# Patient Record
Sex: FEMALE | Race: WHITE | NOT HISPANIC OR LATINO | Employment: UNEMPLOYED | URBAN - METROPOLITAN AREA
[De-identification: names, ages, dates, MRNs, and addresses within clinical notes are randomized per-mention and may not be internally consistent; named-entity substitution may affect disease eponyms.]

---

## 2017-09-14 ENCOUNTER — HOSPITAL ENCOUNTER (OUTPATIENT)
Dept: LAB | Facility: MEDICAL CENTER | Age: 57
End: 2017-09-14
Attending: OBSTETRICS & GYNECOLOGY
Payer: COMMERCIAL

## 2017-09-14 LAB
25(OH)D3 SERPL-MCNC: 34 NG/ML (ref 30–100)
ALBUMIN SERPL BCP-MCNC: 3.8 G/DL (ref 3.2–4.9)
ALBUMIN/GLOB SERPL: 1.3 G/DL
ALP SERPL-CCNC: 48 U/L (ref 30–99)
ALT SERPL-CCNC: 16 U/L (ref 2–50)
ANION GAP SERPL CALC-SCNC: 8 MMOL/L (ref 0–11.9)
AST SERPL-CCNC: 19 U/L (ref 12–45)
BASOPHILS # BLD AUTO: 0.3 % (ref 0–1.8)
BASOPHILS # BLD: 0.03 K/UL (ref 0–0.12)
BILIRUB SERPL-MCNC: 0.5 MG/DL (ref 0.1–1.5)
BUN SERPL-MCNC: 11 MG/DL (ref 8–22)
CALCIUM SERPL-MCNC: 8.9 MG/DL (ref 8.5–10.5)
CHLORIDE SERPL-SCNC: 107 MMOL/L (ref 96–112)
CHOLEST SERPL-MCNC: 166 MG/DL (ref 100–199)
CO2 SERPL-SCNC: 25 MMOL/L (ref 20–33)
CREAT SERPL-MCNC: 0.63 MG/DL (ref 0.5–1.4)
CRP SERPL HS-MCNC: 17.7 MG/L (ref 0–7.5)
EOSINOPHIL # BLD AUTO: 0.12 K/UL (ref 0–0.51)
EOSINOPHIL NFR BLD: 1.4 % (ref 0–6.9)
ERYTHROCYTE [DISTWIDTH] IN BLOOD BY AUTOMATED COUNT: 45 FL (ref 35.9–50)
EST. AVERAGE GLUCOSE BLD GHB EST-MCNC: 117 MG/DL
FERRITIN SERPL-MCNC: 54.5 NG/ML (ref 10–291)
GFR SERPL CREATININE-BSD FRML MDRD: >60 ML/MIN/1.73 M 2
GLOBULIN SER CALC-MCNC: 2.9 G/DL (ref 1.9–3.5)
GLUCOSE SERPL-MCNC: 100 MG/DL (ref 65–99)
HBA1C MFR BLD: 5.7 % (ref 0–5.6)
HCT VFR BLD AUTO: 44.2 % (ref 37–47)
HCYS SERPL-SCNC: 10.35 UMOL/L
HDLC SERPL-MCNC: 41 MG/DL
HGB BLD-MCNC: 14.2 G/DL (ref 12–16)
IMM GRANULOCYTES # BLD AUTO: 0.02 K/UL (ref 0–0.11)
IMM GRANULOCYTES NFR BLD AUTO: 0.2 % (ref 0–0.9)
IRON SATN MFR SERPL: 18 % (ref 15–55)
IRON SERPL-MCNC: 62 UG/DL (ref 40–170)
LDLC SERPL CALC-MCNC: 79 MG/DL
LYMPHOCYTES # BLD AUTO: 3.53 K/UL (ref 1–4.8)
LYMPHOCYTES NFR BLD: 41.1 % (ref 22–41)
MCH RBC QN AUTO: 29.6 PG (ref 27–33)
MCHC RBC AUTO-ENTMCNC: 32.1 G/DL (ref 33.6–35)
MCV RBC AUTO: 92.3 FL (ref 81.4–97.8)
MONOCYTES # BLD AUTO: 0.68 K/UL (ref 0–0.85)
MONOCYTES NFR BLD AUTO: 7.9 % (ref 0–13.4)
NEUTROPHILS # BLD AUTO: 4.21 K/UL (ref 2–7.15)
NEUTROPHILS NFR BLD: 49.1 % (ref 44–72)
NRBC # BLD AUTO: 0 K/UL
NRBC BLD AUTO-RTO: 0 /100 WBC
PLATELET # BLD AUTO: 234 K/UL (ref 164–446)
PMV BLD AUTO: 10.7 FL (ref 9–12.9)
POTASSIUM SERPL-SCNC: 4.1 MMOL/L (ref 3.6–5.5)
PROT SERPL-MCNC: 6.7 G/DL (ref 6–8.2)
RBC # BLD AUTO: 4.79 M/UL (ref 4.2–5.4)
SODIUM SERPL-SCNC: 140 MMOL/L (ref 135–145)
T3FREE SERPL-MCNC: 3.3 PG/ML (ref 2.4–4.2)
T4 FREE SERPL-MCNC: 0.57 NG/DL (ref 0.53–1.43)
TIBC SERPL-MCNC: 339 UG/DL (ref 250–450)
TRIGL SERPL-MCNC: 230 MG/DL (ref 0–149)
TSH SERPL DL<=0.005 MIU/L-ACNC: 1.13 UIU/ML (ref 0.3–3.7)
URATE SERPL-MCNC: 5.1 MG/DL (ref 1.9–8.2)
VIT B12 SERPL-MCNC: 1389 PG/ML (ref 211–911)
WBC # BLD AUTO: 8.6 K/UL (ref 4.8–10.8)

## 2017-09-14 PROCEDURE — 80061 LIPID PANEL: CPT

## 2017-09-14 PROCEDURE — 82728 ASSAY OF FERRITIN: CPT

## 2017-09-14 PROCEDURE — 36415 COLL VENOUS BLD VENIPUNCTURE: CPT

## 2017-09-14 PROCEDURE — 86141 C-REACTIVE PROTEIN HS: CPT

## 2017-09-14 PROCEDURE — 86352 CELL FUNCTION ASSAY W/STIM: CPT

## 2017-09-14 PROCEDURE — 82306 VITAMIN D 25 HYDROXY: CPT

## 2017-09-14 PROCEDURE — 85025 COMPLETE CBC W/AUTO DIFF WBC: CPT

## 2017-09-14 PROCEDURE — 83036 HEMOGLOBIN GLYCOSYLATED A1C: CPT

## 2017-09-14 PROCEDURE — 84443 ASSAY THYROID STIM HORMONE: CPT

## 2017-09-14 PROCEDURE — 84439 ASSAY OF FREE THYROXINE: CPT

## 2017-09-14 PROCEDURE — 83090 ASSAY OF HOMOCYSTEINE: CPT

## 2017-09-14 PROCEDURE — 82607 VITAMIN B-12: CPT

## 2017-09-14 PROCEDURE — 80053 COMPREHEN METABOLIC PANEL: CPT

## 2017-09-14 PROCEDURE — 83520 IMMUNOASSAY QUANT NOS NONAB: CPT | Mod: 91

## 2017-09-14 PROCEDURE — 84481 FREE ASSAY (FT-3): CPT

## 2017-09-14 PROCEDURE — 84550 ASSAY OF BLOOD/URIC ACID: CPT

## 2017-09-14 PROCEDURE — 83540 ASSAY OF IRON: CPT

## 2017-09-14 PROCEDURE — 83550 IRON BINDING TEST: CPT

## 2017-09-17 LAB
MISCELLANEOUS LAB RESULT MISCLAB: NORMAL
MISCELLANEOUS LAB RESULT MISCLAB: NORMAL

## 2017-09-18 LAB
INFLIXIMAB AB SERPL-MCNC: NOT DETECTED NG/ML
Lab: NORMAL

## 2017-09-25 LAB
EER INFLIX ACT RLX AB Q0149: NORMAL
INFLIXIMAB ACT RLX AB Q0148: NOT DETECTED

## 2017-10-04 ENCOUNTER — OFFICE VISIT (OUTPATIENT)
Dept: CARDIOLOGY | Facility: MEDICAL CENTER | Age: 57
End: 2017-10-04
Payer: COMMERCIAL

## 2017-10-04 VITALS
BODY MASS INDEX: 36.02 KG/M2 | DIASTOLIC BLOOD PRESSURE: 84 MMHG | SYSTOLIC BLOOD PRESSURE: 132 MMHG | WEIGHT: 211 LBS | HEART RATE: 75 BPM | HEIGHT: 64 IN | OXYGEN SATURATION: 95 %

## 2017-10-04 DIAGNOSIS — R07.89 CHEST PAIN, ATYPICAL: ICD-10-CM

## 2017-10-04 DIAGNOSIS — I10 ESSENTIAL HYPERTENSION: ICD-10-CM

## 2017-10-04 PROCEDURE — 99213 OFFICE O/P EST LOW 20 MIN: CPT | Performed by: INTERNAL MEDICINE

## 2017-10-04 RX ORDER — METOPROLOL SUCCINATE 50 MG/1
50 TABLET, EXTENDED RELEASE ORAL EVERY MORNING
COMMUNITY
Start: 2017-08-06

## 2017-10-05 ASSESSMENT — ENCOUNTER SYMPTOMS
DIZZINESS: 0
LOSS OF CONSCIOUSNESS: 0
PALPITATIONS: 0

## 2017-10-06 NOTE — PROGRESS NOTES
Subjective:   Senia Valle is a 57 y.o. female who presents today for follow-up evaluation of the history of chest pain and hypertension.    Previously followed by my partner Dr. Leandro Sharma and was last seen on 7/20/2016.    Overall the patient has been doing well from a cardiac standpoint.  Blood pressures been stable.  Still occasionally has some chest pain which undoubtedly has been related to her cervical disc disease for which she's had 2 previous surgeries.  No other cardiac symptoms.    Past Medical History:   Diagnosis Date   • Chest pain, atypical 8/26/2013   • SOB (shortness of breath) 8/26/2013   • Fibromyalgia muscle pain 8/26/2013   • Hypertensive cardiovascular disease 12/30/2011   • Back pain    • Bruxism    • Degeneration of cervical intervertebral disc    • Dizziness    • Hypertension    • Hypothyroidism    • Lumbosacral (joint) (ligament) sprain    • Lumbosacral (joint) (ligament) sprain    • Obesity      Past Surgical History:   Procedure Laterality Date   • CHOLECYSTECTOMY     • OTHER SURGICAL PROCEDURE  cervical vertebral fusion   • OTHER SURGICAL PROCEDURE  hip repair   • OTHER SURGICAL PROCEDURE  lamenectomy decompress   • TONSILLECTOMY       History reviewed. No pertinent family history.  History   Smoking Status   • Former Smoker   • Packs/day: 1.00   • Years: 10.00   • Types: Cigarettes   Smokeless Tobacco   • Never Used     Allergies   Allergen Reactions   • Demerol    • Diazepam    • Flexeril [Cyclobenzaprine Hcl]    • Nsaids    • Percocet [Oxycodone-Acetaminophen] Itching     Outpatient Encounter Prescriptions as of 10/4/2017   Medication Sig Dispense Refill   • metoprolol SR (TOPROL XL) 50 MG TABLET SR 24 HR      • progesterone (PROMETRIUM) 200 MG capsule Take 200 mg by mouth every day.     • Cholecalciferol (VITAMIN D) 400 UNIT/ML LIQD Take  by mouth.     • thyroid (ARMOUR THYROID) 60 MG TABS Take 60 mg by mouth every day.     • MELATONIN by Does not apply route. 4 liquid  "droppers @@ [facility]      • ESTROGENS CONJUGATED Apply  to skin as directed.     • metoprolol (LOPRESSOR) 25 MG Tab Take 1 Tab by mouth 2 times a day. 180 Tab 3   • alprazolam (XANAX) 0.5 MG Tab Take 0.5 mg by mouth at bedtime as needed for Sleep.     • hydrocodone-acetaminophen (NORCO) 5-325 MG Tab per tablet Take 1-2 Tabs by mouth every four hours as needed. As needed     • nitroglycerin (NITROSTAT) 0.4 MG SUBL Place 1 Tab under tongue as needed for Chest Pain. 25 Tab 6     No facility-administered encounter medications on file as of 10/4/2017.      Review of Systems   Cardiovascular: Negative for palpitations.   Neurological: Negative for dizziness and loss of consciousness.        Objective:   /84   Pulse 75   Ht 1.626 m (5' 4\")   Wt 95.7 kg (211 lb)   SpO2 95%   BMI 36.22 kg/m²     Physical Exam   Constitutional: She is oriented to person, place, and time. She appears well-nourished. No distress.   HENT:   Head: Normocephalic.   Eyes: EOM are normal. No scleral icterus.   Neck: Carotid bruit is not present.   Normal jugular venous pressure.  Healed neck scar.   Cardiovascular: Normal rate, regular rhythm, S1 normal, S2 normal and normal heart sounds.  Exam reveals no gallop and no friction rub.    No murmur heard.  Pulses:       Carotid pulses are 2+ on the right side, and 2+ on the left side.       Radial pulses are 2+ on the right side, and 2+ on the left side.   Pulmonary/Chest: Effort normal and breath sounds normal. She has no wheezes. She has no rhonchi. She has no rales.   Musculoskeletal: She exhibits no edema.   Neurological: She is alert and oriented to person, place, and time.   Skin: Skin is warm and dry.   Psychiatric: She has a normal mood and affect. Her behavior is normal.       Assessment:     1. Chest pain, atypical     2. Essential hypertension         Medical Decision Making:  Today's Assessment / Status / Plan:     Chest pain. Nonischemic.    Hypertension. Controlled.    Follow-up one " year, sooner if necessary.

## 2017-10-13 ENCOUNTER — HOSPITAL ENCOUNTER (OUTPATIENT)
Dept: LAB | Facility: MEDICAL CENTER | Age: 57
End: 2017-10-13
Attending: OBSTETRICS & GYNECOLOGY
Payer: COMMERCIAL

## 2017-10-13 LAB — CANCER AG125 SERPL-ACNC: 10.3 U/ML (ref 0–35)

## 2017-10-13 PROCEDURE — 86304 IMMUNOASSAY TUMOR CA 125: CPT

## 2017-10-13 PROCEDURE — 36415 COLL VENOUS BLD VENIPUNCTURE: CPT

## 2018-02-07 ENCOUNTER — APPOINTMENT (OUTPATIENT)
Dept: RADIOLOGY | Facility: MEDICAL CENTER | Age: 58
End: 2018-02-07
Attending: OBSTETRICS & GYNECOLOGY
Payer: COMMERCIAL

## 2018-02-07 DIAGNOSIS — M79.672 LEFT FOOT PAIN: ICD-10-CM

## 2018-02-07 DIAGNOSIS — R51.9 NONINTRACTABLE HEADACHE, UNSPECIFIED CHRONICITY PATTERN, UNSPECIFIED HEADACHE TYPE: ICD-10-CM

## 2018-02-07 DIAGNOSIS — M54.2 NECK PAIN: ICD-10-CM

## 2018-02-07 DIAGNOSIS — R20.0: ICD-10-CM

## 2018-02-07 DIAGNOSIS — M79.671 RIGHT FOOT PAIN: ICD-10-CM

## 2018-02-07 PROCEDURE — A9579 GAD-BASE MR CONTRAST NOS,1ML: HCPCS | Performed by: OBSTETRICS & GYNECOLOGY

## 2018-02-07 PROCEDURE — 72158 MRI LUMBAR SPINE W/O & W/DYE: CPT

## 2018-02-07 PROCEDURE — 72156 MRI NECK SPINE W/O & W/DYE: CPT

## 2018-02-07 PROCEDURE — 700117 HCHG RX CONTRAST REV CODE 255: Performed by: OBSTETRICS & GYNECOLOGY

## 2018-02-07 RX ADMIN — GADODIAMIDE 20 ML: 287 INJECTION INTRAVENOUS at 14:40

## 2018-02-26 ENCOUNTER — APPOINTMENT (OUTPATIENT)
Dept: RADIOLOGY | Facility: MEDICAL CENTER | Age: 58
End: 2018-02-26
Attending: NURSE PRACTITIONER
Payer: COMMERCIAL

## 2018-02-26 DIAGNOSIS — M25.552 LEFT HIP PAIN: ICD-10-CM

## 2018-02-26 PROCEDURE — 73721 MRI JNT OF LWR EXTRE W/O DYE: CPT | Mod: LT

## 2018-03-05 ENCOUNTER — HOSPITAL ENCOUNTER (OUTPATIENT)
Dept: RADIOLOGY | Facility: MEDICAL CENTER | Age: 58
End: 2018-03-05
Attending: OBSTETRICS & GYNECOLOGY
Payer: COMMERCIAL

## 2018-03-05 DIAGNOSIS — N85.2 ENLARGED UTERUS: ICD-10-CM

## 2018-03-05 DIAGNOSIS — R19.00 PELVIC MASS IN FEMALE: ICD-10-CM

## 2018-03-05 PROCEDURE — 76830 TRANSVAGINAL US NON-OB: CPT

## 2018-03-13 ENCOUNTER — HOSPITAL ENCOUNTER (OUTPATIENT)
Dept: RADIOLOGY | Facility: MEDICAL CENTER | Age: 58
End: 2018-03-13
Attending: OBSTETRICS & GYNECOLOGY
Payer: COMMERCIAL

## 2018-03-13 DIAGNOSIS — M79.89 SWELLING OF LIMB: ICD-10-CM

## 2018-03-13 DIAGNOSIS — Z12.31 SCREENING MAMMOGRAM, ENCOUNTER FOR: ICD-10-CM

## 2018-03-13 PROCEDURE — 77067 SCR MAMMO BI INCL CAD: CPT

## 2018-03-13 PROCEDURE — 93971 EXTREMITY STUDY: CPT | Mod: LT

## 2018-04-04 ENCOUNTER — HOSPITAL ENCOUNTER (OUTPATIENT)
Dept: LAB | Facility: MEDICAL CENTER | Age: 58
End: 2018-04-04
Attending: OBSTETRICS & GYNECOLOGY
Payer: COMMERCIAL

## 2018-04-04 LAB
25(OH)D3 SERPL-MCNC: 50 NG/ML (ref 30–100)
ALBUMIN SERPL BCP-MCNC: 3.9 G/DL (ref 3.2–4.9)
ALBUMIN/GLOB SERPL: 1.3 G/DL
ALP SERPL-CCNC: 45 U/L (ref 30–99)
ALT SERPL-CCNC: 18 U/L (ref 2–50)
ANION GAP SERPL CALC-SCNC: 11 MMOL/L (ref 0–11.9)
AST SERPL-CCNC: 19 U/L (ref 12–45)
BILIRUB SERPL-MCNC: 0.6 MG/DL (ref 0.1–1.5)
BUN SERPL-MCNC: 12 MG/DL (ref 8–22)
CALCIUM SERPL-MCNC: 9.1 MG/DL (ref 8.5–10.5)
CHLORIDE SERPL-SCNC: 108 MMOL/L (ref 96–112)
CHOLEST SERPL-MCNC: 204 MG/DL (ref 100–199)
CO2 SERPL-SCNC: 25 MMOL/L (ref 20–33)
CREAT SERPL-MCNC: 0.72 MG/DL (ref 0.5–1.4)
CRP SERPL HS-MCNC: 9.5 MG/L (ref 0–7.5)
GLOBULIN SER CALC-MCNC: 2.9 G/DL (ref 1.9–3.5)
GLUCOSE SERPL-MCNC: 105 MG/DL (ref 65–99)
HDLC SERPL-MCNC: 45 MG/DL
LDLC SERPL CALC-MCNC: 123 MG/DL
POTASSIUM SERPL-SCNC: 4.1 MMOL/L (ref 3.6–5.5)
PROT SERPL-MCNC: 6.8 G/DL (ref 6–8.2)
SODIUM SERPL-SCNC: 144 MMOL/L (ref 135–145)
T3FREE SERPL-MCNC: 2.85 PG/ML (ref 2.4–4.2)
T4 FREE SERPL-MCNC: 0.71 NG/DL (ref 0.53–1.43)
TRIGL SERPL-MCNC: 180 MG/DL (ref 0–149)
TSH SERPL DL<=0.005 MIU/L-ACNC: 0.39 UIU/ML (ref 0.38–5.33)

## 2018-04-04 PROCEDURE — 84481 FREE ASSAY (FT-3): CPT

## 2018-04-04 PROCEDURE — 80061 LIPID PANEL: CPT

## 2018-04-04 PROCEDURE — 82306 VITAMIN D 25 HYDROXY: CPT

## 2018-04-04 PROCEDURE — 84439 ASSAY OF FREE THYROXINE: CPT

## 2018-04-04 PROCEDURE — 83036 HEMOGLOBIN GLYCOSYLATED A1C: CPT

## 2018-04-04 PROCEDURE — 86141 C-REACTIVE PROTEIN HS: CPT

## 2018-04-04 PROCEDURE — 36415 COLL VENOUS BLD VENIPUNCTURE: CPT

## 2018-04-04 PROCEDURE — 80053 COMPREHEN METABOLIC PANEL: CPT

## 2018-04-04 PROCEDURE — 84443 ASSAY THYROID STIM HORMONE: CPT

## 2018-04-05 LAB
EST. AVERAGE GLUCOSE BLD GHB EST-MCNC: 117 MG/DL
HBA1C MFR BLD: 5.7 % (ref 0–5.6)

## 2018-04-06 ENCOUNTER — HOSPITAL ENCOUNTER (OUTPATIENT)
Dept: LAB | Facility: MEDICAL CENTER | Age: 58
End: 2018-04-06
Attending: OBSTETRICS & GYNECOLOGY
Payer: COMMERCIAL

## 2018-04-06 LAB
BASOPHILS # BLD AUTO: 0.5 % (ref 0–1.8)
BASOPHILS # BLD: 0.04 K/UL (ref 0–0.12)
EOSINOPHIL # BLD AUTO: 0.12 K/UL (ref 0–0.51)
EOSINOPHIL NFR BLD: 1.4 % (ref 0–6.9)
ERYTHROCYTE [DISTWIDTH] IN BLOOD BY AUTOMATED COUNT: 45.5 FL (ref 35.9–50)
HCT VFR BLD AUTO: 44.3 % (ref 37–47)
HGB BLD-MCNC: 13.9 G/DL (ref 12–16)
IMM GRANULOCYTES # BLD AUTO: 0.01 K/UL (ref 0–0.11)
IMM GRANULOCYTES NFR BLD AUTO: 0.1 % (ref 0–0.9)
LYMPHOCYTES # BLD AUTO: 3.91 K/UL (ref 1–4.8)
LYMPHOCYTES NFR BLD: 47.1 % (ref 22–41)
MCH RBC QN AUTO: 29.5 PG (ref 27–33)
MCHC RBC AUTO-ENTMCNC: 31.4 G/DL (ref 33.6–35)
MCV RBC AUTO: 94.1 FL (ref 81.4–97.8)
MONOCYTES # BLD AUTO: 0.61 K/UL (ref 0–0.85)
MONOCYTES NFR BLD AUTO: 7.3 % (ref 0–13.4)
NEUTROPHILS # BLD AUTO: 3.62 K/UL (ref 2–7.15)
NEUTROPHILS NFR BLD: 43.6 % (ref 44–72)
NRBC # BLD AUTO: 0 K/UL
NRBC BLD-RTO: 0 /100 WBC
PLATELET # BLD AUTO: 259 K/UL (ref 164–446)
PMV BLD AUTO: 10.9 FL (ref 9–12.9)
RBC # BLD AUTO: 4.71 M/UL (ref 4.2–5.4)
WBC # BLD AUTO: 8.3 K/UL (ref 4.8–10.8)

## 2018-04-06 PROCEDURE — 36415 COLL VENOUS BLD VENIPUNCTURE: CPT

## 2018-04-06 PROCEDURE — 85025 COMPLETE CBC W/AUTO DIFF WBC: CPT

## 2018-05-14 DIAGNOSIS — Z01.810 PRE-OPERATIVE CARDIOVASCULAR EXAMINATION: ICD-10-CM

## 2018-05-14 DIAGNOSIS — Z01.812 PRE-OPERATIVE LABORATORY EXAMINATION: ICD-10-CM

## 2018-05-14 LAB
ANION GAP SERPL CALC-SCNC: 10 MMOL/L (ref 0–11.9)
BASOPHILS # BLD AUTO: 0.3 % (ref 0–1.8)
BASOPHILS # BLD: 0.03 K/UL (ref 0–0.12)
BUN SERPL-MCNC: 10 MG/DL (ref 8–22)
CALCIUM SERPL-MCNC: 9.2 MG/DL (ref 8.5–10.5)
CHLORIDE SERPL-SCNC: 104 MMOL/L (ref 96–112)
CO2 SERPL-SCNC: 25 MMOL/L (ref 20–33)
CREAT SERPL-MCNC: 0.6 MG/DL (ref 0.5–1.4)
EKG IMPRESSION: NORMAL
EOSINOPHIL # BLD AUTO: 0.08 K/UL (ref 0–0.51)
EOSINOPHIL NFR BLD: 0.9 % (ref 0–6.9)
ERYTHROCYTE [DISTWIDTH] IN BLOOD BY AUTOMATED COUNT: 45.4 FL (ref 35.9–50)
GLUCOSE SERPL-MCNC: 92 MG/DL (ref 65–99)
HCT VFR BLD AUTO: 41.3 % (ref 37–47)
HGB BLD-MCNC: 13.6 G/DL (ref 12–16)
HIV 1+2 AB+HIV1 P24 AG SERPL QL IA: NON REACTIVE
IMM GRANULOCYTES # BLD AUTO: 0.03 K/UL (ref 0–0.11)
IMM GRANULOCYTES NFR BLD AUTO: 0.3 % (ref 0–0.9)
LYMPHOCYTES # BLD AUTO: 4.26 K/UL (ref 1–4.8)
LYMPHOCYTES NFR BLD: 45.8 % (ref 22–41)
MCH RBC QN AUTO: 30 PG (ref 27–33)
MCHC RBC AUTO-ENTMCNC: 32.9 G/DL (ref 33.6–35)
MCV RBC AUTO: 91.2 FL (ref 81.4–97.8)
MONOCYTES # BLD AUTO: 0.7 K/UL (ref 0–0.85)
MONOCYTES NFR BLD AUTO: 7.5 % (ref 0–13.4)
NEUTROPHILS # BLD AUTO: 4.2 K/UL (ref 2–7.15)
NEUTROPHILS NFR BLD: 45.2 % (ref 44–72)
NRBC # BLD AUTO: 0 K/UL
NRBC BLD-RTO: 0 /100 WBC
PLATELET # BLD AUTO: 280 K/UL (ref 164–446)
PMV BLD AUTO: 10.3 FL (ref 9–12.9)
POTASSIUM SERPL-SCNC: 4.1 MMOL/L (ref 3.6–5.5)
RBC # BLD AUTO: 4.53 M/UL (ref 4.2–5.4)
SCCMEC + MECA PNL NOSE NAA+PROBE: NEGATIVE
SCCMEC + MECA PNL NOSE NAA+PROBE: NEGATIVE
SODIUM SERPL-SCNC: 139 MMOL/L (ref 135–145)
WBC # BLD AUTO: 9.3 K/UL (ref 4.8–10.8)

## 2018-05-14 PROCEDURE — 87389 HIV-1 AG W/HIV-1&-2 AB AG IA: CPT

## 2018-05-14 PROCEDURE — 36415 COLL VENOUS BLD VENIPUNCTURE: CPT

## 2018-05-14 PROCEDURE — 93005 ELECTROCARDIOGRAM TRACING: CPT

## 2018-05-14 PROCEDURE — 80048 BASIC METABOLIC PNL TOTAL CA: CPT

## 2018-05-14 PROCEDURE — 85025 COMPLETE CBC W/AUTO DIFF WBC: CPT

## 2018-05-14 PROCEDURE — 87641 MR-STAPH DNA AMP PROBE: CPT

## 2018-05-14 PROCEDURE — 93010 ELECTROCARDIOGRAM REPORT: CPT | Performed by: INTERNAL MEDICINE

## 2018-05-14 PROCEDURE — 87640 STAPH A DNA AMP PROBE: CPT

## 2018-05-14 NOTE — DISCHARGE PLANNING
DISCHARGE PLANNING NOTE - TOTAL JOINT     Procedure: Procedure(s):  HIP ARTHROPLASTY ANTERIOR TOTAL  Procedure Date: 5/25/2018  Insurance:  Payor: [facility] / Plan: [facility] [employer] PPO   Equipment currently available at home? front-wheel walker and grabber, long handled brush  Steps into the home? 1  Steps within the home? 2 story, with railing  Toilet height? Standard  Type of shower? walk-in shower with bench  Who will be with you during your recovery? son  Is Outpatient Physical Therapy set up after surgery? No   Did you take the Total Joint Class and where? No, plans on attending at the [facility] prior to surgery     Plan: Patient states she has a FWW that she obtained  then 5 years ago but it is in storage. She will try to retrieve the FWW and will let the nurse know if she needs a new one. Reviewed Equipment Resource list and provided a copy to the patient. She does not feel she will need a raised toilet seat. Provided Home Safety Checklist. Patient hopes to go home the day of surgery.

## 2018-05-24 RX ORDER — CEFAZOLIN SODIUM 2 G/100ML
2 INJECTION, SOLUTION INTRAVENOUS ONCE
Status: DISCONTINUED | OUTPATIENT
Start: 2018-05-25 | End: 2018-05-25 | Stop reason: HOSPADM

## 2018-05-25 ENCOUNTER — APPOINTMENT (OUTPATIENT)
Dept: RADIOLOGY | Facility: MEDICAL CENTER | Age: 58
DRG: 470 | End: 2018-05-25
Attending: ORTHOPAEDIC SURGERY
Payer: COMMERCIAL

## 2018-05-25 ENCOUNTER — HOSPITAL ENCOUNTER (INPATIENT)
Facility: MEDICAL CENTER | Age: 58
LOS: 1 days | DRG: 470 | End: 2018-05-25
Attending: ORTHOPAEDIC SURGERY | Admitting: ORTHOPAEDIC SURGERY
Payer: COMMERCIAL

## 2018-05-25 VITALS
HEART RATE: 77 BPM | RESPIRATION RATE: 16 BRPM | WEIGHT: 207 LBS | BODY MASS INDEX: 35.34 KG/M2 | TEMPERATURE: 97.6 F | HEIGHT: 64 IN | SYSTOLIC BLOOD PRESSURE: 110 MMHG | DIASTOLIC BLOOD PRESSURE: 63 MMHG | OXYGEN SATURATION: 93 %

## 2018-05-25 DIAGNOSIS — Z96.642 STATUS POST LEFT HIP REPLACEMENT: ICD-10-CM

## 2018-05-25 PROBLEM — Z96.649 S/P HIP REPLACEMENT: Status: ACTIVE | Noted: 2018-05-25

## 2018-05-25 PROCEDURE — 160031 HCHG SURGERY MINUTES - 1ST 30 MINS LEVEL 5: Performed by: ORTHOPAEDIC SURGERY

## 2018-05-25 PROCEDURE — G8987 SELF CARE CURRENT STATUS: HCPCS | Mod: CI

## 2018-05-25 PROCEDURE — 502000 HCHG MISC OR IMPLANTS RC 0278: Performed by: ORTHOPAEDIC SURGERY

## 2018-05-25 PROCEDURE — 700105 HCHG RX REV CODE 258: Performed by: ORTHOPAEDIC SURGERY

## 2018-05-25 PROCEDURE — 700101 HCHG RX REV CODE 250: Performed by: ORTHOPAEDIC SURGERY

## 2018-05-25 PROCEDURE — 160009 HCHG ANES TIME/MIN: Performed by: ORTHOPAEDIC SURGERY

## 2018-05-25 PROCEDURE — G8979 MOBILITY GOAL STATUS: HCPCS | Mod: CI

## 2018-05-25 PROCEDURE — 0SRB06Z REPLACEMENT OF LEFT HIP JOINT WITH OXIDIZED ZIRCONIUM ON POLYETHYLENE SYNTHETIC SUBSTITUTE, OPEN APPROACH: ICD-10-PCS | Performed by: ORTHOPAEDIC SURGERY

## 2018-05-25 PROCEDURE — G8978 MOBILITY CURRENT STATUS: HCPCS | Mod: CK

## 2018-05-25 PROCEDURE — A9270 NON-COVERED ITEM OR SERVICE: HCPCS | Performed by: PHYSICIAN ASSISTANT

## 2018-05-25 PROCEDURE — 160002 HCHG RECOVERY MINUTES (STAT): Performed by: ORTHOPAEDIC SURGERY

## 2018-05-25 PROCEDURE — 770001 HCHG ROOM/CARE - MED/SURG/GYN PRIV*

## 2018-05-25 PROCEDURE — 700112 HCHG RX REV CODE 229: Performed by: PHYSICIAN ASSISTANT

## 2018-05-25 PROCEDURE — 700111 HCHG RX REV CODE 636 W/ 250 OVERRIDE (IP)

## 2018-05-25 PROCEDURE — 160035 HCHG PACU - 1ST 60 MINS PHASE I: Performed by: ORTHOPAEDIC SURGERY

## 2018-05-25 PROCEDURE — 700111 HCHG RX REV CODE 636 W/ 250 OVERRIDE (IP): Performed by: PHYSICIAN ASSISTANT

## 2018-05-25 PROCEDURE — 97165 OT EVAL LOW COMPLEX 30 MIN: CPT

## 2018-05-25 PROCEDURE — 160042 HCHG SURGERY MINUTES - EA ADDL 1 MIN LEVEL 5: Performed by: ORTHOPAEDIC SURGERY

## 2018-05-25 PROCEDURE — 700101 HCHG RX REV CODE 250

## 2018-05-25 PROCEDURE — G8988 SELF CARE GOAL STATUS: HCPCS | Mod: CI

## 2018-05-25 PROCEDURE — 700102 HCHG RX REV CODE 250 W/ 637 OVERRIDE(OP): Performed by: PHYSICIAN ASSISTANT

## 2018-05-25 PROCEDURE — A6402 STERILE GAUZE <= 16 SQ IN: HCPCS | Performed by: ORTHOPAEDIC SURGERY

## 2018-05-25 PROCEDURE — 160036 HCHG PACU - EA ADDL 30 MINS PHASE I: Performed by: ORTHOPAEDIC SURGERY

## 2018-05-25 PROCEDURE — 160048 HCHG OR STATISTICAL LEVEL 1-5: Performed by: ORTHOPAEDIC SURGERY

## 2018-05-25 PROCEDURE — 501838 HCHG SUTURE GENERAL: Performed by: ORTHOPAEDIC SURGERY

## 2018-05-25 PROCEDURE — 500002 HCHG ADHESIVE, DERMABOND: Performed by: ORTHOPAEDIC SURGERY

## 2018-05-25 PROCEDURE — 502578 HCHG PACK, TOTAL HIP: Performed by: ORTHOPAEDIC SURGERY

## 2018-05-25 PROCEDURE — 97161 PT EVAL LOW COMPLEX 20 MIN: CPT

## 2018-05-25 DEVICE — IMPLANT REF THREADED HOLE COVER (1EA): Type: IMPLANTABLE DEVICE | Site: HIP | Status: FUNCTIONAL

## 2018-05-25 DEVICE — IMPLANT REF SPHER HEAD SCREW 15MM (1EA): Type: IMPLANTABLE DEVICE | Site: HIP | Status: FUNCTIONAL

## 2018-05-25 DEVICE — IMPLANT R3 3 HOLE ACET SHELL 48MM (1EA): Type: IMPLANTABLE DEVICE | Site: HIP | Status: FUNCTIONAL

## 2018-05-25 DEVICE — IMPLANT REF SPHER HEAD SCREW 25MM (1EA): Type: IMPLANTABLE DEVICE | Site: HIP | Status: FUNCTIONAL

## 2018-05-25 DEVICE — IMPLANT OXINIUM FEM HD 12/14 28MM +0 (1EA): Type: IMPLANTABLE DEVICE | Site: HIP | Status: FUNCTIONAL

## 2018-05-25 DEVICE — IMPLANTABLE DEVICE: Type: IMPLANTABLE DEVICE | Site: HIP | Status: FUNCTIONAL

## 2018-05-25 DEVICE — STEM POLAR CEMENTLESS STANDARD TI/HA 1 (1EA): Type: IMPLANTABLE DEVICE | Site: HIP | Status: FUNCTIONAL

## 2018-05-25 RX ORDER — ENEMA 19; 7 G/133ML; G/133ML
1 ENEMA RECTAL
Status: DISCONTINUED | OUTPATIENT
Start: 2018-05-25 | End: 2018-05-25 | Stop reason: HOSPADM

## 2018-05-25 RX ORDER — ACETAMINOPHEN 500 MG
250 TABLET ORAL
COMMUNITY
End: 2018-08-30

## 2018-05-25 RX ORDER — ONDANSETRON 2 MG/ML
4 INJECTION INTRAMUSCULAR; INTRAVENOUS EVERY 4 HOURS PRN
Status: DISCONTINUED | OUTPATIENT
Start: 2018-05-25 | End: 2018-05-25 | Stop reason: HOSPADM

## 2018-05-25 RX ORDER — DIPHENHYDRAMINE HYDROCHLORIDE 50 MG/ML
25 INJECTION INTRAMUSCULAR; INTRAVENOUS EVERY 6 HOURS PRN
Status: DISCONTINUED | OUTPATIENT
Start: 2018-05-25 | End: 2018-05-25 | Stop reason: HOSPADM

## 2018-05-25 RX ORDER — METOPROLOL SUCCINATE 50 MG/1
50 TABLET, EXTENDED RELEASE ORAL EVERY MORNING
Status: DISCONTINUED | OUTPATIENT
Start: 2018-05-26 | End: 2018-05-25 | Stop reason: HOSPADM

## 2018-05-25 RX ORDER — AMOXICILLIN 250 MG
1 CAPSULE ORAL
Status: DISCONTINUED | OUTPATIENT
Start: 2018-05-25 | End: 2018-05-25 | Stop reason: HOSPADM

## 2018-05-25 RX ORDER — MORPHINE SULFATE 4 MG/ML
4 INJECTION, SOLUTION INTRAMUSCULAR; INTRAVENOUS
Status: DISCONTINUED | OUTPATIENT
Start: 2018-05-25 | End: 2018-05-25 | Stop reason: HOSPADM

## 2018-05-25 RX ORDER — BISACODYL 10 MG
10 SUPPOSITORY, RECTAL RECTAL
Status: DISCONTINUED | OUTPATIENT
Start: 2018-05-25 | End: 2018-05-25 | Stop reason: HOSPADM

## 2018-05-25 RX ORDER — KETOROLAC TROMETHAMINE 30 MG/ML
INJECTION, SOLUTION INTRAMUSCULAR; INTRAVENOUS
Status: DISCONTINUED | OUTPATIENT
Start: 2018-05-25 | End: 2018-05-25 | Stop reason: HOSPADM

## 2018-05-25 RX ORDER — DOCUSATE SODIUM 100 MG/1
100 CAPSULE, LIQUID FILLED ORAL 2 TIMES DAILY
Status: DISCONTINUED | OUTPATIENT
Start: 2018-05-25 | End: 2018-05-25 | Stop reason: HOSPADM

## 2018-05-25 RX ORDER — DEXAMETHASONE SODIUM PHOSPHATE 4 MG/ML
4 INJECTION, SOLUTION INTRA-ARTICULAR; INTRALESIONAL; INTRAMUSCULAR; INTRAVENOUS; SOFT TISSUE
Status: DISCONTINUED | OUTPATIENT
Start: 2018-05-25 | End: 2018-05-25 | Stop reason: HOSPADM

## 2018-05-25 RX ORDER — LIDOCAINE HYDROCHLORIDE 10 MG/ML
INJECTION, SOLUTION EPIDURAL; INFILTRATION; INTRACAUDAL; PERINEURAL
Status: COMPLETED
Start: 2018-05-25 | End: 2018-05-25

## 2018-05-25 RX ORDER — MIDAZOLAM HYDROCHLORIDE 1 MG/ML
0.5 INJECTION INTRAMUSCULAR; INTRAVENOUS
Status: DISCONTINUED | OUTPATIENT
Start: 2018-05-25 | End: 2018-05-25 | Stop reason: HOSPADM

## 2018-05-25 RX ORDER — LIDOCAINE HYDROCHLORIDE 10 MG/ML
0.5 INJECTION, SOLUTION INFILTRATION; PERINEURAL
Status: DISCONTINUED | OUTPATIENT
Start: 2018-05-25 | End: 2018-05-25 | Stop reason: HOSPADM

## 2018-05-25 RX ORDER — POLYETHYLENE GLYCOL 3350 17 G/17G
1 POWDER, FOR SOLUTION ORAL 2 TIMES DAILY PRN
Status: DISCONTINUED | OUTPATIENT
Start: 2018-05-25 | End: 2018-05-25 | Stop reason: HOSPADM

## 2018-05-25 RX ORDER — DIPHENHYDRAMINE HYDROCHLORIDE 50 MG/ML
INJECTION INTRAMUSCULAR; INTRAVENOUS
Status: COMPLETED
Start: 2018-05-25 | End: 2018-05-25

## 2018-05-25 RX ORDER — THYROID 30 MG/1
60 TABLET ORAL EVERY MORNING
Status: DISCONTINUED | OUTPATIENT
Start: 2018-05-26 | End: 2018-05-25 | Stop reason: HOSPADM

## 2018-05-25 RX ORDER — MIDAZOLAM HYDROCHLORIDE 1 MG/ML
INJECTION INTRAMUSCULAR; INTRAVENOUS
Status: COMPLETED
Start: 2018-05-25 | End: 2018-05-25

## 2018-05-25 RX ORDER — SODIUM CHLORIDE, SODIUM LACTATE, POTASSIUM CHLORIDE, CALCIUM CHLORIDE 600; 310; 30; 20 MG/100ML; MG/100ML; MG/100ML; MG/100ML
INJECTION, SOLUTION INTRAVENOUS CONTINUOUS
Status: DISCONTINUED | OUTPATIENT
Start: 2018-05-25 | End: 2018-05-25 | Stop reason: HOSPADM

## 2018-05-25 RX ORDER — SCOLOPAMINE TRANSDERMAL SYSTEM 1 MG/1
1 PATCH, EXTENDED RELEASE TRANSDERMAL
Status: DISCONTINUED | OUTPATIENT
Start: 2018-05-25 | End: 2018-05-25 | Stop reason: HOSPADM

## 2018-05-25 RX ORDER — HYDROCODONE BITARTRATE AND ACETAMINOPHEN 5; 325 MG/1; MG/1
1-2 TABLET ORAL EVERY 6 HOURS PRN
Qty: 50 TAB | Refills: 0 | Status: ON HOLD | OUTPATIENT
Start: 2018-05-25 | End: 2018-05-27

## 2018-05-25 RX ORDER — AMOXICILLIN 250 MG
1 CAPSULE ORAL NIGHTLY
Status: DISCONTINUED | OUTPATIENT
Start: 2018-05-25 | End: 2018-05-25 | Stop reason: HOSPADM

## 2018-05-25 RX ORDER — HALOPERIDOL 5 MG/ML
1 INJECTION INTRAMUSCULAR EVERY 6 HOURS PRN
Status: DISCONTINUED | OUTPATIENT
Start: 2018-05-25 | End: 2018-05-25 | Stop reason: HOSPADM

## 2018-05-25 RX ORDER — HYDROCODONE BITARTRATE AND ACETAMINOPHEN 5; 325 MG/1; MG/1
1-2 TABLET ORAL EVERY 6 HOURS PRN
Status: DISCONTINUED | OUTPATIENT
Start: 2018-05-25 | End: 2018-05-25 | Stop reason: HOSPADM

## 2018-05-25 RX ORDER — BUPIVACAINE HYDROCHLORIDE AND EPINEPHRINE 2.5; 5 MG/ML; UG/ML
INJECTION, SOLUTION INFILTRATION; PERINEURAL
Status: DISCONTINUED | OUTPATIENT
Start: 2018-05-25 | End: 2018-05-25 | Stop reason: HOSPADM

## 2018-05-25 RX ORDER — ALPRAZOLAM 0.25 MG/1
.125-.25 TABLET ORAL NIGHTLY PRN
Status: DISCONTINUED | OUTPATIENT
Start: 2018-05-25 | End: 2018-05-25 | Stop reason: HOSPADM

## 2018-05-25 RX ORDER — HYDROMORPHONE HYDROCHLORIDE 2 MG/1
2 TABLET ORAL
Status: DISCONTINUED | OUTPATIENT
Start: 2018-05-25 | End: 2018-05-25 | Stop reason: HOSPADM

## 2018-05-25 RX ORDER — CEFAZOLIN SODIUM 2 G/100ML
2 INJECTION, SOLUTION INTRAVENOUS EVERY 8 HOURS
Status: DISCONTINUED | OUTPATIENT
Start: 2018-05-25 | End: 2018-05-25 | Stop reason: HOSPADM

## 2018-05-25 RX ADMIN — HYDROMORPHONE HYDROCHLORIDE 0.5 MG: 10 INJECTION, SOLUTION INTRAMUSCULAR; INTRAVENOUS; SUBCUTANEOUS at 11:55

## 2018-05-25 RX ADMIN — HYDROMORPHONE HYDROCHLORIDE 0.2 MG: 10 INJECTION, SOLUTION INTRAMUSCULAR; INTRAVENOUS; SUBCUTANEOUS at 13:30

## 2018-05-25 RX ADMIN — DIPHENHYDRAMINE HYDROCHLORIDE 12.5 MG: 50 INJECTION INTRAMUSCULAR; INTRAVENOUS at 12:00

## 2018-05-25 RX ADMIN — DOCUSATE SODIUM 100 MG: 100 CAPSULE ORAL at 15:00

## 2018-05-25 RX ADMIN — LIDOCAINE HYDROCHLORIDE 0.5 ML: 10 INJECTION, SOLUTION EPIDURAL; INFILTRATION; INTRACAUDAL; PERINEURAL at 09:00

## 2018-05-25 RX ADMIN — HYDROMORPHONE HYDROCHLORIDE 0.2 MG: 10 INJECTION, SOLUTION INTRAMUSCULAR; INTRAVENOUS; SUBCUTANEOUS at 13:10

## 2018-05-25 RX ADMIN — HYDROMORPHONE HYDROCHLORIDE 0.2 MG: 10 INJECTION, SOLUTION INTRAMUSCULAR; INTRAVENOUS; SUBCUTANEOUS at 12:50

## 2018-05-25 RX ADMIN — SODIUM CHLORIDE, SODIUM LACTATE, POTASSIUM CHLORIDE, CALCIUM CHLORIDE: 600; 310; 30; 20 INJECTION, SOLUTION INTRAVENOUS at 09:00

## 2018-05-25 RX ADMIN — HYDROMORPHONE HYDROCHLORIDE 2 MG: 2 TABLET ORAL at 18:00

## 2018-05-25 RX ADMIN — CEFAZOLIN SODIUM 2 G: 2 INJECTION, SOLUTION INTRAVENOUS at 16:50

## 2018-05-25 RX ADMIN — HYDROMORPHONE HYDROCHLORIDE 2 MG: 2 TABLET ORAL at 14:44

## 2018-05-25 RX ADMIN — HYDROMORPHONE HYDROCHLORIDE 0.25 MG: 10 INJECTION, SOLUTION INTRAMUSCULAR; INTRAVENOUS; SUBCUTANEOUS at 12:20

## 2018-05-25 RX ADMIN — HYDROMORPHONE HYDROCHLORIDE 0.25 MG: 10 INJECTION, SOLUTION INTRAMUSCULAR; INTRAVENOUS; SUBCUTANEOUS at 12:10

## 2018-05-25 RX ADMIN — LIDOCAINE HYDROCHLORIDE 0.5 ML: 10 INJECTION, SOLUTION INFILTRATION; PERINEURAL at 09:00

## 2018-05-25 RX ADMIN — HYDROMORPHONE HYDROCHLORIDE 0.2 MG: 10 INJECTION, SOLUTION INTRAMUSCULAR; INTRAVENOUS; SUBCUTANEOUS at 12:30

## 2018-05-25 RX ADMIN — MIDAZOLAM 0.5 MG: 1 INJECTION INTRAMUSCULAR; INTRAVENOUS at 12:03

## 2018-05-25 ASSESSMENT — ACTIVITIES OF DAILY LIVING (ADL): TOILETING: INDEPENDENT

## 2018-05-25 ASSESSMENT — PAIN SCALES - GENERAL
PAINLEVEL_OUTOF10: 10
PAINLEVEL_OUTOF10: 5
PAINLEVEL_OUTOF10: 6
PAINLEVEL_OUTOF10: 0
PAINLEVEL_OUTOF10: 7
PAINLEVEL_OUTOF10: 7
PAINLEVEL_OUTOF10: 9
PAINLEVEL_OUTOF10: 5
PAINLEVEL_OUTOF10: 7
PAINLEVEL_OUTOF10: 10
PAINLEVEL_OUTOF10: 5
PAINLEVEL_OUTOF10: 0
PAINLEVEL_OUTOF10: 4
PAINLEVEL_OUTOF10: 0
PAINLEVEL_OUTOF10: 7
PAINLEVEL_OUTOF10: 3
PAINLEVEL_OUTOF10: 6
PAINLEVEL_OUTOF10: 5
PAINLEVEL_OUTOF10: 5

## 2018-05-25 ASSESSMENT — COGNITIVE AND FUNCTIONAL STATUS - GENERAL
MOVING FROM LYING ON BACK TO SITTING ON SIDE OF FLAT BED: A LITTLE
HELP NEEDED FOR BATHING: A LITTLE
MOBILITY SCORE: 16
SUGGESTED CMS G CODE MODIFIER MOBILITY: CK
WALKING IN HOSPITAL ROOM: A LITTLE
DAILY ACTIVITIY SCORE: 22
DRESSING REGULAR LOWER BODY CLOTHING: A LITTLE
TURNING FROM BACK TO SIDE WHILE IN FLAT BAD: A LOT
CLIMB 3 TO 5 STEPS WITH RAILING: A LITTLE
SUGGESTED CMS G CODE MODIFIER DAILY ACTIVITY: CJ
MOVING TO AND FROM BED TO CHAIR: A LOT
STANDING UP FROM CHAIR USING ARMS: A LITTLE

## 2018-05-25 ASSESSMENT — PATIENT HEALTH QUESTIONNAIRE - PHQ9
1. LITTLE INTEREST OR PLEASURE IN DOING THINGS: NOT AT ALL
2. FEELING DOWN, DEPRESSED, IRRITABLE, OR HOPELESS: NOT AT ALL
SUM OF ALL RESPONSES TO PHQ9 QUESTIONS 1 AND 2: 0

## 2018-05-25 ASSESSMENT — COPD QUESTIONNAIRES
HAVE YOU SMOKED AT LEAST 100 CIGARETTES IN YOUR ENTIRE LIFE: NO/DON'T KNOW
DURING THE PAST 4 WEEKS HOW MUCH DID YOU FEEL SHORT OF BREATH: NONE/LITTLE OF THE TIME
DO YOU EVER COUGH UP ANY MUCUS OR PHLEGM?: NO/ONLY WITH OCCASIONAL COLDS OR INFECTIONS
COPD SCREENING SCORE: 1
IN THE PAST 12 MONTHS DO YOU DO LESS THAN YOU USED TO BECAUSE OF YOUR BREATHING PROBLEMS: DISAGREE/UNSURE

## 2018-05-25 ASSESSMENT — GAIT ASSESSMENTS
DEVIATION: BRADYKINETIC
GAIT LEVEL OF ASSIST: CONTACT GUARD ASSIST
ASSISTIVE DEVICE: FRONT WHEEL WALKER
DISTANCE (FEET): 100

## 2018-05-25 ASSESSMENT — LIFESTYLE VARIABLES: ALCOHOL_USE: NO

## 2018-05-25 NOTE — OR NURSING
Patient  at bedside for comfort. Patient still c/o 6/10.  B/p trending down so meds held until more wakeful.  Cont to monitor closely.  Update MDs as needed when finished with  next case

## 2018-05-25 NOTE — OR NURSING
Pt states she as an intolerance to tylenol, celebrex, and gabapentin.  Pre op multi modal pain rx held.  Notified Dr. Ndiaye and Dr. Vásquez.  No new orders received at this time.

## 2018-05-25 NOTE — OP REPORT
DIAGNOSIS: Osteoarthritis, left hip.    PROCEDURE: left Total hip arthroplasty.    ANESTHESIA: General.    COMPLICATIONS: None.    SURGEON: Valdez Ndiaye MD.    ASSISTANT: Janeth Coreas    INDICATIONS: This is a patient with severe osteoarthritis causing pain,   having failed conservative treatments.    DESCRIPTION OF PROCEDURE: Patient was identified in the preop area, site was   marked, taken back to the operating room and underwent general anesthesia.   left lower extremity was prepped and draped in sterile manner. Preoperative   timeout was held and antibiotics were given. Incision was made coming off the  ASIS. Soft tissue dissected down to fascia. Fascia was split in line with   the tensor. Tensor was retracted laterally. Deep fascia was incised and   vessels were ligated. A capsulotomy was performed and then an osteotomy of   the femoral neck. The acetabulum was then reamed up to a 48 and a 48 R3 cluster   hole cup by [employer] was placed. A liner was placed for a 28 head.   Osteophytes were debrided and then the femur was externally rotated and   extended. This was then broached up to a size 1, and a 1 standard offset polar stem  by Smith and Nephew was placed. Final trialing showed equal leg lengths with  a 0 head, the 0 28 Oxinium head by Smith and Nephew was placed. Wound was   soaked with dilute Betadine solution and was injected with Marcaine. Vicryl   was used for the fascia, Monocryl soft tissue skin and Dermabond for the final  skin layer. Patient was woken up, taken back to PACU, will be weightbear as   tolerated.

## 2018-05-25 NOTE — OR NURSING
Patient very painful states pain level 10/10.  Given dilaudid iv as ordered but patient requesting dilaudid po.  Dr Vásquez called to updated on patient severe pain and anxiety,  Order for midazolam iv and to cont iv dilaudid iv as ordered.  Patient able to lama well.  Denies numbness or tingling in feet bilat.  Strong DP pulses bilat.  Dressing to left ant hip intact, clean and dry.  Ice to hip..  Patient repositioned for comfort

## 2018-05-25 NOTE — CARE PLAN
Problem: Venous Thromboembolism (VTW)/Deep Vein Thrombosis (DVT) Prevention:  Goal: Patient will participate in Venous Thrombosis (VTE)/Deep Vein Thrombosis (DVT)Prevention Measures  Outcome: PROGRESSING AS EXPECTED   05/25/18 1340   Mechanical/VTE Prophylaxis   Mechanical Prophylaxis  SCDs, Sequential Compression Device   SCDs, Sequential Compression Device On       Problem: Pain Management  Goal: Pain level will decrease to patient's comfort goal  Outcome: PROGRESSING AS EXPECTED   05/25/18 1450   OTHER   Nurse Pain Scale 0 - 10  7   Non Verbal Scale  Calm;Unlabored Breathing   Dilaudid 2 mg given for 7/10, tolerated well.     Problem: Mobility  Goal: Risk for activity intolerance will decrease  Outcome: PROGRESSING AS EXPECTED  Pt ambulated with one assist with walker to bathroom and back to bed.  PT working with pt presently. Tolerating well.

## 2018-05-25 NOTE — THERAPY
"Occupational Therapy Evaluation completed.   Functional Status:  SBA supine to sit.  Pt donned underwear seated EOB with min A.  Pt walked hallway using FWW.  Pt reports no difficulty with toilet transfer.  Pt underwent L MIRA and would like to DC home same-day. Pt educated on ADL's/adaptive strategies, AE, and ADL transfers. Pt with high pain and currently having difficulty keeping O2 sats above 90% on RA. Pt encouraged to continue to use IS. Pt will benefit from OT services 1x more as needed if concerns arise and pt remains in house overnight.  Plan of Care: Will benefit from Occupational Therapy 1x more if needed  Discharge Recommendations:  Equipment: No Equipment Needed.     See \"Rehab Therapy-Acute\" Patient Summary Report for complete documentation.    "

## 2018-05-25 NOTE — THERAPY
"Pt is a 58 y/o female presenting to physical therapy [facility]. Pt tolerated ambulation x100 ft with FWW, able to negotiate 2 steps with R railing. Educated on how to negotiate FWW up/down steps to enter home and inside home. Pt provided with MIRA exercise handout and ice for L hip, and incentive spirometry- pt demo'ed appropriate use. Pt will benefit acute PT interventions to optimize post surgical outcomes.  This PT would recommend staying overnight to ensure adequate pain management, and improve bed mobility, ambulation and stair negotaition prior to D/C home.     Physical Therapy Evaluation completed.   Bed Mobility:  Supine to Sit:  (up to EOB upon PT arrival)  Transfers: Sit to Stand: Contact Guard Assist  Gait: Level Of Assist: Contact Guard Assist with Front-Wheel Walker   Plan of Care: Will benefit from Physical Therapy 5 times per week  Discharge Recommendations: Equipment: Front-Wheel Walker. Post-acute therapy: Outpatient PT follow up     See \"Rehab Therapy-Acute\" Patient Summary Report for complete documentation.     "

## 2018-05-26 ENCOUNTER — APPOINTMENT (OUTPATIENT)
Dept: RADIOLOGY | Facility: MEDICAL CENTER | Age: 58
End: 2018-05-26
Attending: EMERGENCY MEDICINE
Payer: COMMERCIAL

## 2018-05-26 ENCOUNTER — HOSPITAL ENCOUNTER (OUTPATIENT)
Facility: MEDICAL CENTER | Age: 58
End: 2018-05-27
Attending: EMERGENCY MEDICINE | Admitting: STUDENT IN AN ORGANIZED HEALTH CARE EDUCATION/TRAINING PROGRAM
Payer: COMMERCIAL

## 2018-05-26 DIAGNOSIS — Z96.642 STATUS POST LEFT HIP REPLACEMENT: ICD-10-CM

## 2018-05-26 LAB
ANION GAP SERPL CALC-SCNC: 5 MMOL/L (ref 0–11.9)
BASOPHILS # BLD AUTO: 0.2 % (ref 0–1.8)
BASOPHILS # BLD: 0.03 K/UL (ref 0–0.12)
BUN SERPL-MCNC: 11 MG/DL (ref 8–22)
CALCIUM SERPL-MCNC: 8.5 MG/DL (ref 8.4–10.2)
CHLORIDE SERPL-SCNC: 102 MMOL/L (ref 96–112)
CO2 SERPL-SCNC: 27 MMOL/L (ref 20–33)
CREAT SERPL-MCNC: 0.72 MG/DL (ref 0.5–1.4)
EOSINOPHIL # BLD AUTO: 0.03 K/UL (ref 0–0.51)
EOSINOPHIL NFR BLD: 0.2 % (ref 0–6.9)
ERYTHROCYTE [DISTWIDTH] IN BLOOD BY AUTOMATED COUNT: 47.2 FL (ref 35.9–50)
GLUCOSE SERPL-MCNC: 132 MG/DL (ref 65–99)
HCT VFR BLD AUTO: 33.3 % (ref 37–47)
HGB BLD-MCNC: 10.7 G/DL (ref 12–16)
IMM GRANULOCYTES # BLD AUTO: 0.04 K/UL (ref 0–0.11)
IMM GRANULOCYTES NFR BLD AUTO: 0.3 % (ref 0–0.9)
LYMPHOCYTES # BLD AUTO: 3.59 K/UL (ref 1–4.8)
LYMPHOCYTES NFR BLD: 26.1 % (ref 22–41)
MCH RBC QN AUTO: 30.1 PG (ref 27–33)
MCHC RBC AUTO-ENTMCNC: 32.1 G/DL (ref 33.6–35)
MCV RBC AUTO: 93.5 FL (ref 81.4–97.8)
MONOCYTES # BLD AUTO: 1.58 K/UL (ref 0–0.85)
MONOCYTES NFR BLD AUTO: 11.5 % (ref 0–13.4)
NEUTROPHILS # BLD AUTO: 8.5 K/UL (ref 2–7.15)
NEUTROPHILS NFR BLD: 61.7 % (ref 44–72)
NRBC # BLD AUTO: 0 K/UL
NRBC BLD-RTO: 0 /100 WBC
PLATELET # BLD AUTO: 207 K/UL (ref 164–446)
PMV BLD AUTO: 9.9 FL (ref 9–12.9)
POTASSIUM SERPL-SCNC: 4.1 MMOL/L (ref 3.6–5.5)
RBC # BLD AUTO: 3.56 M/UL (ref 4.2–5.4)
SODIUM SERPL-SCNC: 134 MMOL/L (ref 135–145)
WBC # BLD AUTO: 13.8 K/UL (ref 4.8–10.8)

## 2018-05-26 PROCEDURE — 73562 X-RAY EXAM OF KNEE 3: CPT | Mod: LT

## 2018-05-26 PROCEDURE — 96375 TX/PRO/DX INJ NEW DRUG ADDON: CPT

## 2018-05-26 PROCEDURE — 80048 BASIC METABOLIC PNL TOTAL CA: CPT

## 2018-05-26 PROCEDURE — 96374 THER/PROPH/DIAG INJ IV PUSH: CPT

## 2018-05-26 PROCEDURE — G0378 HOSPITAL OBSERVATION PER HR: HCPCS

## 2018-05-26 PROCEDURE — 85025 COMPLETE CBC W/AUTO DIFF WBC: CPT

## 2018-05-26 PROCEDURE — 700111 HCHG RX REV CODE 636 W/ 250 OVERRIDE (IP): Performed by: EMERGENCY MEDICINE

## 2018-05-26 PROCEDURE — 73502 X-RAY EXAM HIP UNI 2-3 VIEWS: CPT | Mod: LT

## 2018-05-26 PROCEDURE — 99285 EMERGENCY DEPT VISIT HI MDM: CPT

## 2018-05-26 PROCEDURE — 36415 COLL VENOUS BLD VENIPUNCTURE: CPT

## 2018-05-26 RX ORDER — HYDROMORPHONE HYDROCHLORIDE 2 MG/1
2 TABLET ORAL
Status: DISCONTINUED | OUTPATIENT
Start: 2018-05-26 | End: 2018-05-27 | Stop reason: HOSPADM

## 2018-05-26 RX ORDER — HYDROCODONE BITARTRATE AND ACETAMINOPHEN 5; 325 MG/1; MG/1
1-2 TABLET ORAL EVERY 6 HOURS PRN
Status: DISCONTINUED | OUTPATIENT
Start: 2018-05-26 | End: 2018-05-27 | Stop reason: HOSPADM

## 2018-05-26 RX ORDER — ONDANSETRON 2 MG/ML
4 INJECTION INTRAMUSCULAR; INTRAVENOUS ONCE
Status: COMPLETED | OUTPATIENT
Start: 2018-05-26 | End: 2018-05-26

## 2018-05-26 RX ORDER — ONDANSETRON 2 MG/ML
4 INJECTION INTRAMUSCULAR; INTRAVENOUS EVERY 4 HOURS PRN
Status: DISCONTINUED | OUTPATIENT
Start: 2018-05-26 | End: 2018-05-27 | Stop reason: HOSPADM

## 2018-05-26 RX ORDER — METOPROLOL SUCCINATE 25 MG/1
50 TABLET, EXTENDED RELEASE ORAL EVERY MORNING
Status: DISCONTINUED | OUTPATIENT
Start: 2018-05-27 | End: 2018-05-27 | Stop reason: HOSPADM

## 2018-05-26 RX ORDER — THYROID 60 MG/1
60 TABLET ORAL EVERY MORNING
Status: DISCONTINUED | OUTPATIENT
Start: 2018-05-27 | End: 2018-05-27 | Stop reason: HOSPADM

## 2018-05-26 RX ORDER — ALPRAZOLAM 0.25 MG/1
.125-.25 TABLET ORAL NIGHTLY PRN
Status: DISCONTINUED | OUTPATIENT
Start: 2018-05-26 | End: 2018-05-27 | Stop reason: HOSPADM

## 2018-05-26 RX ORDER — ASCORBIC ACID 500 MG
500 TABLET ORAL DAILY
Status: DISCONTINUED | OUTPATIENT
Start: 2018-05-27 | End: 2018-05-27 | Stop reason: HOSPADM

## 2018-05-26 RX ADMIN — HYDROMORPHONE HYDROCHLORIDE 1 MG: 1 INJECTION, SOLUTION INTRAMUSCULAR; INTRAVENOUS; SUBCUTANEOUS at 21:34

## 2018-05-26 RX ADMIN — ONDANSETRON 4 MG: 2 INJECTION INTRAMUSCULAR; INTRAVENOUS at 21:34

## 2018-05-26 ASSESSMENT — PAIN SCALES - GENERAL
PAINLEVEL_OUTOF10: 9
PAINLEVEL_OUTOF10: 4

## 2018-05-26 NOTE — CARE PLAN
Problem: Discharge Barriers/Planning  Goal: Patient's continuum of care needs will be met  Outcome: MET Date Met: 05/25/18  Discharge orders acknowledged, Pt aware and compliant with new orders, D/C teaching completed, Pt able to verbalize needs and complaint with discharge orders. Medication script given to pt with explanation. IV removed.

## 2018-05-26 NOTE — DISCHARGE INSTRUCTIONS
Total Hip Replacement  Total hip replacement is a surgical procedure to remove damaged bone in your hip joint and replace it with an artificial hip joint (prosthetic hip joint). The purpose of this surgery is to reduce pain and to improve your hip function.  During a total hip replacement, one or both parts of the hip joint are replaced, depending on the type of joint damage you have. The hip is a ball-and-socket type of joint, and it has two main parts. The ball part of the joint (femoral head) is the top of the thigh bone (femur). The socket part of the joint is a large indent in the side of your pelvis (acetabulum) where the femur and pelvis meet.  Tell a health care provider about:  · Any allergies you have.  · All medicines you are taking, including vitamins, herbs, eye drops, creams, and over-the-counter medicines.  · Any problems you or family members have had with anesthetic medicines.  · Any blood disorders you have.  · Any surgeries you have had.  · Any medical conditions you have.  What are the risks?  Generally, total hip replacement is a safe procedure. However, problems can occur, including:  · Infection.  · Dislocation (the ball of the hip-joint prosthesis comes out of contact with the socket).  · Loosening of the piece (stem) that connects the prosthetic femoral head to the femur.  · Fracture of the bone while inserting the prosthesis.  · Formation of blood clots, which can break loose and travel to and injure your lungs (pulmonary embolus).  What happens before the procedure?  · Plan to have someone take you home after the procedure.  · Do not eat or drink anything after midnight on the night before the procedure or as directed by your health care provider.  · Ask your health care provider about:  ¨ Changing or stopping your regular medicines. This is especially important if you are taking diabetes medicines or blood thinners.  ¨ Taking medicines such as aspirin and ibuprofen. These medicines can  thin your blood. Do not take these medicines before your procedure if your health care provider asks you not to.  · Ask your health care provider about how your surgical site will be marked or identified.  · You may be given antibiotic medicines to help prevent infection.  What happens during the procedure?  · To reduce your risk of infection:  ¨ Your health care team will wash or sanitize their hands.  ¨ Your skin will be washed with soap.  · An IV tube will be inserted into one of your veins. You will be given one or more of the following:  ¨ A medicine that makes you drowsy (sedative).  ¨ A medicine that makes you fall asleep (general anesthetic).  ¨ A medicine injected into your spine that numbs your body below the waist (spinal anesthetic).  · An incision will be made in your hip. Your surgeon will take out any damaged cartilage and bone.  · Your surgeon will then:  ¨ Insert a prosthetic socket into the acetabulum of your pelvis. This is usually secured with screws.  ¨ Remove the femoral head and replace it with a prosthetic ball and stem secured into the top of your femur.  ¨ Place the ball into the socket and check the range of motion and stability of your new hip.  ¨ Close the incision and apply a bandage over the surgical site.  What happens after the procedure?  · You will stay in a recovery area until the medicines have worn off.  · Your vital signs, such as your pulse and blood pressure, will be monitored.  · Once you are awake and stable, you will be taken to a hospital room.  · You may be directed to take actions to help prevent blood clots. These may include:  ¨ Walking soon after surgery, with someone assisting you. Moving around after surgery helps to improve blood flow.  ¨ Taking medicines to thin your blood (anticoagulants).  ¨ Wearing compression stockings or using different types of devices.  · You will receive physical therapy until you are doing well and your health care provider feels it is  safe for you to go home.  This information is not intended to replace advice given to you by your health care provider. Make sure you discuss any questions you have with your health care provider.  Document Released: 03/26/2002 Document Revised: 08/21/2017 Document Reviewed: 02/18/2015  [employer] Interactive Patient Education © 2017 [employer]    Discharge Instructions    Discharged to home by car with relative. Discharged via wheelchair, hospital escort: Yes.  Special equipment needed: Walker    Be sure to schedule a follow-up appointment with your primary care doctor or any specialists as instructed.     Discharge Plan:   Diet Plan: Discussed  Activity Level: Discussed  Confirmed Follow up Appointment: Patient to Call and Schedule Appointment  Medication Reconciliation Updated: Yes  Influenza Vaccine Indication: Patient Refuses    I understand that a diet low in cholesterol, fat, and sodium is recommended for good health. Unless I have been given specific instructions below for another diet, I accept this instruction as my diet prescription.   Other diet: Regular Diet as tolerated    Special Instructions: Discharge instructions for the Orthopedic Patient    Follow up with Primary Care Physician within 2 weeks of discharge to home, regarding:  Review of medications and diagnostic testing.  Surveillance for medical complications.  Workup and treatment of osteoporosis, if appropriate.     -Is this a Joint Replacement patient? Yes   Total Joint Hip Replacement Discharge Instructions    Pain  - The goal is to slowly wean off the prescription pain medicine.  - Ice can be used for pain control.  20 minutes at a time is recommended, and never directly against your skin or incision.  - Most patients are off the pain pills by 3 weeks; others may require a low level of pain medications for many months. If your pain continues to be severe, follow up with your physician.  Infection  Deep hip joint infections that require  removal of the prostheses occur in less than 0.1% of patients. Lesser infections in the skin (cellulites) are more common and much more easily treated.  - Keep the incision as clean and dry as possible.  - Always wash your hands before touching your incision.  - Skin infections tend to develop around 7-10 days after surgery, most can be treated with oral antibiotics.  - Dental Care should be delayed for 3 months after surgery, your surgeon recommends taking a dose of antibiotics 1 hour prior to any dental procedure.  After 2 years, most surgeons recommend antibiotics only before an extensive procedure.  Ask your surgeon what he recommends.  - Signs and symptoms of infection can include:  low grade fever, redness, pain, swelling and drainage from your incision.  Notify your surgeon immediately if you develop any of these symptoms.  Post op Disturbances  - Bowel habits - constipation is extremely common and is caused by a combination of anesthesia, lack of mobility and pain medicine.  Use stool softeners or laxatives if necessary. It is important not to ignore this problem, as bowel obstructions can be a serious complication after joint replacement surgery.  - Mood/Energy Level - Many patients experience a lack of energy and endurance for up to 2-3 months after surgery.  Some may also feel down and can even become depressed.  This is likely due to the postoperative anemia, change in activity level, lack of sleep, pain medicine and just the emotional reaction to the surgery itself that is a big disruption in a person’s life.  This usually passes.  If symptoms persist, follow up with your primary physician.  - Returning to work - Your surgeon will give you more specific instructions.  Generally, if you work a sedentary job requiring little standing or walking, most patients may return within 2-6 weeks.  Manual labor jobs involving walking, lifting and standing may take 3-4 months.  Your surgeon’s office can provide a  release to part-time or light duty work early on in your recovery and progress you to full duty as able.  - Driving - You can begin driving an automatic shift car in 4 to 8 weeks, provided you are no longer taking narcotic pain medication. If you have a stick-shift car and your right hip was replaced, do not begin driving until your doctor says you can.   - Avoiding falls -  throw rugs and tack down loose carpeting.  Be aware of floor hazards such as pets, small objects or uneven surfaces.   -  Airport Metal Detectors - The sensitivity of metal detectors varies and it is likely that your prosthesis will cause an alarm. Inform the  that you have an artificial joint.  Diet  - Resume your normal diet as tolerated.  - It is important to achieve a healthy nutritional status by eating a well balanced diet on a regular basis.  - Your physician may recommend that you take iron and vitamin supplements.   - Continue to drink plenty of fluids.  Shower/Bathing  - You may shower as soon as you get home from the hospital unless otherwise instructed.  - Keep your incision out of water.  To keep the incision dry when showering, cover it with a plastic bag or plastic wrap.  - Pat incision dry if it gets wet.  Don’t rub.  - Do not submerge in a bath until staples are out and the incision is completely healed. (Approximately 6-8 weeks after surgery).  Dressing Change:  Procedure (if recommended by your physician)  - Wash hands.  - Open all dressing change materials.  - Remove old dressing and discard.  - Inspect incision for redness, increase in clear drainage, yellow/green drainage, odor and surrounding skin hot to touch.  -  ABD (large gauze) pad by one corner and lay over the incision.  Be careful not to touch the inside of the dressing that will lay over the incision.  - Secure in place as instructed (Ace wrap or tape).    Swelling/Bruising  - Swelling is normal after hip replacement and can involve  the thigh, knee, calf and foot.  - Swelling can last from 3-6 months.  - Elevate your leg higher than your heart while reclining.  The first week you are home you should elevate your leg an equal amount of time, as you are active.    - Anti-inflammatory pills can be taken once you have stopped the blood thinners.  - The swelling is usually worse after you go home since you are upright for longer periods of time.  - Bruising is common and can involve the entire leg including the thigh, calf and even foot.  Bruising often does not appear until after you arrive home and it can be quite dramatic- purple, black, green.  The bruising you can see is not usually concerning and will subside without any treatment.      Blood Clot Prevention  Blood clots in the legs and the less common, but frightening, clots that travel to the lungs are a real focus of our preventative. Most patients are at standard risk for them, but those patients who are at higher risk include people who have had previous clots, a family history of clotting, smoking, diabetes, obesity, advanced age, use of estrogen and a sedentary lifestyle.    - Signs of blood clots in legs - Swelling in thigh, calf or ankle that does not go down with elevation.  Pain, heat and tenderness in calf, back of calf or groin area.  NOTE: blood clots can occur in either leg.  - You have been receiving anticoagulant therapy (blood thinners) in the hospital and you may be instructed to continue at home depending on your risk factors.  - Your risk for developing a clot continues for up to 2-3 months after surgery.  You should avoid prolonged sitting and dehydration during that time (long air trips and car trips).  If you do take a trip during this time, please get up and move around every 1- 1.5 hours.  - If you are prescribed blood thinning medication for home, follow instructions as directed. (Handouts provided if applicable).      Activity    Once you get home, you should stay  active. The key is not to overdo it! While you can expect some good days and some bad days, you should notice a gradual improvement over time you should notice a gradual improvement and a gradual increase in your endurance over the next 6 to 12 months.    - Weight Bearing - If you have undergone cemented or hybrid hip replacement, you can put some weight on the leg immediately using a cane or walker, and you should continue to use some support for 4 to 6 weeks to help the muscles recover.   - Sleeping Positions - Sleep on your back with your legs slightly apart or on your side with a regular pillow between your knees. Be sure to use the pillow for at least 6 weeks, or until your doctor says you can do without it. Sleeping on your stomach should be all right  - Sitting - For at least the first 3 months, sit only in chairs that have arms. Do not sit on low chairs, low stools, or reclining chairs. Do not cross your legs at the knees. The physical therapist will show you how to sit and stand from a chair, keeping your affected leg out in front of you. Get up and move around on a regular basis--at least once every hour.  - Walking - Walk as much as you like once your doctor gives you the go-ahead, but remember that walking is no substitute for your prescribed exercises. Walking with a pair of trekking poles is helpful and adds as much as 40% to the exercise you get when you walk  - Therapy may be needed in some cases, to strengthen your muscles and improve your gait (walking pattern).  This decision will be made at your post-operative appointment.  Follow your therapist recommended post-operative exercises (handout provided by Therapist).  - Swimming is also recommended; you can begin as soon as the sutures have been removed and the wound is healed, approximately 6 to 8 weeks after surgery. Using a pair of training fins may make swimming a more enjoyable and effective exercise.  - Other activities - Lower impact  activities are preferred.  If you have specific questions, consult your Surgeon.    - Sexual activity - Your surgeon can tell you when it should be safe to resume sexual activity.      When to Call the Doctor   Call the physician if:   - Fever over 100.5? F  - Increased pain, drainage, redness, odor or heat around the incision area  - Shaking chills  - Increased knee pain with activity and rest  - Increased pain in calf, tenderness or redness above or below the knee  - Increased swelling of calf, ankle, foot  - Sudden increased shortness of breath, sudden onset of chest pain, localized chest pain with coughing  - Incision opening  Or, if there are any questions or concerns about medications or care.       -Is this patient being discharged with medication to prevent blood clots?  Yes, Xarelto Rivaroxaban oral tablets  What is this medicine?  RIVAROXABAN (ri va ASHISH a ban) is an anticoagulant (blood thinner). It is used to treat blood clots in the lungs or in the veins. It is also used after knee or hip surgeries to prevent blood clots. It is also used to lower the chance of stroke in people with a medical condition called atrial fibrillation.  This medicine may be used for other purposes; ask your health care provider or pharmacist if you have questions.  COMMON BRAND NAME(S): Xarelto, Xarelto Starter Pack  What should I tell my health care provider before I take this medicine?  They need to know if you have any of these conditions:  -bleeding disorders  -bleeding in the brain  -blood in your stools (black or tarry stools) or if you have blood in your vomit  -history of stomach bleeding  -kidney disease  -liver disease  -low blood counts, like low white cell, platelet, or red cell counts  -recent or planned spinal or epidural procedure  -take medicines that treat or prevent blood clots  -an unusual or allergic reaction to rivaroxaban, other medicines, foods, dyes, or preservatives  -pregnant or trying to get  pregnant  -breast-feeding  How should I use this medicine?  Take this medicine by mouth with a glass of water. Follow the directions on the prescription label. Take your medicine at regular intervals. Do not take it more often than directed. Do not stop taking except on your doctor's advice. Stopping this medicine may increase your risk of a blood clot. Be sure to refill your prescription before you run out of medicine.  If you are taking this medicine after hip or knee replacement surgery, take it with or without food. If you are taking this medicine for atrial fibrillation, take it with your evening meal. If you are taking this medicine to treat blood clots, take it with food at the same time each day. If you are unable to swallow your tablet, you may crush the tablet and mix it in applesauce. Then, immediately eat the applesauce. You should eat more food right after you eat the applesauce containing the crushed tablet.  Talk to your pediatrician regarding the use of this medicine in children. Special care may be needed.  Overdosage: If you think you have taken too much of this medicine contact a poison control center or emergency room at once.  NOTE: This medicine is only for you. Do not share this medicine with others.  What if I miss a dose?  If you take your medicine once a day and miss a dose, take the missed dose as soon as you remember. If you take your medicine twice a day and miss a dose, take the missed dose immediately. In this instance, 2 tablets may be taken at the same time. The next day you should take 1 tablet twice a day as directed.  What may interact with this medicine?  Do not take this medicine with any of the following medications:  -defibrotide  This medicine may also interact with the following medications:  -aspirin and aspirin-like medicines  -certain antibiotics like erythromycin, azithromycin, and clarithromycin  -certain medicines for fungal infections like ketoconazole and  itraconazole  -certain medicines for irregular heart beat like amiodarone, quinidine, dronedarone  -certain medicines for seizures like carbamazepine, phenytoin  -certain medicines that treat or prevent blood clots like warfarin, enoxaparin, and dalteparin  -conivaptan  -diltiazem  -felodipine  -indinavir  -lopinavir; ritonavir  -NSAIDS, medicines for pain and inflammation, like ibuprofen or naproxen  -ranolazine  -rifampin  -ritonavir  -SNRIs, medicines for depression, like desvenlafaxine, duloxetine, levomilnacipran, venlafaxine  -SSRIs, medicines for depression, like citalopram, escitalopram, fluoxetine, fluvoxamine, paroxetine, sertraline  -Mentone's wort  -verapamil  This list may not describe all possible interactions. Give your health care provider a list of all the medicines, herbs, non-prescription drugs, or dietary supplements you use. Also tell them if you smoke, drink alcohol, or use illegal drugs. Some items may interact with your medicine.  What should I watch for while using this medicine?  Visit your doctor or health care professional for regular checks on your progress.  Notify your doctor or health care professional and seek emergency treatment if you develop breathing problems; changes in vision; chest pain; severe, sudden headache; pain, swelling, warmth in the leg; trouble speaking; sudden numbness or weakness of the face, arm or leg. These can be signs that your condition has gotten worse.  If you are going to have surgery or other procedure, tell your doctor that you are taking this medicine.  What side effects may I notice from receiving this medicine?  Side effects that you should report to your doctor or health care professional as soon as possible:  -allergic reactions like skin rash, itching or hives, swelling of the face, lips, or tongue  -back pain  -redness, blistering, peeling or loosening of the skin, including inside the mouth  -signs and symptoms of bleeding such as bloody or  black, tarry stools; red or dark-brown urine; spitting up blood or brown material that looks like coffee grounds; red spots on the skin; unusual bruising or bleeding from the eye, gums, or nose  Side effects that usually do not require medical attention (report to your doctor or health care professional if they continue or are bothersome):  -dizziness  -muscle pain  This list may not describe all possible side effects. Call your doctor for medical advice about side effects. You may report side effects to FDA at 1-935-FDA-1411.  Where should I keep my medicine?  Keep out of the reach of children.  Store at room temperature between 15 and 30 degrees C (59 and 86 degrees F). Throw away any unused medicine after the expiration date.  NOTE: This sheet is a summary. It may not cover all possible information. If you have questions about this medicine, talk to your doctor, pharmacist, or health care provider.  © 2018 [employer] (2017-09-06 16:29:33)      · Is patient discharged on Warfarin / Coumadin?   No     Depression / Suicide Risk    As you are discharged from this [facility] facility, it is important to learn how to keep safe from harming yourself.    Recognize the warning signs:  · Abrupt changes in personality, positive or negative- including increase in energy   · Giving away possessions  · Change in eating patterns- significant weight changes-  positive or negative  · Change in sleeping patterns- unable to sleep or sleeping all the time   · Unwillingness or inability to communicate  · Depression  · Unusual sadness, discouragement and loneliness  · Talk of wanting to die  · Neglect of personal appearance   · Rebelliousness- reckless behavior  · Withdrawal from people/activities they love  · Confusion- inability to concentrate     If you or a loved one observes any of these behaviors or has concerns about self-harm, here's what you can do:  · Talk about it- your feelings and reasons for harming  yourself  · Remove any means that you might use to hurt yourself (examples: pills, rope, extension cords, firearm)  · Get professional help from the community (Mental Health, Substance Abuse, psychological counseling)  · Do not be alone:Call your Safe Contact- someone whom you trust who will be there for you.  · Call your local CRISIS HOTLINE 632-6856 or 399-865-6355  · Call your local Children's Mobile Crisis Response Team [place] (182) 189-5861 or www.[employer]  · Call the toll free National Suicide Prevention Hotlines   · National Suicide Prevention Lifeline 824-539-WDNN (5852)  · National Hope Line Network 800-SUICIDE (191-7107)

## 2018-05-27 ENCOUNTER — APPOINTMENT (OUTPATIENT)
Dept: RADIOLOGY | Facility: MEDICAL CENTER | Age: 58
End: 2018-05-27
Attending: HOSPITALIST
Payer: COMMERCIAL

## 2018-05-27 ENCOUNTER — HOME HEALTH ADMISSION (OUTPATIENT)
Dept: HOME HEALTH SERVICES | Facility: HOME HEALTHCARE | Age: 58
End: 2018-05-27
Payer: COMMERCIAL

## 2018-05-27 VITALS
HEART RATE: 82 BPM | SYSTOLIC BLOOD PRESSURE: 109 MMHG | HEIGHT: 64 IN | TEMPERATURE: 98.8 F | DIASTOLIC BLOOD PRESSURE: 75 MMHG | BODY MASS INDEX: 34.15 KG/M2 | WEIGHT: 200 LBS | RESPIRATION RATE: 20 BRPM | OXYGEN SATURATION: 93 %

## 2018-05-27 PROBLEM — D72.829 LEUKOCYTOSIS: Status: ACTIVE | Noted: 2018-05-27

## 2018-05-27 PROBLEM — M25.562 LEFT KNEE PAIN: Status: ACTIVE | Noted: 2018-05-27

## 2018-05-27 LAB
ANION GAP SERPL CALC-SCNC: 3 MMOL/L (ref 0–11.9)
BASOPHILS # BLD AUTO: 0.3 % (ref 0–1.8)
BASOPHILS # BLD: 0.03 K/UL (ref 0–0.12)
BUN SERPL-MCNC: 8 MG/DL (ref 8–22)
CALCIUM SERPL-MCNC: 8.3 MG/DL (ref 8.4–10.2)
CHLORIDE SERPL-SCNC: 104 MMOL/L (ref 96–112)
CO2 SERPL-SCNC: 29 MMOL/L (ref 20–33)
CREAT SERPL-MCNC: 0.63 MG/DL (ref 0.5–1.4)
EOSINOPHIL # BLD AUTO: 0.03 K/UL (ref 0–0.51)
EOSINOPHIL NFR BLD: 0.3 % (ref 0–6.9)
ERYTHROCYTE [DISTWIDTH] IN BLOOD BY AUTOMATED COUNT: 48.3 FL (ref 35.9–50)
GLUCOSE SERPL-MCNC: 116 MG/DL (ref 65–99)
HCT VFR BLD AUTO: 31.1 % (ref 37–47)
HGB BLD-MCNC: 9.9 G/DL (ref 12–16)
IMM GRANULOCYTES # BLD AUTO: 0.04 K/UL (ref 0–0.11)
IMM GRANULOCYTES NFR BLD AUTO: 0.3 % (ref 0–0.9)
LYMPHOCYTES # BLD AUTO: 4.15 K/UL (ref 1–4.8)
LYMPHOCYTES NFR BLD: 34.7 % (ref 22–41)
MCH RBC QN AUTO: 30 PG (ref 27–33)
MCHC RBC AUTO-ENTMCNC: 31.8 G/DL (ref 33.6–35)
MCV RBC AUTO: 94.2 FL (ref 81.4–97.8)
MONOCYTES # BLD AUTO: 1.49 K/UL (ref 0–0.85)
MONOCYTES NFR BLD AUTO: 12.5 % (ref 0–13.4)
NEUTROPHILS # BLD AUTO: 6.22 K/UL (ref 2–7.15)
NEUTROPHILS NFR BLD: 51.9 % (ref 44–72)
NRBC # BLD AUTO: 0 K/UL
NRBC BLD-RTO: 0 /100 WBC
PLATELET # BLD AUTO: 186 K/UL (ref 164–446)
PMV BLD AUTO: 9.8 FL (ref 9–12.9)
POTASSIUM SERPL-SCNC: 4.1 MMOL/L (ref 3.6–5.5)
RBC # BLD AUTO: 3.3 M/UL (ref 4.2–5.4)
SODIUM SERPL-SCNC: 136 MMOL/L (ref 135–145)
WBC # BLD AUTO: 12 K/UL (ref 4.8–10.8)

## 2018-05-27 PROCEDURE — G8978 MOBILITY CURRENT STATUS: HCPCS | Mod: CK

## 2018-05-27 PROCEDURE — 93971 EXTREMITY STUDY: CPT | Mod: LT

## 2018-05-27 PROCEDURE — 80048 BASIC METABOLIC PNL TOTAL CA: CPT

## 2018-05-27 PROCEDURE — 700102 HCHG RX REV CODE 250 W/ 637 OVERRIDE(OP): Performed by: STUDENT IN AN ORGANIZED HEALTH CARE EDUCATION/TRAINING PROGRAM

## 2018-05-27 PROCEDURE — 97162 PT EVAL MOD COMPLEX 30 MIN: CPT

## 2018-05-27 PROCEDURE — 36415 COLL VENOUS BLD VENIPUNCTURE: CPT

## 2018-05-27 PROCEDURE — A9270 NON-COVERED ITEM OR SERVICE: HCPCS | Performed by: STUDENT IN AN ORGANIZED HEALTH CARE EDUCATION/TRAINING PROGRAM

## 2018-05-27 PROCEDURE — 99217 PR OBSERVATION CARE DISCHARGE: CPT | Performed by: HOSPITALIST

## 2018-05-27 PROCEDURE — G8979 MOBILITY GOAL STATUS: HCPCS | Mod: CJ

## 2018-05-27 PROCEDURE — G0378 HOSPITAL OBSERVATION PER HR: HCPCS

## 2018-05-27 PROCEDURE — 85025 COMPLETE CBC W/AUTO DIFF WBC: CPT

## 2018-05-27 RX ORDER — UREA 10 %
1 LOTION (ML) TOPICAL
COMMUNITY

## 2018-05-27 RX ORDER — HYDROCODONE BITARTRATE AND ACETAMINOPHEN 5; 325 MG/1; MG/1
1-2 TABLET ORAL EVERY 6 HOURS PRN
COMMUNITY
End: 2018-08-30

## 2018-05-27 RX ADMIN — LEVOTHYROXINE, LIOTHYRONINE 60 MG: 38; 9 TABLET ORAL at 08:11

## 2018-05-27 RX ADMIN — HYDROMORPHONE HYDROCHLORIDE 2 MG: 2 TABLET ORAL at 06:47

## 2018-05-27 RX ADMIN — HYDROMORPHONE HYDROCHLORIDE 2 MG: 2 TABLET ORAL at 00:36

## 2018-05-27 RX ADMIN — PROGESTERONE 200 MG: 100 CAPSULE ORAL at 01:03

## 2018-05-27 RX ADMIN — HYDROMORPHONE HYDROCHLORIDE 2 MG: 2 TABLET ORAL at 10:09

## 2018-05-27 RX ADMIN — METOPROLOL SUCCINATE 50 MG: 25 TABLET, EXTENDED RELEASE ORAL at 08:11

## 2018-05-27 RX ADMIN — HYDROMORPHONE HYDROCHLORIDE 2 MG: 2 TABLET ORAL at 03:47

## 2018-05-27 ASSESSMENT — LIFESTYLE VARIABLES
TOTAL SCORE: 0
ON A TYPICAL DAY WHEN YOU DRINK ALCOHOL HOW MANY DRINKS DO YOU HAVE: 1
HAVE PEOPLE ANNOYED YOU BY CRITICIZING YOUR DRINKING: NO
TOTAL SCORE: 0
CONSUMPTION TOTAL: NEGATIVE
HAVE YOU EVER FELT YOU SHOULD CUT DOWN ON YOUR DRINKING: NO
ALCOHOL_USE: YES
TOTAL SCORE: 0
EVER HAD A DRINK FIRST THING IN THE MORNING TO STEADY YOUR NERVES TO GET RID OF A HANGOVER: NO
EVER FELT BAD OR GUILTY ABOUT YOUR DRINKING: NO
EVER_SMOKED: YES
AVERAGE NUMBER OF DAYS PER WEEK YOU HAVE A DRINK CONTAINING ALCOHOL: 1
HOW MANY TIMES IN THE PAST YEAR HAVE YOU HAD 5 OR MORE DRINKS IN A DAY: 0

## 2018-05-27 ASSESSMENT — ENCOUNTER SYMPTOMS
FEVER: 0
DEPRESSION: 0
HEARTBURN: 0
NECK PAIN: 0
BACK PAIN: 0
COUGH: 0
WEIGHT LOSS: 0
SPUTUM PRODUCTION: 0
BLURRED VISION: 0
DOUBLE VISION: 0
FALLS: 0
ABDOMINAL PAIN: 0
STRIDOR: 0
SORE THROAT: 0
MYALGIAS: 0
ORTHOPNEA: 0
TINGLING: 0
DIZZINESS: 0
HEMOPTYSIS: 0
HEADACHES: 0
WHEEZING: 0
SHORTNESS OF BREATH: 0
VOMITING: 0
CHILLS: 0
PALPITATIONS: 0
NAUSEA: 0

## 2018-05-27 ASSESSMENT — PATIENT HEALTH QUESTIONNAIRE - PHQ9
1. LITTLE INTEREST OR PLEASURE IN DOING THINGS: NOT AT ALL
SUM OF ALL RESPONSES TO PHQ9 QUESTIONS 1 AND 2: 0
1. LITTLE INTEREST OR PLEASURE IN DOING THINGS: NOT AT ALL
2. FEELING DOWN, DEPRESSED, IRRITABLE, OR HOPELESS: NOT AT ALL
SUM OF ALL RESPONSES TO PHQ9 QUESTIONS 1 AND 2: 0
2. FEELING DOWN, DEPRESSED, IRRITABLE, OR HOPELESS: NOT AT ALL

## 2018-05-27 ASSESSMENT — GAIT ASSESSMENTS
DEVIATION: ANTALGIC
ASSISTIVE DEVICE: FRONT WHEEL WALKER
DISTANCE (FEET): 10
GAIT LEVEL OF ASSIST: STAND BY ASSIST

## 2018-05-27 ASSESSMENT — PAIN SCALES - GENERAL
PAINLEVEL_OUTOF10: 6
PAINLEVEL_OUTOF10: 6
PAINLEVEL_OUTOF10: 8
PAINLEVEL_OUTOF10: 5
PAINLEVEL_OUTOF10: 5

## 2018-05-27 NOTE — DISCHARGE PLANNING
Good afternoon,  This referral is being placed on hold until we can receive confirmation that the patient is okay being seen Wednesday or Thursday at the earliest.  Please contact us directly at [phone] or place a note in the patient's chart.  Thank you!

## 2018-05-27 NOTE — PROGRESS NOTES
Paged Dr. Doherty regarding pt request for wheel chair and d/c orders.   Spoke with Dr. Bessy MD would like pt to stay another night to re-evaulate npain in the morning.

## 2018-05-27 NOTE — PROGRESS NOTES
Paged Dr. Doherty regarding pts request to go home. Pt states she does not want to be here and she is no longer taking pain medicaiton.

## 2018-05-27 NOTE — CARE PLAN
Problem: Pain Management  Goal: Pain level will decrease to patient's comfort goal    Intervention: Follow pain managment plan developed in collaboration with patient and Interdisciplinary Team  Patient progressing as expected and is compliant with current prn pain management regimen.      Problem: Mobility  Goal: Risk for activity intolerance will decrease  Outcome: PROGRESSING AS EXPECTED  Patient chose bedside commode over bedpan and was able to stand, transfer and pivot from bed to commode.

## 2018-05-27 NOTE — FACE TO FACE
Face to Face Note  -  Durable Medical Equipment    Nancy Doherty M.D. - NPI: [id number]  I certify that this patient is under my care and that they had a durable medical equipment(DME)face to face encounter by myself that meets the physician DME face-to-face encounter requirements with this patient on:    Date of encounter:   Patient:                    MRN:                       YOB: 2017  Senia Valle  [id number]  1960     The encounter with the patient was in whole, or in part, for the following medical condition, which is the primary reason for durable medical equipment:  Other - s/p hip replacement    I certify that, based on my findings, the following durable medical equipment is medically necessary:  Wheel Chair.    HOME O2 Saturation Measurements:(Values must be present for Home Oxygen orders)         ,     ,         My Clinical findings support the need for the above equipment due to:  Other - recent surgery    Supporting Symptoms: recent surgery  Weakness

## 2018-05-27 NOTE — H&P
Hospital Medicine History and Physical    Date of Service  5/27/2018    Chief Complaint  Chief Complaint   Patient presents with   • Knee Pain       History of Presenting Illness  57 y.o. female who presented 5/26/2018 with left knee pain that started yesterday. Patient is status post left hip arthroplasty on May 25, 2018. Patient was discharged home with oral pain management. Patient reports that she has had left knee pain that has been intractable since surgery. She reports that she has been unable to walk with her walker because of that. She denies any numbness, tingling, weakness. No fall. She denies any issues with her knees prior to that.   Primary Care Physician  Stephon Rodríguez M.D.    Consultants  Orthopedic surgery    Code Status  Full    Review of Systems  Review of Systems   Constitutional: Negative for chills, fever and weight loss.   HENT: Negative for hearing loss, sore throat and tinnitus.    Eyes: Negative for blurred vision and double vision.   Respiratory: Negative for cough, hemoptysis, sputum production, shortness of breath, wheezing and stridor.    Cardiovascular: Negative for chest pain, palpitations and orthopnea.   Gastrointestinal: Negative for abdominal pain, heartburn, nausea and vomiting.   Genitourinary: Negative for dysuria and urgency.   Musculoskeletal: Positive for joint pain. Negative for back pain, falls, myalgias and neck pain.   Skin: Negative for rash.   Neurological: Negative for dizziness, tingling and headaches.   Psychiatric/Behavioral: Negative for depression.        Past Medical History  Past Medical History:   Diagnosis Date   • Pain 05/2018    all over   • Anxiety disorder 05/2018    not an issue at this time   • SOB (shortness of breath) 8/26/2013    panic attacks   • Chest pain, atypical 8/26/2013   • Fibromyalgia muscle pain 8/26/2013   • Hypertensive cardiovascular disease 12/30/2011   • Back pain    • Bowel habit changes     constipation/diarrhea   • Bruxism    •  Degeneration of cervical intervertebral disc    • Dizziness    • Fibromyalgia    • Hypertension    • Hypothyroidism    • Lumbosacral (joint) (ligament) sprain    • Lumbosacral (joint) (ligament) sprain    • Obesity    • Urinary incontinence     minor       Surgical History  Past Surgical History:   Procedure Laterality Date   • HIP ARTH ANTERIOR TOTAL Left 5/25/2018    Procedure: HIP ARTHROPLASTY ANTERIOR TOTAL;  Surgeon: Vladez Ndiaye M.D.;  Location: SURGERY [facility];  Service: Orthopedics   • CHOLECYSTECTOMY     • GYN SURGERY      ablation    • OTHER NEUROLOGICAL SURG      C spine surgeries X2   • OTHER NEUROLOGICAL SURG      lower back X4   • OTHER ORTHOPEDIC SURGERY      bilateral hip arthroscopy   • OTHER SURGICAL PROCEDURE  cervical vertebral fusion   • OTHER SURGICAL PROCEDURE  hip repair   • OTHER SURGICAL PROCEDURE  lamenectomy decompress   • TONSILLECTOMY         Medications  No current facility-administered medications on file prior to encounter.      Current Outpatient Prescriptions on File Prior to Encounter   Medication Sig Dispense Refill   • acetaminophen (TYLENOL) 500 MG Tab Take 250 mg by mouth 1 time daily as needed.     • HYDROcodone-acetaminophen (NORCO) 5-325 MG Tab per tablet Take 1-2 Tabs by mouth every 6 hours as needed for up to 7 days. 50 Tab 0   • rivaroxaban (XARELTO) 10 MG Tab tablet Take 1 Tab by mouth with dinner. 30 Tab 0   • MAGNESIUM PO Take 2 Tabs by mouth 2 Times a Day.     • B Complex Vitamins (VITAMIN-B COMPLEX PO) Take 1 Tab by mouth every day.     • Ascorbic Acid (VITAMIN C PO) Take 1 Tab by mouth every day.     • metoprolol SR (TOPROL XL) 50 MG TABLET SR 24 HR Take 50 mg by mouth every morning.     • alprazolam (XANAX) 0.5 MG Tab Take 0.125-0.25 mg by mouth at bedtime as needed for Sleep.     • progesterone (PROMETRIUM) 200 MG capsule Take 200 mg by mouth every evening.     • Cholecalciferol (VITAMIN D) 400 UNIT/ML LIQD Take 1 Tab by mouth every day.     • thyroid  "(ARMOUR THYROID) 60 MG TABS Take 60 mg by mouth every morning.     • MELATONIN Take 4 Drops by mouth at bedtime as needed. 4 liquid droppers @ [facility]         Family History  No family history on file.    Social History  Social History   Substance Use Topics   • Smoking status: Former Smoker     Packs/day: 1.00     Years: 10.00     Types: Cigarettes     Quit date: 1988   • Smokeless tobacco: Never Used   • Alcohol use 1.2 oz/week     2 Glasses of wine per week      Comment: 5 per week        Allergies  Allergies   Allergen Reactions   • Banana Anaphylaxis   • Demerol Itching     itching   • Diazepam Anxiety   • Flexeril [Cyclobenzaprine Hcl] Unspecified     \"Makes me crazy\"   • Nsaids Unspecified     GI pain   • Percocet [Oxycodone-Acetaminophen] Itching     Teeth grinding   • Vicodin [Hydrocodone-Acetaminophen] Itching     Low dose OK (if takes more than 1/2 a pill)   • Gabapentin      \"makes me exhausted\"   • Tylenol Itching   • Ultram [Tramadol]      \"stopped it many years ago for a reason but cannot remember why\"        Physical Exam  Laboratory   Hemodynamics  Temp (24hrs), Av.4 °C (99.3 °F), Min:37.2 °C (98.9 °F), Max:37.6 °C (99.6 °F)   Temperature: 37.6 °C (99.6 °F)  Pulse  Av.8  Min: 85  Max: 98    Blood Pressure: 144/58, NIBP: 113/53      Respiratory      Respiration: 18, Pulse Oximetry: 92 %             Physical Exam   Constitutional: She is oriented to person, place, and time. She appears well-developed and well-nourished. No distress.   HENT:   Head: Normocephalic and atraumatic.   Eyes: Pupils are equal, round, and reactive to light. Left eye exhibits no discharge.   Neck: Normal range of motion. No JVD present.   Cardiovascular: Normal rate, regular rhythm and normal heart sounds.    No murmur heard.  Pulmonary/Chest: Effort normal and breath sounds normal. No respiratory distress. She has no wheezes. She has no rales.   Abdominal: Soft. She exhibits no distension. There is no tenderness. " There is no rebound.   Musculoskeletal: She exhibits no edema.   Reduced range of motion of the knee, no swelling, erythema, fluctuance   Neurological: She is alert and oriented to person, place, and time.   Skin: Skin is warm.       Recent Labs      05/26/18 2110   WBC  13.8*   RBC  3.56*   HEMOGLOBIN  10.7*   HEMATOCRIT  33.3*   MCV  93.5   MCH  30.1   MCHC  32.1*   RDW  47.2   PLATELETCT  207   MPV  9.9     Recent Labs      05/26/18 2110   SODIUM  134*   POTASSIUM  4.1   CHLORIDE  102   CO2  27   GLUCOSE  132*   BUN  11   CREATININE  0.72   CALCIUM  8.5     Recent Labs      05/26/18 2110   GLUCOSE  132*                 No results found for: TROPONINI  Urinalysis:  No results found for: SPECGRAVITY, GLUCOSEUR, KETONES, NITRITE, WBCURINE, RBCURINE, BACTERIA, EPITHELCELL     Imaging    3 view left knee x-ray      Assessment/Plan  Narrative       5/26/2018 9:20 PM    HISTORY/REASON FOR EXAM:  Atraumatic Pain/Swelling/Deformity  Unable to ambulate/general knee pain    TECHNIQUE/EXAM DESCRIPTION AND NUMBER OF VIEWS:  3 views of the LEFT knee.    COMPARISON: None    FINDINGS:  No acute fracture or dislocation.    No significant joint osteoarthritis    No knee joint effusion.   Impression         1. No acute osseous abnormality.   Reading Provider Reading Date   Mauricio Sanders M.D. May 26, 2018        I anticipate this patient is appropriate for observation status at this time.    S/P hip replacement- (present on admission)   Assessment & Plan    By Dr. Ndiaye May 25, 2018. Patient is on Zarontin for DVT prophylaxis, we'll continue that. Continue pain control. Continue PT OT.        Leukocytosis- (present on admission)   Assessment & Plan    Likely reactive secondary to pain and or surgery. We'll monitor for now. Patient is otherwise afebrile.        Left knee pain- (present on admission)   Assessment & Plan    Patient presents with left knee pain. She reports no prior history of knee issues. She is status post  left hip arthroplasty yesterday by Dr. Ndiaye. Orthopedic surgery has been consulted and will evaluate the patient. In the meantime will place the patient on oral Dilaudid, his current pain management the patient and she is agreeable at this point. We'll also continue her oral Narco when necessary. PT/OT.        Hypertension- (present on admission)   Assessment & Plan    Continue patient's blood pressure medications.            VTE prophylaxis: Xarelto  Unilateral two-view left hip x-ray   Narrative       5/26/2018 9:20 PM    HISTORY/REASON FOR EXAM:  Atraumatic Pelvic/Hip Pain  hip surgery yesterday/pain/unable to ambulate    TECHNIQUE/EXAM DESCRIPTION AND NUMBER OF VIEWS:  3 views of the LEFT hip.    COMPARISON: None    FINDINGS:  Postsurgical changes from left total hip arthoplasty. No periprosthetic fracture. No lucency.    Small osseous fragment adjacent to the greater trochanter could be heterotopic ossification rather than an avulsion fragment.    Moderate osteoarthritis of the right hip joint.    Evaluation of the sacrum is limited due to overlying bowel content.    Postsurgical change in the lower lumbar spine.     Impression         1. No definite acute osseous abnormality.    2. Small osseous fragment adjacent to the greater trochanter could be heterotopic ossification rather than an avulsion fragment.   Reading Provider Reading Date   Mauricio Sanders M.D. May 26, 2018

## 2018-05-27 NOTE — DISCHARGE INSTRUCTIONS
Discharge Instructions    Discharged to home by car with relative. Discharged via wheelchair, hospital escort: Yes.  Special equipment needed: Wheelchair, Hospital Bed, Commode    Be sure to schedule a follow-up appointment with your primary care doctor or any specialists as instructed.     Discharge Plan:   Influenza Vaccine Indication: Patient Refuses (Cannot eat eggs)    I understand that a diet low in cholesterol, fat, and sodium is recommended for good health. Unless I have been given specific instructions below for another diet, I accept this instruction as my diet prescription.   Other diet: Regular    Special Instructions: Discharge instructions for the Orthopedic Patient    Follow up with Primary Care Physician within 2 weeks of discharge to home, regarding:  Review of medications and diagnostic testing.  Surveillance for medical complications.  Workup and treatment of osteoporosis, if appropriate.     -Is this a Joint Replacement patient? Yes   Total Joint Hip Replacement Discharge Instructions    Pain  - The goal is to slowly wean off the prescription pain medicine.  - Ice can be used for pain control.  20 minutes at a time is recommended, and never directly against your skin or incision.  - Most patients are off the pain pills by 3 weeks; others may require a low level of pain medications for many months. If your pain continues to be severe, follow up with your physician.  Infection  Deep hip joint infections that require removal of the prostheses occur in less than 0.1% of patients. Lesser infections in the skin (cellulites) are more common and much more easily treated.  - Keep the incision as clean and dry as possible.  - Always wash your hands before touching your incision.  - Skin infections tend to develop around 7-10 days after surgery, most can be treated with oral antibiotics.  - Dental Care should be delayed for 3 months after surgery, your surgeon recommends taking a dose of antibiotics 1 hour  prior to any dental procedure.  After 2 years, most surgeons recommend antibiotics only before an extensive procedure.  Ask your surgeon what he recommends.  - Signs and symptoms of infection can include:  low grade fever, redness, pain, swelling and drainage from your incision.  Notify your surgeon immediately if you develop any of these symptoms.  Post op Disturbances  - Bowel habits - constipation is extremely common and is caused by a combination of anesthesia, lack of mobility and pain medicine.  Use stool softeners or laxatives if necessary. It is important not to ignore this problem, as bowel obstructions can be a serious complication after joint replacement surgery.  - Mood/Energy Level - Many patients experience a lack of energy and endurance for up to 2-3 months after surgery.  Some may also feel down and can even become depressed.  This is likely due to the postoperative anemia, change in activity level, lack of sleep, pain medicine and just the emotional reaction to the surgery itself that is a big disruption in a person’s life.  This usually passes.  If symptoms persist, follow up with your primary physician.  - Returning to work - Your surgeon will give you more specific instructions.  Generally, if you work a sedentary job requiring little standing or walking, most patients may return within 2-6 weeks.  Manual labor jobs involving walking, lifting and standing may take 3-4 months.  Your surgeon’s office can provide a release to part-time or light duty work early on in your recovery and progress you to full duty as able.  - Driving - You can begin driving an automatic shift car in 4 to 8 weeks, provided you are no longer taking narcotic pain medication. If you have a stick-shift car and your right hip was replaced, do not begin driving until your doctor says you can.   - Avoiding falls -  throw rugs and tack down loose carpeting.  Be aware of floor hazards such as pets, small objects or uneven  surfaces.   -  Airport Metal Detectors - The sensitivity of metal detectors varies and it is likely that your prosthesis will cause an alarm. Inform the  that you have an artificial joint.  Diet  - Resume your normal diet as tolerated.  - It is important to achieve a healthy nutritional status by eating a well balanced diet on a regular basis.  - Your physician may recommend that you take iron and vitamin supplements.   - Continue to drink plenty of fluids.  Shower/Bathing  - You may shower as soon as you get home from the hospital unless otherwise instructed.  - Keep your incision out of water.  To keep the incision dry when showering, cover it with a plastic bag or plastic wrap.  - Pat incision dry if it gets wet.  Don’t rub.  - Do not submerge in a bath until staples are out and the incision is completely healed. (Approximately 6-8 weeks after surgery).  Dressing Change:  Procedure (if recommended by your physician)  - Wash hands.  - Open all dressing change materials.  - Remove old dressing and discard.  - Inspect incision for redness, increase in clear drainage, yellow/green drainage, odor and surrounding skin hot to touch.  -  ABD (large gauze) pad by one corner and lay over the incision.  Be careful not to touch the inside of the dressing that will lay over the incision.  - Secure in place as instructed (Ace wrap or tape).    Swelling/Bruising  - Swelling is normal after hip replacement and can involve the thigh, knee, calf and foot.  - Swelling can last from 3-6 months.  - Elevate your leg higher than your heart while reclining.  The first week you are home you should elevate your leg an equal amount of time, as you are active.    - Anti-inflammatory pills can be taken once you have stopped the blood thinners.  - The swelling is usually worse after you go home since you are upright for longer periods of time.  - Bruising is common and can involve the entire leg including the thigh,  calf and even foot.  Bruising often does not appear until after you arrive home and it can be quite dramatic- purple, black, green.  The bruising you can see is not usually concerning and will subside without any treatment.      Blood Clot Prevention  Blood clots in the legs and the less common, but frightening, clots that travel to the lungs are a real focus of our preventative. Most patients are at standard risk for them, but those patients who are at higher risk include people who have had previous clots, a family history of clotting, smoking, diabetes, obesity, advanced age, use of estrogen and a sedentary lifestyle.    - Signs of blood clots in legs - Swelling in thigh, calf or ankle that does not go down with elevation.  Pain, heat and tenderness in calf, back of calf or groin area.  NOTE: blood clots can occur in either leg.  - You have been receiving anticoagulant therapy (blood thinners) in the hospital and you may be instructed to continue at home depending on your risk factors.  - Your risk for developing a clot continues for up to 2-3 months after surgery.  You should avoid prolonged sitting and dehydration during that time (long air trips and car trips).  If you do take a trip during this time, please get up and move around every 1- 1.5 hours.  - If you are prescribed blood thinning medication for home, follow instructions as directed. (Handouts provided if applicable).      Activity    Once you get home, you should stay active. The key is not to overdo it! While you can expect some good days and some bad days, you should notice a gradual improvement over time you should notice a gradual improvement and a gradual increase in your endurance over the next 6 to 12 months.    - Weight Bearing - If you have undergone cemented or hybrid hip replacement, you can put some weight on the leg immediately using a cane or walker, and you should continue to use some support for 4 to 6 weeks to help the muscles  recover.   - Sleeping Positions - Sleep on your back with your legs slightly apart or on your side with a regular pillow between your knees. Be sure to use the pillow for at least 6 weeks, or until your doctor says you can do without it. Sleeping on your stomach should be all right  - Sitting - For at least the first 3 months, sit only in chairs that have arms. Do not sit on low chairs, low stools, or reclining chairs. Do not cross your legs at the knees. The physical therapist will show you how to sit and stand from a chair, keeping your affected leg out in front of you. Get up and move around on a regular basis--at least once every hour.  - Walking - Walk as much as you like once your doctor gives you the go-ahead, but remember that walking is no substitute for your prescribed exercises. Walking with a pair of trekking poles is helpful and adds as much as 40% to the exercise you get when you walk  - Therapy may be needed in some cases, to strengthen your muscles and improve your gait (walking pattern).  This decision will be made at your post-operative appointment.  Follow your therapist recommended post-operative exercises (handout provided by Therapist).  - Swimming is also recommended; you can begin as soon as the sutures have been removed and the wound is healed, approximately 6 to 8 weeks after surgery. Using a pair of training fins may make swimming a more enjoyable and effective exercise.  - Other activities - Lower impact activities are preferred.  If you have specific questions, consult your Surgeon.    - Sexual activity - Your surgeon can tell you when it should be safe to resume sexual activity.      When to Call the Doctor   Call the physician if:   - Fever over 100.5? F  - Increased pain, drainage, redness, odor or heat around the incision area  - Shaking chills  - Increased knee pain with activity and rest  - Increased pain in calf, tenderness or redness above or below the knee  - Increased swelling  of calf, ankle, foot  - Sudden increased shortness of breath, sudden onset of chest pain, localized chest pain with coughing  - Incision opening  Or, if there are any questions or concerns about medications or care.       -Is this patient being discharged with medication to prevent blood clots?  Yes, Xarelto Rivaroxaban oral tablets  What is this medicine?  RIVAROXABAN (ri va ASHISH a ban) is an anticoagulant (blood thinner). It is used to treat blood clots in the lungs or in the veins. It is also used after knee or hip surgeries to prevent blood clots. It is also used to lower the chance of stroke in people with a medical condition called atrial fibrillation.  This medicine may be used for other purposes; ask your health care provider or pharmacist if you have questions.  COMMON BRAND NAME(S): Xarelto, Xarelto Starter Pack  What should I tell my health care provider before I take this medicine?  They need to know if you have any of these conditions:  -bleeding disorders  -bleeding in the brain  -blood in your stools (black or tarry stools) or if you have blood in your vomit  -history of stomach bleeding  -kidney disease  -liver disease  -low blood counts, like low white cell, platelet, or red cell counts  -recent or planned spinal or epidural procedure  -take medicines that treat or prevent blood clots  -an unusual or allergic reaction to rivaroxaban, other medicines, foods, dyes, or preservatives  -pregnant or trying to get pregnant  -breast-feeding  How should I use this medicine?  Take this medicine by mouth with a glass of water. Follow the directions on the prescription label. Take your medicine at regular intervals. Do not take it more often than directed. Do not stop taking except on your doctor's advice. Stopping this medicine may increase your risk of a blood clot. Be sure to refill your prescription before you run out of medicine.  If you are taking this medicine after hip or knee replacement surgery, take  it with or without food. If you are taking this medicine for atrial fibrillation, take it with your evening meal. If you are taking this medicine to treat blood clots, take it with food at the same time each day. If you are unable to swallow your tablet, you may crush the tablet and mix it in applesauce. Then, immediately eat the applesauce. You should eat more food right after you eat the applesauce containing the crushed tablet.  Talk to your pediatrician regarding the use of this medicine in children. Special care may be needed.  Overdosage: If you think you have taken too much of this medicine contact a poison control center or emergency room at once.  NOTE: This medicine is only for you. Do not share this medicine with others.  What if I miss a dose?  If you take your medicine once a day and miss a dose, take the missed dose as soon as you remember. If you take your medicine twice a day and miss a dose, take the missed dose immediately. In this instance, 2 tablets may be taken at the same time. The next day you should take 1 tablet twice a day as directed.  What may interact with this medicine?  Do not take this medicine with any of the following medications:  -defibrotide  This medicine may also interact with the following medications:  -aspirin and aspirin-like medicines  -certain antibiotics like erythromycin, azithromycin, and clarithromycin  -certain medicines for fungal infections like ketoconazole and itraconazole  -certain medicines for irregular heart beat like amiodarone, quinidine, dronedarone  -certain medicines for seizures like carbamazepine, phenytoin  -certain medicines that treat or prevent blood clots like warfarin, enoxaparin, and dalteparin  -conivaptan  -diltiazem  -felodipine  -indinavir  -lopinavir; ritonavir  -NSAIDS, medicines for pain and inflammation, like ibuprofen or naproxen  -ranolazine  -rifampin  -ritonavir  -SNRIs, medicines for depression, like desvenlafaxine, duloxetine,  levomilnacipran, venlafaxine  -SSRIs, medicines for depression, like citalopram, escitalopram, fluoxetine, fluvoxamine, paroxetine, sertraline  -Shira's wort  -verapamil  This list may not describe all possible interactions. Give your health care provider a list of all the medicines, herbs, non-prescription drugs, or dietary supplements you use. Also tell them if you smoke, drink alcohol, or use illegal drugs. Some items may interact with your medicine.  What should I watch for while using this medicine?  Visit your doctor or health care professional for regular checks on your progress.  Notify your doctor or health care professional and seek emergency treatment if you develop breathing problems; changes in vision; chest pain; severe, sudden headache; pain, swelling, warmth in the leg; trouble speaking; sudden numbness or weakness of the face, arm or leg. These can be signs that your condition has gotten worse.  If you are going to have surgery or other procedure, tell your doctor that you are taking this medicine.  What side effects may I notice from receiving this medicine?  Side effects that you should report to your doctor or health care professional as soon as possible:  -allergic reactions like skin rash, itching or hives, swelling of the face, lips, or tongue  -back pain  -redness, blistering, peeling or loosening of the skin, including inside the mouth  -signs and symptoms of bleeding such as bloody or black, tarry stools; red or dark-brown urine; spitting up blood or brown material that looks like coffee grounds; red spots on the skin; unusual bruising or bleeding from the eye, gums, or nose  Side effects that usually do not require medical attention (report to your doctor or health care professional if they continue or are bothersome):  -dizziness  -muscle pain  This list may not describe all possible side effects. Call your doctor for medical advice about side effects. You may report side effects to  FDA at 0-295-GKN-3404.  Where should I keep my medicine?  Keep out of the reach of children.  Store at room temperature between 15 and 30 degrees C (59 and 86 degrees F). Throw away any unused medicine after the expiration date.  NOTE: This sheet is a summary. It may not cover all possible information. If you have questions about this medicine, talk to your doctor, pharmacist, or health care provider.  © 2018 [employer]/Gold Standard (2017-09-06 16:29:33)      · Is patient discharged on Warfarin / Coumadin?   No     Depression / Suicide Risk    As you are discharged from this [facility] facility, it is important to learn how to keep safe from harming yourself.    Recognize the warning signs:  · Abrupt changes in personality, positive or negative- including increase in energy   · Giving away possessions  · Change in eating patterns- significant weight changes-  positive or negative  · Change in sleeping patterns- unable to sleep or sleeping all the time   · Unwillingness or inability to communicate  · Depression  · Unusual sadness, discouragement and loneliness  · Talk of wanting to die  · Neglect of personal appearance   · Rebelliousness- reckless behavior  · Withdrawal from people/activities they love  · Confusion- inability to concentrate     If you or a loved one observes any of these behaviors or has concerns about self-harm, here's what you can do:  · Talk about it- your feelings and reasons for harming yourself  · Remove any means that you might use to hurt yourself (examples: pills, rope, extension cords, firearm)  · Get professional help from the community (Mental Health, Substance Abuse, psychological counseling)  · Do not be alone:Call your Safe Contact- someone whom you trust who will be there for you.  · Call your local CRISIS HOTLINE 848-5240 or 458-331-8244  · Call your local Children's Mobile Crisis Response Team [place] (144) 133-6497 or www.[employer]  · Call the toll free National  Suicide Prevention Hotlines   · National Suicide Prevention Lifeline 033-269-NKML (8472)  · National Hope Line Network 800-SUICIDE (886-1427)

## 2018-05-27 NOTE — THERAPY
"Physical Therapy Evaluation completed.   Bed Mobility:  Supine to Sit: Contact Guard Assist  Transfers: Sit to Stand: Stand by Assist  Gait: Level Of Assist: Stand by Assist with Front-Wheel Walker  X 10 feet limited by pain     Plan of Care: Will benefit from Physical Therapy 7 times per week  Discharge Recommendations: Equipment: No Equipment Needed. Post-acute therapy Discharge to home with outpatient or home health for additional skilled therapy services.    See \"Rehab Therapy-Acute\" Patient Summary Report for complete documentation.     "

## 2018-05-27 NOTE — DISCHARGE PLANNING
SABINA received order for DME for this patient. SABINA met with this patient and her spouse bedside.  Patient request for DME was discussed.  Choice form verbally completed for [facility].  SABINA faxed completed choice form to [facility] for referral follow up.

## 2018-05-27 NOTE — ED NOTES
Chief Complaint   Patient presents with   • Knee Pain       Pt arrives via [facility] with C/O 9/10 left knee pain following left hip replacement yesterday.  Assumed patient care. Pt assesement done.  Plan of care reviewed with patient.

## 2018-05-27 NOTE — PROGRESS NOTES
Received report from NOC RN; assumed pt care. Pt A&Ox4, complains of 10/10 pain while standing on LT hip, 5/10 pain while lying down. +CMS. Pt able to dorsi/plantar flex w/out difficulty. No redness or drainage noted. Surgical dressing intact.

## 2018-05-27 NOTE — ASSESSMENT & PLAN NOTE
By Dr. Ndiaye May 25, 2018. Patient is on Zarontin for DVT prophylaxis, we'll continue that. Continue pain control. Continue PT OT.

## 2018-05-27 NOTE — PROGRESS NOTES
2 RN skin assessment done; skin not WDL due to surgical incision left anterior hip; dressing in place, not removed, cdi. Skin otherwise intact.

## 2018-05-27 NOTE — DISCHARGE PLANNING
ATTN: Case Management  RE: Referral for Home Health                We would like to take this opportunity to thank you for submitting a referral for your patient to continue care with [facility]. Our skilled team is dedicated to helping all patients recover and gain independence in the home setting.            As of 05/27/2018, we have accepted the above patient into our service. A [facility] clinician will be out to see the patient within 48 hours to conduct our initial visit. If you have any questions or concerns regarding the patient’s transition to Home Health, please do not hesitate to contact us. We are open for referrals 7 days a week from 8AM to 5PM at [phone].      We look forward to collaborating with you,  [facility] Team

## 2018-05-27 NOTE — ASSESSMENT & PLAN NOTE
Patient presents with left knee pain. She reports no prior history of knee issues. She is status post left hip arthroplasty yesterday by Dr. Ndiaye. Orthopedic surgery has been consulted and will evaluate the patient. In the meantime will place the patient on oral Dilaudid, his current pain management the patient and she is agreeable at this point. We'll also continue her oral Narco when necessary. PT/OT.

## 2018-05-27 NOTE — DISCHARGE SUMMARY
CHIEF COMPLAINT ON ADMISSION  Chief Complaint   Patient presents with   • Knee Pain       CODE STATUS  Full Code    HPI & HOSPITAL COURSE  57 y.o. female who presented 5/26/2018 with left knee pain that started yesterday. Patient is status post left hip arthroplasty on May 25, 2018. Patient was discharged home with oral pain management. Patient reports that she has had left knee pain that has been intractable since surgery. She reports that she has been unable to walk with her walker because of that. She denies any numbness, tingling, weakness. No fall. She denies any issues with her knees prior to that. Patient was admitted for observation status. She was continued on pain medications here. XR of the knee and L hip were obtained which showed no acute abnormalities. LE US was obtained to r/o DVT which was negative. Vitals stayed stable, labs were monitored. PT worked with patient and recommended outpatient PT vs HH, SW was consult and assisted with DME, hospital bed, bedside commode and wheelchair. She was discharged with improvement of her knee pain in stable medical conditions    The patient recovered much more quickly than anticipated on admission.    Therefore, she is discharged in good and stable condition with close outpatient follow-up.    SPECIFIC OUTPATIENT FOLLOW-UP  pcp  Orthopedic surgery    DISCHARGE PROBLEM LIST  Active Problems:    S/P hip replacement POA: Yes    Hypertension POA: Yes    Left knee pain POA: Yes    Leukocytosis POA: Yes  Resolved Problems:    * No resolved hospital problems. *      FOLLOW UP  No future appointments.  No follow-up provider specified.    MEDICATIONS ON DISCHARGE   Senia Valle   Home Medication Instructions FARRAH:[id number]    Printed on:05/27/18 5056   Medication Information                      acetaminophen (TYLENOL) 500 MG Tab  Take 250 mg by mouth 1 time daily as needed for Moderate Pain.             alprazolam (XANAX) 0.5 MG Tab  Take 0.125-0.25 mg by mouth  at bedtime as needed for Anxiety.             Ascorbic Acid (VITAMIN C PO)  Take 1 Tab by mouth every day.             B Complex Vitamins (VITAMIN-B COMPLEX PO)  Take 1 Tab by mouth every day.             Cholecalciferol (VITAMIN D PO)  Take 5 mL by mouth every day.             HYDROcodone-acetaminophen (NORCO) 5-325 MG Tab per tablet  Take 1-2 Tabs by mouth every 6 hours as needed.             MAGNESIUM PO  Take 2 Tabs by mouth 2 Times a Day.             Melatonin 1 MG Tab  Take 1 Tab by mouth every bedtime.             metoprolol SR (TOPROL XL) 50 MG TABLET SR 24 HR  Take 50 mg by mouth every morning.             progesterone (PROMETRIUM) 200 MG capsule  Take 200 mg by mouth every evening.             rivaroxaban (XARELTO) 10 MG Tab tablet  Take 1 Tab by mouth with dinner.             Sennosides (SENOKOT PO)  Take 2 Tabs by mouth as needed (Until she has a bowel movement).             thyroid (ARMOUR THYROID) 60 MG TABS  Take 60 mg by mouth every morning.                 DIET  Orders Placed This Encounter   Procedures   • Diet Order     Standing Status:   Standing     Number of Occurrences:   1     Order Specific Question:   Diet:     Answer:   Regular [1]     Order Specific Question:   Miscellaneous modifications:     Answer:   Gluten Free per PT [10]     Comments:   No eggs       ACTIVITY  As tolerated.  Weight bearing as tolerated      CONSULTATIONS  none    PROCEDURES  none    LABORATORY  Lab Results   Component Value Date/Time    SODIUM 136 05/27/2018 05:20 AM    POTASSIUM 4.1 05/27/2018 05:20 AM    CHLORIDE 104 05/27/2018 05:20 AM    CO2 29 05/27/2018 05:20 AM    GLUCOSE 116 (H) 05/27/2018 05:20 AM    BUN 8 05/27/2018 05:20 AM    CREATININE 0.63 05/27/2018 05:20 AM        Lab Results   Component Value Date/Time    WBC 12.0 (H) 05/27/2018 05:20 AM    HEMOGLOBIN 9.9 (L) 05/27/2018 05:20 AM    HEMATOCRIT 31.1 (L) 05/27/2018 05:20 AM    PLATELETCT 186 05/27/2018 05:20 AM        Total time of the discharge  process exceeds 40 minutes

## 2018-05-27 NOTE — PROGRESS NOTES
Received report from [facility] in ER. Patient to floor at 2345. Patient is alert, oriented and demonstrates clear speech. Patient was able to transfer to bedside commode and voided 950mL. She is expressing pain at 6/10. After assessment and admission profile were completed, patient's pain increasing and asked for pain med. Gave po dilaudid.     0045 Patient resting, dozing in and out of sleep.

## 2018-05-27 NOTE — PROGRESS NOTES
Med rec updated and complete  Allergies reviewed  Pt reports that she just started her XARELTO 10MG yesterday (5/26/2018)  Pt reports no antibiotics in the last 30 days, that she had to take at home.

## 2018-05-27 NOTE — PROGRESS NOTES
"Pt states 5/10 pain. States she does not want to take any pain medication right now, makes her feel \"sleepy.\"   "

## 2018-05-27 NOTE — FACE TO FACE
Face to Face Supporting Documentation - Home Health    The encounter with this patient was in whole or in part the primary reason for home health admission.    Date of encounter:   Patient:                    MRN:                       YOB: 2017  Senia Valle  [id number]  1960     Home health to see patient for:  Skilled Nursing care for assessment, interventions & education, Home health aide, Physical Therapy evaluation and treatment and Occupational therapy evaluation and treatment    Skilled need for:  Exacerbation of Chronic Disease State s/p left hip replacement    Skilled nursing interventions to include:  Comment: PT/OT    Homebound status evidenced by:  Need the aid of supportive devices such as crutches, canes, wheelchairs or walkers, Require the use of special transportation, Needs the assistance of another person in order to leave the home or Have a condition such that leaving his or her home is medically contraindicated. Leaving home requires a considerable and taxing effort. There is a normal inability to leave the home.    Community Physician to provide follow up care: Stephon Rodríguez M.D.     Optional Interventions? No      I certify the face to face encounter for this home health care referral meets the CMS requirements and the encounter/clinical assessment with the patient was, in whole, or in part, for the medical condition(s) listed above, which is the primary reason for home health care. Based on my clinical findings: the service(s) are medically necessary, support the need for home health care, and the homebound criteria are met.  I certify that this patient has had a face to face encounter by myself.  Nancy Doherty M.D. - NPI: [id number]

## 2018-05-27 NOTE — PROGRESS NOTES
Spoke with [facility] regarding equipment, bed is already at pts house, and wheel chair is ready.

## 2018-05-27 NOTE — DISCHARGE PLANNING
SABINA met with this patient regarding HH order received.  Patient stated that she would like home health.  Choice form was completed and faxed to [facility] for referral follow up.  Patient also requested a hospital bed and bedside commode, SABINA to informed Hospitalist.

## 2018-05-27 NOTE — DISCHARGE PLANNING
Spoke To: Luzmaria  Agency/Facility Name: [facility]  Plan or Request: Per DWIGHT Little, wheelchair has been added to patients discharge DME requirements.  [facility] has [facility] to [facility]  Luzmaria has been advised of wheelchair being added  to request.

## 2018-05-27 NOTE — DISCHARGE PLANNING
Received Choice form at 1344  Agency/Facility Name: [facility]  Referral sent to [facility] per Choice form @ 0320 on 05-27-18

## 2018-05-27 NOTE — DISCHARGE PLANNING
Informed hospitalist that Home Health would not be able to see this patient until Wednesday or Thursday.  hospitalist ok with that plan.   SABINA also called [facility] and informed Lorrie.

## 2018-05-27 NOTE — ASSESSMENT & PLAN NOTE
Likely reactive secondary to pain and or surgery. We'll monitor for now. Patient is otherwise afebrile.

## 2018-05-27 NOTE — DISCHARGE PLANNING
Care Transition Team Assessment    Patient lives at home with her spouse and the discharge plan is for this patient to return home when medically able. No current SS needs noted.     Information Source  Orientation : Oriented x 4  Information Given By: Patient  Informant's Name: Senia  Who is responsible for making decisions for patient? : Patient    Elopement Risk  Legal Hold: No  Ambulatory or Self Mobile in Wheelchair: No-Not an Elopement Risk  Disoriented: No  Psychiatric Symptoms: None  History of Wandering: No  Elopement this Admit: No  Vocalizing Wanting to Leave: No  Displays Behaviors, Body Language Wanting to Leave: No-Not at Risk for Elopement  Elopement Risk: Not at Risk for Elopement    Interdisciplinary Discharge Planning  Does Admitting Nurse Feel This Could be a Complex Discharge?: No  Primary Care Physician: Stephon Rodríguez and Salvatore Valle, secondary  Lives with - Patient's Self Care Capacity: Spouse  Patient or legal guardian wants to designate a caregiver (see row info): No  Support Systems: Family Member(s), Friends / Neighbors, Spouse / Significant Other  Housing / Facility: [address]  Do You Take your Prescribed Medications Regularly: Yes  Able to Return to Previous ADL's: Future Time w/Therapy  Mobility Issues: Yes  Prior Services: None  Patient Expects to be Discharged to:: Home  Assistance Needed: Unknown at this Time  Durable Medical Equipment: Walker    Discharge Preparedness  What is your plan after discharge?: Home with help  What are your discharge supports?: Spouse    Finances  Financial Barriers to Discharge: No  Prescription Coverage: Yes    Vision / Hearing Impairment  Vision Impairment : Yes (Reading only)  Hearing Impairment : No    Values / Beliefs / Concerns  Values / Beliefs Concerns : No    Domestic Abuse  Have you ever been the victim of abuse or violence?: No  Physical Abuse or Sexual Abuse: No  Verbal Abuse or Emotional Abuse: No  Possible Abuse Reported to:: Not  Applicable    Psychological Assessment  History of Substance Abuse: None  History of Psychiatric Problems: No    Discharge Risks or Barriers  Discharge risks or barriers?: No    Anticipated Discharge Information  Anticipated discharge disposition: Home

## 2018-05-27 NOTE — ED PROVIDER NOTES
ED Provider Note    CHIEF COMPLAINT  Chief Complaint   Patient presents with   • Knee Pain       HPI  Senia Valle is a 57 y.o. female who presents left hip and knee pain.  Patient had a total hip done yesterday as an outpatient.  She went home.  Now she has tremendous amount of pain to her left knee mostly and some to the hip.  She denies falling.  She says she can control pain at home.  Even with the help of her  who is a physician and her son she is unable to get around at the home and is concerned about falling.  They contacted the orthopedist on-call for her surgeon and they requested she come to the emergency room for further evaluation care and x-rays.    Nothing makes it better.  Moving around makes the pain much worse but seems to be more in the knee than the hip.    States that she talk to the on-call orthopedic doctor that told her to come to the emergency room.      REVIEW OF SYSTEMS  CONSTITUTIONAL:  Denies fever, chills, positive generalized weakness and inability to ambulate due to the pain.  ENT:  Denies sore throat  CARDIOVASCULAR:  Denies chest pain,   RESPIRATORY:  Denies cough or shortness of breath or difficulty breathing  GI:  Denies abdominal pain,  vomiting, or diarrhea  MUSCULOSKELETAL: Positive generalized weakness with left hip and left knee pain.  The knee pain being the worst.  Also some back pain that she states is chronic.    SKIN:  No new rash  ALLERGIC: No itchy rashes.  NEUROLOGIC: Positive mild headache and generalized weakness.  PSYCHIATRIC:  Denies depression      PAST MEDICAL HISTORY  Past Medical History:   Diagnosis Date   • Anxiety disorder 05/2018    not an issue at this time   • Back pain    • Bowel habit changes     constipation/diarrhea   • Bruxism    • Chest pain, atypical 8/26/2013   • Degeneration of cervical intervertebral disc    • Dizziness    • Fibromyalgia    • Fibromyalgia muscle pain 8/26/2013   • Hypertension    • Hypertensive cardiovascular  disease 12/30/2011   • Hypothyroidism    • Lumbosacral (joint) (ligament) sprain    • Lumbosacral (joint) (ligament) sprain    • Obesity    • Pain 05/2018    all over   • SOB (shortness of breath) 8/26/2013    panic attacks   • Urinary incontinence     minor       FAMILY HISTORY  No one else is sick at this time    SOCIAL HISTORY   reports that she quit smoking about 30 years ago. Her smoking use included Cigarettes. She has a 10.00 pack-year smoking history. She has never used smokeless tobacco. She reports that she drinks about 1.2 oz of alcohol per week . She reports that she does not use drugs.    SURGICAL HISTORY  Past Surgical History:   Procedure Laterality Date   • HIP ARTH ANTERIOR TOTAL Left 5/25/2018    Procedure: HIP ARTHROPLASTY ANTERIOR TOTAL;  Surgeon: Valdez Ndiaye M.D.;  Location: SURGERY [facility];  Service: Orthopedics   • CHOLECYSTECTOMY     • GYN SURGERY      ablation    • OTHER NEUROLOGICAL SURG      C spine surgeries X2   • OTHER NEUROLOGICAL SURG      lower back X4   • OTHER ORTHOPEDIC SURGERY      bilateral hip arthroscopy   • OTHER SURGICAL PROCEDURE  cervical vertebral fusion   • OTHER SURGICAL PROCEDURE  hip repair   • OTHER SURGICAL PROCEDURE  lamenectomy decompress   • TONSILLECTOMY         CURRENT MEDICATIONS    Current Facility-Administered Medications:   •  ALPRAZolam (XANAX) tablet 0.125-0.25 mg, 0.125-0.25 mg, Oral, HS PRN, Nancy Cummins M.D.  •  [START ON 5/27/2018] ascorbic acid tablet 500 mg, 500 mg, Oral, DAILY, Nancy Cummins M.D.  •  HYDROcodone-acetaminophen (NORCO) 5-325 MG per tablet 1-2 Tab, 1-2 Tab, Oral, Q6HRS PRN, Nancy Cummins M.D.  •  [START ON 5/27/2018] metoprolol SR (TOPROL XL) tablet 50 mg, 50 mg, Oral, QAM, Nancy Cummins M.D.  •  [START ON 5/27/2018] progesterone (PROMETRIUM) capsule 200 mg, 200 mg, Oral, Q EVENING, Nancy Cummins M.D.  •  [START ON 5/27/2018] rivaroxaban (XARELTO) tablet 10 mg, 10 mg, Oral, PM MEAL, Nancy Cummins  "M.D.  •  [START ON 5/27/2018] thyroid (ARMOUR THYROID) tablet 60 mg, 60 mg, Oral, QAM, Nancy Cummins M.D.  •  HYDROmorphone (DILAUDID) tablet 2 mg, 2 mg, Oral, Q3HRS PRN, Nancy Cummins M.D.  •  ondansetron (ZOFRAN) syringe/vial injection 4 mg, 4 mg, Intravenous, Q4HRS PRN, Nancy Cummins M.D.      ALLERGIES  Allergies   Allergen Reactions   • Banana Anaphylaxis   • Demerol Itching     itching   • Diazepam Anxiety   • Flexeril [Cyclobenzaprine Hcl] Unspecified     \"Makes me crazy\"   • Nsaids Unspecified     GI pain   • Percocet [Oxycodone-Acetaminophen] Itching     Teeth grinding   • Vicodin [Hydrocodone-Acetaminophen] Itching     Low dose OK (if takes more than 1/2 a pill)   • Gabapentin      \"makes me exhausted\"   • Tylenol Itching   • Ultram [Tramadol]      \"stopped it many years ago for a reason but cannot remember why\"       PHYSICAL EXAM  VITAL SIGNS: /63   Pulse 85   Temp 37.2 °C (98.9 °F)   Resp 20   Ht 1.626 m (5' 4\")   Wt 90.7 kg (200 lb)   LMP  (LMP Unknown)   SpO2 93%   BMI 34.33 kg/m²      Constitutional: Patient is awake alert person place and time.  Obvious distress secondary to pain in her leg complain mostly flat but sitting up somewhat.    HENT: Normocephalic, Atraumatic,  Bilateral external ears normal, Oropharynx pink dry with no exudates, Nose patent.   Eyes: Sclera and conjunctiva clear, No discharge.   Neck:  Supple no nuchal rigidity, no thyromegaly or mass. Non-tender  Lymphatic: No supraclavicular  Cardiovascular: Heart is regular rate and rhythm no murmur, rub or thrill.   Thorax & Lungs: Chest is symmetrical, with good breath sounds. No wheezing or crackles. No respiratory distress  Abdomen:  Soft, No tenderness  Skin: Warm, Dry, no petechia, purpura or rash.  Well cleaned dressing left thigh.  Extremities: She is quite a bit of pain to the left knee area even with palpation of the anterior knee.  No posterior knee pain no calf pain no medial thigh pain.  Does have a " lot of pain to her hip as well.    Musculoskeletal: Good range of motion wrists, elbows, shoulders, right hip and  knee, ankles pulses 2+ radially and femorally.  Dressing to the left hip area.  It is clean.  Also a lot of pain to the left knee with palpation anteriorly.  But no obvious redness or heat to the knee.  Neurologic: Alert & oriented to person, place, and time.  Her strength is symmetrical in the upper extremities.  Really did not have her move her left knee or thigh although she is able to wiggle her toes well.  She has got good strength to the right foot and leg.    Psychiatric: Cooperative friendly but obvious in distress secondary to the pain causing some anxiety.      LABS:  Lab Results   Component Value Date    WBC 13.8 (H) 05/26/2018    HEMOGLOBIN 10.7 (L) 05/26/2018    HEMATOCRIT 33.3 (L) 05/26/2018    PLATELETCT 207 05/26/2018    SODIUM 134 (L) 05/26/2018    POTASSIUM 4.1 05/26/2018    CHLORIDE 102 05/26/2018    CO2 27 05/26/2018    BUN 11 05/26/2018    GLUCOSE 132 (H) 05/26/2018    CHOLSTRLTOT 204 (H) 04/04/2018     (H) 04/04/2018    ALTSGPT 18 04/04/2018    ASTSGOT 19 04/04/2018    HBA1C 5.7 (H) 04/04/2018       RADIOLOGY/PROCEDURES  DX-HIP-COMPLETE - UNILATERAL 2+ LEFT   Final Result         1. No definite acute osseous abnormality.      2. Small osseous fragment adjacent to the greater trochanter could be heterotopic ossification rather than an avulsion fragment.      DX-KNEE 3 VIEWS LEFT   Final Result         1. No acute osseous abnormality.            COURSE & MEDICAL DECISION MAKING  Pertinent Labs & Imaging studies reviewed. (See chart for details)  Patient is postop day #1 has much increased pain in her left knee and in her hip.  She is unable to care for self at home even with her son and physician has been trying to help her.  Unclear why she has so much pain at this time however at this time it was felt that she would not be able to go home.  I discussed the case with the  on-call orthopedist and will admit her under the care of the [facility] for further care pain management and physical therapy tomorrow.  Orthopedics will consult on her tomorrow.  I discussed this with the patient and her  and they are comfortable with this.  I initially asked him after the workup was done with a not she felt that she go home and both her and her  felt strongly that she was not able to manage at home at this time due to the amount of pain which I agree with.  Patient has some anemia probably postoperatively and mild leukocytosis which I believe is also postoperative.  No signs of clinical infections at this time.      She will be admitted to the [facility] in guarded condition for continued pain management    FINAL IMPRESSION  1.  Severe left leg pain  2.  Status post left total hip yesterday  .     PLAN  1.  Admission [facility]  2.  Continue pain management    Electronically signed by: Álvaro Bañuelos, 5/26/2018 9:00 PM

## 2018-05-27 NOTE — DISCHARGE PLANNING
Received Choice form at 1246  Agency/Facility Name: [facility]  Referral sent to [facility] per Choice form @ 1249 on 05-27-18

## 2018-05-27 NOTE — FACE TO FACE
Face to Face Note  -  Durable Medical Equipment    Nancy Doherty M.D. - NPI: [id number]  I certify that this patient is under my care and that they had a durable medical equipment(DME)face to face encounter by myself that meets the physician DME face-to-face encounter requirements with this patient on:    Date of encounter:   Patient:                    MRN:                       YOB: 2017  Senia Valle  [id number]  1960     The encounter with the patient was in whole, or in part, for the following medical condition, which is the primary reason for durable medical equipment:  Total Joint Replacement    I certify that, based on my findings, the following durable medical equipment is medically necessary:  Other DME Equipment - hospital bed and bedside cammode.    HOME O2 Saturation Measurements:(Values must be present for Home Oxygen orders)         ,     ,         My Clinical findings support the need for the above equipment due to:  Other - s/p recent hip replacement    Supporting Symptoms: weakness, falls, recent surgery

## 2018-05-28 NOTE — PROGRESS NOTES
Discharging pt home per MD order. Discussed discharge instructions, follow up appointments, prescriptions, and home care. Pt voiding and ambulating without difficulty, pain controlled, tolerating diet. Family at bedside, all questions answered. Pt discharged off unit with hospital escort at 1716.

## 2018-05-29 ENCOUNTER — HOME CARE VISIT (OUTPATIENT)
Dept: HOME HEALTH SERVICES | Facility: HOME HEALTHCARE | Age: 58
End: 2018-05-29

## 2018-07-06 ENCOUNTER — HOSPITAL ENCOUNTER (OUTPATIENT)
Dept: RADIOLOGY | Facility: MEDICAL CENTER | Age: 58
End: 2018-07-06
Attending: OBSTETRICS & GYNECOLOGY
Payer: COMMERCIAL

## 2018-07-06 DIAGNOSIS — M79.632 PAIN OF LEFT FOREARM: ICD-10-CM

## 2018-07-06 DIAGNOSIS — M79.622 PAIN OF LEFT UPPER ARM: ICD-10-CM

## 2018-07-06 DIAGNOSIS — M79.89 SWELLING OF LIMB: ICD-10-CM

## 2018-07-06 PROCEDURE — 73218 MRI UPPER EXTREMITY W/O DYE: CPT | Mod: LT

## 2018-08-30 ENCOUNTER — APPOINTMENT (OUTPATIENT)
Dept: RADIOLOGY | Facility: MEDICAL CENTER | Age: 58
End: 2018-08-30
Payer: COMMERCIAL

## 2018-08-30 ENCOUNTER — HOSPITAL ENCOUNTER (OUTPATIENT)
Facility: MEDICAL CENTER | Age: 58
End: 2018-08-31
Attending: EMERGENCY MEDICINE | Admitting: HOSPITALIST
Payer: COMMERCIAL

## 2018-08-30 ENCOUNTER — APPOINTMENT (OUTPATIENT)
Dept: RADIOLOGY | Facility: MEDICAL CENTER | Age: 58
End: 2018-08-30
Attending: EMERGENCY MEDICINE
Payer: COMMERCIAL

## 2018-08-30 DIAGNOSIS — R07.89 OTHER CHEST PAIN: ICD-10-CM

## 2018-08-30 PROBLEM — N95.1 POST MENOPAUSAL SYNDROME: Status: ACTIVE | Noted: 2018-08-30

## 2018-08-30 PROBLEM — E03.9 ACQUIRED HYPOTHYROIDISM: Status: ACTIVE | Noted: 2018-08-30

## 2018-08-30 PROBLEM — R79.89 ELEVATED D-DIMER: Status: ACTIVE | Noted: 2018-08-30

## 2018-08-30 LAB
ALBUMIN SERPL BCP-MCNC: 4.1 G/DL (ref 3.2–4.9)
ALBUMIN/GLOB SERPL: 1.3 G/DL
ALP SERPL-CCNC: 52 U/L (ref 30–99)
ALT SERPL-CCNC: 25 U/L (ref 2–50)
ANION GAP SERPL CALC-SCNC: 9 MMOL/L (ref 0–11.9)
APTT PPP: 24.2 SEC (ref 24.7–36)
AST SERPL-CCNC: 29 U/L (ref 12–45)
BASOPHILS # BLD AUTO: 0.4 % (ref 0–1.8)
BASOPHILS # BLD: 0.03 K/UL (ref 0–0.12)
BILIRUB SERPL-MCNC: 0.7 MG/DL (ref 0.1–1.5)
BNP SERPL-MCNC: 40 PG/ML (ref 0–100)
BUN SERPL-MCNC: 12 MG/DL (ref 8–22)
CALCIUM SERPL-MCNC: 9.1 MG/DL (ref 8.4–10.2)
CHLORIDE SERPL-SCNC: 106 MMOL/L (ref 96–112)
CO2 SERPL-SCNC: 26 MMOL/L (ref 20–33)
CREAT SERPL-MCNC: 0.73 MG/DL (ref 0.5–1.4)
DEPRECATED D DIMER PPP IA-ACNC: 320 NG/ML(D-DU)
EKG IMPRESSION: NORMAL
EOSINOPHIL # BLD AUTO: 0.07 K/UL (ref 0–0.51)
EOSINOPHIL NFR BLD: 0.9 % (ref 0–6.9)
ERYTHROCYTE [DISTWIDTH] IN BLOOD BY AUTOMATED COUNT: 44.1 FL (ref 35.9–50)
GLOBULIN SER CALC-MCNC: 3.2 G/DL (ref 1.9–3.5)
GLUCOSE SERPL-MCNC: 112 MG/DL (ref 65–99)
HCT VFR BLD AUTO: 41.9 % (ref 37–47)
HGB BLD-MCNC: 13.6 G/DL (ref 12–16)
IMM GRANULOCYTES # BLD AUTO: 0.01 K/UL (ref 0–0.11)
IMM GRANULOCYTES NFR BLD AUTO: 0.1 % (ref 0–0.9)
INR PPP: 1.01 (ref 0.87–1.13)
LIPASE SERPL-CCNC: 24 U/L (ref 7–58)
LYMPHOCYTES # BLD AUTO: 2.88 K/UL (ref 1–4.8)
LYMPHOCYTES NFR BLD: 38.5 % (ref 22–41)
MCH RBC QN AUTO: 28.5 PG (ref 27–33)
MCHC RBC AUTO-ENTMCNC: 32.5 G/DL (ref 33.6–35)
MCV RBC AUTO: 87.7 FL (ref 81.4–97.8)
MONOCYTES # BLD AUTO: 0.6 K/UL (ref 0–0.85)
MONOCYTES NFR BLD AUTO: 8 % (ref 0–13.4)
NEUTROPHILS # BLD AUTO: 3.89 K/UL (ref 2–7.15)
NEUTROPHILS NFR BLD: 52.1 % (ref 44–72)
NRBC # BLD AUTO: 0 K/UL
NRBC BLD-RTO: 0 /100 WBC
PLATELET # BLD AUTO: 242 K/UL (ref 164–446)
PMV BLD AUTO: 9.9 FL (ref 9–12.9)
POTASSIUM SERPL-SCNC: 3.9 MMOL/L (ref 3.6–5.5)
PROT SERPL-MCNC: 7.3 G/DL (ref 6–8.2)
PROTHROMBIN TIME: 13.2 SEC (ref 12–14.6)
RBC # BLD AUTO: 4.78 M/UL (ref 4.2–5.4)
SODIUM SERPL-SCNC: 141 MMOL/L (ref 135–145)
TROPONIN I SERPL-MCNC: <0.02 NG/ML (ref 0–0.04)
TROPONIN I SERPL-MCNC: <0.02 NG/ML (ref 0–0.04)
TSH SERPL DL<=0.005 MIU/L-ACNC: 1.21 UIU/ML (ref 0.38–5.33)
WBC # BLD AUTO: 7.5 K/UL (ref 4.8–10.8)

## 2018-08-30 PROCEDURE — 36415 COLL VENOUS BLD VENIPUNCTURE: CPT

## 2018-08-30 PROCEDURE — G0378 HOSPITAL OBSERVATION PER HR: HCPCS

## 2018-08-30 PROCEDURE — A9270 NON-COVERED ITEM OR SERVICE: HCPCS | Performed by: HOSPITALIST

## 2018-08-30 PROCEDURE — 99219 PR INITIAL OBSERVATION CARE,LEVL II: CPT | Performed by: HOSPITALIST

## 2018-08-30 PROCEDURE — 93005 ELECTROCARDIOGRAM TRACING: CPT | Performed by: EMERGENCY MEDICINE

## 2018-08-30 PROCEDURE — 94660 CPAP INITIATION&MGMT: CPT

## 2018-08-30 PROCEDURE — 700105 HCHG RX REV CODE 258: Performed by: HOSPITALIST

## 2018-08-30 PROCEDURE — 71045 X-RAY EXAM CHEST 1 VIEW: CPT

## 2018-08-30 PROCEDURE — 700102 HCHG RX REV CODE 250 W/ 637 OVERRIDE(OP): Performed by: HOSPITALIST

## 2018-08-30 PROCEDURE — 94760 N-INVAS EAR/PLS OXIMETRY 1: CPT

## 2018-08-30 PROCEDURE — 84484 ASSAY OF TROPONIN QUANT: CPT | Mod: 91

## 2018-08-30 PROCEDURE — 85730 THROMBOPLASTIN TIME PARTIAL: CPT

## 2018-08-30 PROCEDURE — 80053 COMPREHEN METABOLIC PANEL: CPT

## 2018-08-30 PROCEDURE — 85610 PROTHROMBIN TIME: CPT

## 2018-08-30 PROCEDURE — 700117 HCHG RX CONTRAST REV CODE 255: Performed by: EMERGENCY MEDICINE

## 2018-08-30 PROCEDURE — 84443 ASSAY THYROID STIM HORMONE: CPT

## 2018-08-30 PROCEDURE — 85025 COMPLETE CBC W/AUTO DIFF WBC: CPT

## 2018-08-30 PROCEDURE — 99285 EMERGENCY DEPT VISIT HI MDM: CPT

## 2018-08-30 PROCEDURE — 83880 ASSAY OF NATRIURETIC PEPTIDE: CPT

## 2018-08-30 PROCEDURE — 93005 ELECTROCARDIOGRAM TRACING: CPT

## 2018-08-30 PROCEDURE — 85379 FIBRIN DEGRADATION QUANT: CPT

## 2018-08-30 PROCEDURE — 71275 CT ANGIOGRAPHY CHEST: CPT

## 2018-08-30 PROCEDURE — 83690 ASSAY OF LIPASE: CPT

## 2018-08-30 RX ORDER — ONDANSETRON 2 MG/ML
4 INJECTION INTRAMUSCULAR; INTRAVENOUS EVERY 4 HOURS PRN
Status: DISCONTINUED | OUTPATIENT
Start: 2018-08-30 | End: 2018-08-31 | Stop reason: HOSPADM

## 2018-08-30 RX ORDER — POLYETHYLENE GLYCOL 3350 17 G/17G
1 POWDER, FOR SOLUTION ORAL
Status: DISCONTINUED | OUTPATIENT
Start: 2018-08-30 | End: 2018-08-31 | Stop reason: HOSPADM

## 2018-08-30 RX ORDER — SODIUM CHLORIDE 9 MG/ML
INJECTION, SOLUTION INTRAVENOUS CONTINUOUS
Status: DISCONTINUED | OUTPATIENT
Start: 2018-08-30 | End: 2018-08-31 | Stop reason: HOSPADM

## 2018-08-30 RX ORDER — ALPRAZOLAM 0.25 MG/1
.125-.25 TABLET ORAL NIGHTLY PRN
Status: DISCONTINUED | OUTPATIENT
Start: 2018-08-30 | End: 2018-08-31 | Stop reason: HOSPADM

## 2018-08-30 RX ORDER — PROMETHAZINE HYDROCHLORIDE 25 MG/1
12.5-25 TABLET ORAL EVERY 4 HOURS PRN
Status: DISCONTINUED | OUTPATIENT
Start: 2018-08-30 | End: 2018-08-31 | Stop reason: HOSPADM

## 2018-08-30 RX ORDER — ASCORBIC ACID 500 MG
500 TABLET ORAL DAILY
Status: DISCONTINUED | OUTPATIENT
Start: 2018-08-31 | End: 2018-08-31 | Stop reason: HOSPADM

## 2018-08-30 RX ORDER — ONDANSETRON 4 MG/1
4 TABLET, ORALLY DISINTEGRATING ORAL EVERY 4 HOURS PRN
Status: DISCONTINUED | OUTPATIENT
Start: 2018-08-30 | End: 2018-08-31 | Stop reason: HOSPADM

## 2018-08-30 RX ORDER — SENNOSIDES A AND B 8.6 MG/1
8.6 TABLET, FILM COATED ORAL
Status: SHIPPED | COMMUNITY
End: 2018-08-30

## 2018-08-30 RX ORDER — BISACODYL 10 MG
10 SUPPOSITORY, RECTAL RECTAL
Status: DISCONTINUED | OUTPATIENT
Start: 2018-08-30 | End: 2018-08-31 | Stop reason: HOSPADM

## 2018-08-30 RX ORDER — METOPROLOL SUCCINATE 25 MG/1
50 TABLET, EXTENDED RELEASE ORAL EVERY MORNING
Status: DISCONTINUED | OUTPATIENT
Start: 2018-08-31 | End: 2018-08-31 | Stop reason: HOSPADM

## 2018-08-30 RX ORDER — PROMETHAZINE HYDROCHLORIDE 25 MG/1
12.5-25 SUPPOSITORY RECTAL EVERY 4 HOURS PRN
Status: DISCONTINUED | OUTPATIENT
Start: 2018-08-30 | End: 2018-08-31 | Stop reason: HOSPADM

## 2018-08-30 RX ORDER — UREA 10 %
1 LOTION (ML) TOPICAL
Status: DISCONTINUED | OUTPATIENT
Start: 2018-08-30 | End: 2018-08-30

## 2018-08-30 RX ORDER — THYROID 60 MG/1
60 TABLET ORAL EVERY MORNING
Status: DISCONTINUED | OUTPATIENT
Start: 2018-08-31 | End: 2018-08-31 | Stop reason: HOSPADM

## 2018-08-30 RX ORDER — VITAMIN C
1 TAB ORAL DAILY
Status: DISCONTINUED | OUTPATIENT
Start: 2018-08-31 | End: 2018-08-31 | Stop reason: HOSPADM

## 2018-08-30 RX ORDER — AMOXICILLIN 250 MG
2 CAPSULE ORAL 2 TIMES DAILY
Status: DISCONTINUED | OUTPATIENT
Start: 2018-08-30 | End: 2018-08-31 | Stop reason: HOSPADM

## 2018-08-30 RX ORDER — ACETAMINOPHEN 325 MG/1
650 TABLET ORAL EVERY 6 HOURS PRN
Status: DISCONTINUED | OUTPATIENT
Start: 2018-08-30 | End: 2018-08-31 | Stop reason: HOSPADM

## 2018-08-30 RX ADMIN — ACETAMINOPHEN 162.5 MG: 325 TABLET, FILM COATED ORAL at 21:53

## 2018-08-30 RX ADMIN — IOHEXOL 75 ML: 350 INJECTION, SOLUTION INTRAVENOUS at 16:03

## 2018-08-30 RX ADMIN — SODIUM CHLORIDE: 9 INJECTION, SOLUTION INTRAVENOUS at 21:49

## 2018-08-30 ASSESSMENT — ENCOUNTER SYMPTOMS
SORE THROAT: 0
MYALGIAS: 0
NECK PAIN: 1
PALPITATIONS: 1
CLAUDICATION: 0
WHEEZING: 0
ABDOMINAL PAIN: 0
BACK PAIN: 0
FOCAL WEAKNESS: 0
COUGH: 0
NAUSEA: 0
DEPRESSION: 0
CHILLS: 0
HEADACHES: 0
BRUISES/BLEEDS EASILY: 0
DIARRHEA: 0
HEMOPTYSIS: 0
SPEECH CHANGE: 0
SHORTNESS OF BREATH: 0
SENSORY CHANGE: 0
FEVER: 0
SPUTUM PRODUCTION: 0
WEAKNESS: 0
EYE PAIN: 0
DIZZINESS: 0
CONSTIPATION: 0
EYE DISCHARGE: 0
VOMITING: 0
LOSS OF CONSCIOUSNESS: 0
DIAPHORESIS: 0

## 2018-08-30 ASSESSMENT — LIFESTYLE VARIABLES
EVER_SMOKED: YES
ALCOHOL_USE: NO
SUBSTANCE_ABUSE: 0

## 2018-08-30 ASSESSMENT — COGNITIVE AND FUNCTIONAL STATUS - GENERAL
MOBILITY SCORE: 24
DAILY ACTIVITIY SCORE: 24
SUGGESTED CMS G CODE MODIFIER DAILY ACTIVITY: CH
SUGGESTED CMS G CODE MODIFIER MOBILITY: CH

## 2018-08-30 ASSESSMENT — PATIENT HEALTH QUESTIONNAIRE - PHQ9
2. FEELING DOWN, DEPRESSED, IRRITABLE, OR HOPELESS: NOT AT ALL
1. LITTLE INTEREST OR PLEASURE IN DOING THINGS: NOT AT ALL
SUM OF ALL RESPONSES TO PHQ9 QUESTIONS 1 AND 2: 0

## 2018-08-30 ASSESSMENT — PAIN SCALES - GENERAL
PAINLEVEL_OUTOF10: 6
PAINLEVEL_OUTOF10: 6

## 2018-08-30 NOTE — ED PROVIDER NOTES
ED Provider Note    CHIEF COMPLAINT  Chief Complaint   Patient presents with   • Chest Pain     chest pain on and off x one week   Diarrhea x one week  neck pain  pain down arms         HPI  Seniamartinez Valle is a 57 y.o. female who presents to the emergency department complaining of chest discomfort.  The patient's been having chest discomfort on and off for about a week.  Feels like heartburn she states the middle of her chest.  Occasionally goes towards her back.  She has been having intermittent pain up the right side of her neck and down her left arm.  Unsure if this is related to her chronic neck problems or if is related to chest discomfort.  She has had mild shortness of breath particularly with exertion.  May be more than normal.  As well as some nausea and some diarrhea.  Patient had diarrhea for the last several days.  Little bit more than normal may be separate times a day watery diarrhea.  No foreign travel sick contacts recent antibiotic use or unfiltered water.  Nothing makes this better or worse.    Patient did have left hip replacement in May.  Since that time she had intermittent swelling of her legs more on the left than the right.  No history of PE or DVT.    She had a stress test about 6 years ago.  She has high blood pressure, and high cholesterol and a remote history of tobacco use.  No family history of heart disease.  No drug abuse    REVIEW OF SYSTEMS  See HPI for further details. All other systems are negative.    PAST MEDICAL HISTORY  Past Medical History:   Diagnosis Date   • Anxiety disorder 05/2018    not an issue at this time   • Back pain    • Bowel habit changes     constipation/diarrhea   • Bruxism    • Chest pain, atypical 8/26/2013   • Degeneration of cervical intervertebral disc    • Dizziness    • Fibromyalgia    • Fibromyalgia muscle pain 8/26/2013   • Hypertension    • Hypertensive cardiovascular disease 12/30/2011   • Hypothyroidism    • Lumbosacral (joint) (ligament)  sprain    • Lumbosacral (joint) (ligament) sprain    • Obesity    • Pain 05/2018    all over   • SOB (shortness of breath) 8/26/2013    panic attacks   • Urinary incontinence     minor       FAMILY HISTORY  History reviewed. No pertinent family history.    SOCIAL HISTORY  Social History     Social History   • Marital status:      Spouse name: N/A   • Number of children: N/A   • Years of education: N/A     Social History Main Topics   • Smoking status: Former Smoker     Packs/day: 1.00     Years: 10.00     Types: Cigarettes     Quit date: 1/1/1988   • Smokeless tobacco: Never Used   • Alcohol use 1.2 oz/week     2 Glasses of wine per week      Comment: 5 per week    • Drug use: No   • Sexual activity: Not on file     Other Topics Concern   • Not on file     Social History Narrative   • No narrative on file       SURGICAL HISTORY  Past Surgical History:   Procedure Laterality Date   • HIP ARTH ANTERIOR TOTAL Left 5/25/2018    Procedure: HIP ARTHROPLASTY ANTERIOR TOTAL;  Surgeon: Valdez Ndiaye M.D.;  Location: SURGERY [facility];  Service: Orthopedics   • CHOLECYSTECTOMY     • GYN SURGERY      ablation    • OTHER NEUROLOGICAL SURG      C spine surgeries X2   • OTHER NEUROLOGICAL SURG      lower back X4   • OTHER ORTHOPEDIC SURGERY      bilateral hip arthroscopy   • OTHER SURGICAL PROCEDURE  cervical vertebral fusion   • OTHER SURGICAL PROCEDURE  hip repair   • OTHER SURGICAL PROCEDURE  lamenectomy decompress   • TONSILLECTOMY         CURRENT MEDICATIONS  Home Medications     Reviewed by Lesley Crowe (Pharmacy Tech) on 08/30/18 at 1612  Med List Status: Complete   Medication Last Dose Status   alprazolam (XANAX) 0.5 MG Tab 8/30/2018 Active   Ascorbic Acid (VITAMIN C PO) 8/30/2018 Active   B Complex Vitamins (VITAMIN-B COMPLEX PO) 8/30/2018 Active   Cholecalciferol (VITAMIN D PO) 8/30/2018 Active   MAGNESIUM PO 8/30/2018 Active   Melatonin 1 MG Tab 8/29/2018 Active   metoprolol SR (TOPROL XL) 50  "MG TABLET SR 24 HR 8/30/2018 Active   progesterone (PROMETRIUM) 200 MG capsule 8/29/2018 Active   thyroid (ARMOUR THYROID) 60 MG TABS 8/30/2018 Active                ALLERGIES  Allergies   Allergen Reactions   • Banana Anaphylaxis   • Demerol Itching     itching   • Diazepam Anxiety   • Flexeril [Cyclobenzaprine Hcl] Unspecified     \"Makes me crazy\"   • Nsaids Unspecified     GI pain   • Percocet [Oxycodone-Acetaminophen] Itching     Teeth grinding   • Vicodin [Hydrocodone-Acetaminophen] Itching     Low dose OK (if takes more than 1/2 a pill)   • Gabapentin      \"makes me exhausted\"   • Tylenol Itching   • Ultram [Tramadol]      \"stopped it many years ago for a reason but cannot remember why\"       PHYSICAL EXAM  VITAL SIGNS: /73   Pulse 79   Temp 37.1 °C (98.8 °F)   Resp 18   Ht 1.626 m (5' 4\")   Wt 96 kg (211 lb 10.3 oz)   SpO2 95%   BMI 36.33 kg/m²      Constitutional: Well developed, Well nourished, No acute distress, Non-toxic appearance.   HENT: Normocephalic, Atraumatic, Bilateral external ears normal, Oropharynx moist, No oral exudates, Nose normal.   Eyes: PERRL, EOMI, Conjunctiva normal, No discharge.   Neck: Normal range of motion, No tenderness, Supple, No stridor.   Lymphatic: No lymphadenopathy noted.   Cardiovascular: Normal heart rate, Normal rhythm, No murmurs, No rubs, No gallops.   Thorax & Lungs: Normal breath sounds, No respiratory distress, No wheezing, some tenderness at the sternal costal margin.  This does not reproduce her pain.  Abdomen: Bowel sounds normal, Soft, No tenderness,   Skin: Warm, Dry, No erythema, No rash.   Back: No tenderness, No CVA tenderness..   Musculoskeletal: Good range of motion in all major joints.  Left leg is about a centimeter larger in diameter than the right.  No calf tenderness.  Good pulses  Neurologic: Alert & oriented x 3, No focal deficits noted.   Psychiatric: Affect anxious    EKG    Results for orders placed or performed during the hospital " encounter of 08/30/18   Troponin   Result Value Ref Range    Troponin I <0.02 0.00 - 0.04 ng/mL   Btype Natriuretic Peptide   Result Value Ref Range    B Natriuretic Peptide 40 0 - 100 pg/mL   CBC with Differential   Result Value Ref Range    WBC 7.5 4.8 - 10.8 K/uL    RBC 4.78 4.20 - 5.40 M/uL    Hemoglobin 13.6 12.0 - 16.0 g/dL    Hematocrit 41.9 37.0 - 47.0 %    MCV 87.7 81.4 - 97.8 fL    MCH 28.5 27.0 - 33.0 pg    MCHC 32.5 (L) 33.6 - 35.0 g/dL    RDW 44.1 35.9 - 50.0 fL    Platelet Count 242 164 - 446 K/uL    MPV 9.9 9.0 - 12.9 fL    Neutrophils-Polys 52.10 44.00 - 72.00 %    Lymphocytes 38.50 22.00 - 41.00 %    Monocytes 8.00 0.00 - 13.40 %    Eosinophils 0.90 0.00 - 6.90 %    Basophils 0.40 0.00 - 1.80 %    Immature Granulocytes 0.10 0.00 - 0.90 %    Nucleated RBC 0.00 /100 WBC    Neutrophils (Absolute) 3.89 2.00 - 7.15 K/uL    Lymphs (Absolute) 2.88 1.00 - 4.80 K/uL    Monos (Absolute) 0.60 0.00 - 0.85 K/uL    Eos (Absolute) 0.07 0.00 - 0.51 K/uL    Baso (Absolute) 0.03 0.00 - 0.12 K/uL    Immature Granulocytes (abs) 0.01 0.00 - 0.11 K/uL    NRBC (Absolute) 0.00 K/uL   Complete Metabolic Panel (CMP)   Result Value Ref Range    Sodium 141 135 - 145 mmol/L    Potassium 3.9 3.6 - 5.5 mmol/L    Chloride 106 96 - 112 mmol/L    Co2 26 20 - 33 mmol/L    Anion Gap 9.0 0.0 - 11.9    Glucose 112 (H) 65 - 99 mg/dL    Bun 12 8 - 22 mg/dL    Creatinine 0.73 0.50 - 1.40 mg/dL    Calcium 9.1 8.4 - 10.2 mg/dL    AST(SGOT) 29 12 - 45 U/L    ALT(SGPT) 25 2 - 50 U/L    Alkaline Phosphatase 52 30 - 99 U/L    Total Bilirubin 0.7 0.1 - 1.5 mg/dL    Albumin 4.1 3.2 - 4.9 g/dL    Total Protein 7.3 6.0 - 8.2 g/dL    Globulin 3.2 1.9 - 3.5 g/dL    A-G Ratio 1.3 g/dL   Prothrombin Time   Result Value Ref Range    PT 13.2 12.0 - 14.6 sec    INR 1.01 0.87 - 1.13   APTT   Result Value Ref Range    APTT 24.2 (L) 24.7 - 36.0 sec   Lipase   Result Value Ref Range    Lipase 24 7 - 58 U/L   D-DIMER   Result Value Ref Range    D-Dimer Screen  320 (H) <250 ng/mL(D-DU)   ESTIMATED GFR   Result Value Ref Range    GFR If African American >60 >60 mL/min/1.73 m 2    GFR If Non African American >60 >60 mL/min/1.73 m 2   EKG (ER)   Result Value Ref Range    Report       [facility] Emergency Dept.    Test Date:  2018  Pt Name:    BETTY ALLEN          Department: [facility]  MRN:        [id number]                      Room:  Gender:     Female                       Technician: ARIS  :        1960                   Requested By:ER TRIAGE PROTOCOL  Order #:    [id number]                    Reading MD: MATT KRISHNAN. [facility]    Measurements  Intervals                                Axis  Rate:       75                           P:          58  GA:         140                          QRS:        26  QRSD:       100                          T:          1  QT:         400  QTc:        447    Interpretive Statements  SINUS RHYTHM  PROBABLE LEFT ATRIAL ABNORMALITY  BASELINE WANDER IN LEAD(S) V3  Compared to ECG 2018 13:57:24  Poor R-wave progression no longer present    Electronically Signed On 2018 14:21:18 PDT by MATT KRISHNAN. [facility]          RADIOLOGY/PROCEDURES  CT-CTA CHEST PULMONARY ARTERY W/ RECONS   Final Result      1.  No evidence of pulmonary embolus.      2.  Fatty liver.      3.  Prior cholecystectomy.      DX-CHEST-LIMITED (1 VIEW)   Final Result      Mild cardiomegaly.      US-EXTREMITY VENOUS BILATERAL LOWER    (Results Pending)   NM-CARDIAC STRESS TEST    (Results Pending)   UE VENOUS DUPLEX    (Results Pending)         COURSE & MEDICAL DECISION MAKING  Pertinent Labs & Imaging studies reviewed. (See chart for details)  The patient presents the emergency department chest discomfort that radiates down her arm and a concerning history for ACS.  A broad original diagnosis was considered for chest pain including but not limited to acute coronary syndrome chest wall pain anxiety, GERD, dissection MI PE.  EKG was  nonspecific.  Labs were obtained.    The patient is only a few months out after hip replacement she has asymmetric leg swelling.  She is not clearable by the PERC criteria therefore, a d-dimer was ordered.  This was elevated and after I discussed this result with the patient CTA was ordered this was negative.  Her history was not consistent with a dissection.  There is no evidence of pneumonia or pneumothorax or other intrathoracic pathology on CT.  She had a stress test several years ago.  She has some cardiac risk factors in a pattern of pain that is suggestive of acute coronary syndrome therefore she will be admitted for further workup and treatment.  She is not given aspirin because of an aspirin allergy and intolerance.  She will be admitted to hospice for continued workup and treatment.  Care transferred to Dr. Peters    Patient's bowels are agreeable to plan.  She is admitted for further workup and treatment    FINAL IMPRESSION  1. Other chest pain        2.   3.         Electronically signed by: Juma Humphrey, 8/30/2018 2:53 PM

## 2018-08-30 NOTE — H&P
Hospital Medicine History & Physical Note    Date of Service  8/30/2018    Primary Care Physician  Stephon Rodríguez M.D.    Consultants  none    Code Status  Full code    Chief Complaint  Chest pain with palpitations x 1 week.    History of Presenting Illness  57 y.o. female who presented 8/30/2018 with history significant anterior cervical spine surgeries in 2002 and revision in 2013. She presents with fluttering in her chest associated with chest pain as well as left arm edema and pain in her lower humerus area. She is currently on progesterone as well as thyroid medication. She states she had a previous stress test at [place] in 2013 with subsequent angiogram that was normal. She says she is appropriate that her stress test could be read abnormal again and have to go through a angiogram. The patient is quite anxious about being able to leave the hospital tomorrow since she has a birthday this weekend.    Review of Systems  Review of Systems   Constitutional: Negative for chills, diaphoresis, fever and malaise/fatigue.   HENT: Negative for congestion and sore throat.    Eyes: Negative for pain and discharge.   Respiratory: Negative for cough, hemoptysis, sputum production, shortness of breath and wheezing.    Cardiovascular: Positive for chest pain and palpitations. Negative for claudication and leg swelling.   Gastrointestinal: Negative for abdominal pain, constipation, diarrhea, melena, nausea and vomiting.   Genitourinary: Negative for dysuria, frequency and urgency.   Musculoskeletal: Positive for neck pain. Negative for back pain, joint pain and myalgias.   Skin: Negative for itching and rash.   Neurological: Negative for dizziness, sensory change, speech change, focal weakness, loss of consciousness, weakness and headaches.   Endo/Heme/Allergies: Does not bruise/bleed easily.   Psychiatric/Behavioral: Negative for depression, substance abuse and suicidal ideas.       Past Medical History   has a past  "medical history of Anxiety disorder (05/2018); Back pain; Bowel habit changes; Bruxism; Chest pain, atypical (8/26/2013); Degeneration of cervical intervertebral disc; Dizziness; Fibromyalgia; Fibromyalgia muscle pain (8/26/2013); Hypertension; Hypertensive cardiovascular disease (12/30/2011); Hypothyroidism; Lumbosacral (joint) (ligament) sprain; Lumbosacral (joint) (ligament) sprain; Obesity; Pain (05/2018); SOB (shortness of breath) (8/26/2013); and Urinary incontinence.    Surgical History   has a past surgical history that includes other surgical procedure (cervical vertebral fusion); cholecystectomy; other surgical procedure (hip repair); other surgical procedure (lamenectomy decompress); tonsillectomy; other neurological surg; other neurological surg; gyn surgery; other orthopedic surgery; and hip arth anterior total (Left, 5/25/2018).     Family History  family history includes Alcohol abuse in her father; Cancer (age of onset: 94) in her maternal grandmother; No Known Problems in her mother.     Social History   reports that she quit smoking about 30 years ago. Her smoking use included Cigarettes. She has a 10.00 pack-year smoking history. She has never used smokeless tobacco. She reports that she drinks about 1.2 oz of alcohol per week . She reports that she does not use drugs.    Allergies  Allergies   Allergen Reactions   • Banana Anaphylaxis   • Demerol Itching     itching   • Diazepam Anxiety   • Flexeril [Cyclobenzaprine Hcl] Unspecified     \"Makes me crazy\"   • Nsaids Unspecified     GI pain   • Percocet [Oxycodone-Acetaminophen] Itching     Teeth grinding   • Vicodin [Hydrocodone-Acetaminophen] Itching     Low dose OK (if takes more than 1/2 a pill)   • Eggs Diarrhea     Severe diarrhea   • Gabapentin      \"makes me exhausted\"   • Gluten Meal      Severe diarrhea   • Tylenol Itching   • Ultram [Tramadol]      \"stopped it many years ago for a reason but cannot remember why\"       Medications  Prior " to Admission Medications   Prescriptions Last Dose Informant Patient Reported? Taking?   Ascorbic Acid (VITAMIN C PO) 8/30/2018 at am Patient Yes No   Sig: Take 1 Tab by mouth every day.   B Complex Vitamins (VITAMIN-B COMPLEX PO) 8/30/2018 at am Patient Yes No   Sig: Take 1 Tab by mouth every day.   Cholecalciferol (VITAMIN D PO) 8/30/2018 at am Patient Yes No   Sig: Take 5 mL by mouth every day. Unknown dose - OTC   MAGNESIUM PO 8/30/2018 at am Patient Yes No   Sig: Take 2 Tabs by mouth 2 Times a Day.   Melatonin 1 MG Tab 8/29/2018 at pm Patient Yes No   Sig: Take 1 Tab by mouth every bedtime.   alprazolam (XANAX) 0.5 MG Tab 8/30/2018 at am Patient Yes No   Sig: Take 0.125-0.25 mg by mouth at bedtime as needed for Anxiety.   metoprolol SR (TOPROL XL) 50 MG TABLET SR 24 HR 8/30/2018 at am Patient Yes No   Sig: Take 50 mg by mouth every morning.   progesterone (PROMETRIUM) 200 MG capsule 8/29/2018 at pm Patient Yes No   Sig: Take 200 mg by mouth every evening.   thyroid (ARMOUR THYROID) 60 MG TABS 8/30/2018 at am Patient Yes No   Sig: Take 60 mg by mouth every morning.      Facility-Administered Medications: None       Physical Exam  Blood Pressure: 150/73   Temperature: 37.1 °C (98.8 °F)   Pulse: 74   Respiration: (!) 30   Pulse Oximetry: 93 %     Physical Exam   Constitutional: She is oriented to person, place, and time. She appears well-developed and well-nourished. No distress.   HENT:   Head: Normocephalic and atraumatic.   Nose: Nose normal.   Mouth/Throat: Oropharynx is clear and moist. No oropharyngeal exudate.   Eyes: Pupils are equal, round, and reactive to light. Conjunctivae and EOM are normal. Right eye exhibits no discharge. Left eye exhibits no discharge. No scleral icterus.   Neck: Normal range of motion. Neck supple. No JVD present. No tracheal deviation present. No thyromegaly present.   Anterior cervical surgical scars well-healed   Cardiovascular: Normal rate, regular rhythm, normal heart sounds  and intact distal pulses.  Exam reveals no gallop and no friction rub.    No murmur heard.  Pulmonary/Chest: Effort normal and breath sounds normal. No stridor. No respiratory distress. She has no wheezes. She has no rales. She exhibits no tenderness.   Abdominal: Soft. Bowel sounds are normal. She exhibits no distension and no mass. There is no tenderness. There is no rebound and no guarding.   Musculoskeletal: Normal range of motion. She exhibits no edema or tenderness.   Edema noted left elbow region.   Lymphadenopathy:     She has no cervical adenopathy.   Neurological: She is alert and oriented to person, place, and time. No cranial nerve deficit. She exhibits normal muscle tone. Coordination normal.   Skin: Skin is warm and dry. No rash noted. She is not diaphoretic. No erythema.   Psychiatric: She has a normal mood and affect. Her behavior is normal. Judgment and thought content normal.   Nursing note and vitals reviewed.      Laboratory:  Recent Labs      08/30/18   1405   WBC  7.5   RBC  4.78   HEMOGLOBIN  13.6   HEMATOCRIT  41.9   MCV  87.7   MCH  28.5   MCHC  32.5*   RDW  44.1   PLATELETCT  242   MPV  9.9     Recent Labs      08/30/18   1405   SODIUM  141   POTASSIUM  3.9   CHLORIDE  106   CO2  26   GLUCOSE  112*   BUN  12   CREATININE  0.73   CALCIUM  9.1     Recent Labs      08/30/18   1405   ALTSGPT  25   ASTSGOT  29   ALKPHOSPHAT  52   TBILIRUBIN  0.7   LIPASE  24   GLUCOSE  112*     Recent Labs      08/30/18   1405   APTT  24.2*   INR  1.01     Recent Labs      08/30/18   1405   BNPBTYPENAT  40         Lab Results   Component Value Date    TROPONINI <0.02 08/30/2018       Urinalysis:    No results found     Imaging:  CT-CTA CHEST PULMONARY ARTERY W/ RECONS   Final Result      1.  No evidence of pulmonary embolus.      2.  Fatty liver.      3.  Prior cholecystectomy.      DX-CHEST-LIMITED (1 VIEW)   Final Result      Mild cardiomegaly.      NM-CARDIAC STRESS TEST    (Results Pending)   UE VENOUS  DUPLEX    (Results Pending)   LE VENOUS DUPLEX    (Results Pending)         Assessment/Plan:  I anticipate this patient is appropriate for observation status at this time.    * Chest pain, atypical- (present on admission)   Assessment & Plan    Patient complaining of chest pain with fluttering of her chest for the last 3 days. She is having symptoms in her left arm however it's more related to edema. EKG sinus rhythm at the rate of 75 was T elevations CT chest negative for PE repeat troponin check TSH and lipid panel ordered a nuclear medicine stress test 4 AM.        S/P hip replacement- (present on admission)   Assessment & Plan    Hip replacement May 2018 left total hip        Elevated d-dimer   Assessment & Plan    Elevated at 320, CTA chest negative for PE.  Patient is complaining of pain and swelling in her left upper arm. Ordered ultrasound of the left upper extremity as well as bilateral lower extremities.        Post menopausal syndrome- (present on admission)   Assessment & Plan    Patient currently on progesterone. I held it overnight.        Acquired hypothyroidism- (present on admission)   Assessment & Plan    On home thyroid. Check TSH. Continue home thyroid medication.        Hypertension- (present on admission)   Assessment & Plan    Continue with home blood pressure medication metoprolol 25 mg twice a day.            VTE prophylaxis: scd

## 2018-08-30 NOTE — ED NOTES
ERP at bedside. Pt agrees with plan of care discussed by ERP. IV established. Blood sent to lab. AIDET acknowledged with patient. María in low position, side rail up for pt safety. Call light within reach. Will continue to monitor.

## 2018-08-30 NOTE — ED NOTES
The Medication Reconciliation process has been completed by interviewing the patient    Allergies have been reviewed  Antibiotic use in 30 days - none    [facility]:  [facility]

## 2018-08-31 ENCOUNTER — PATIENT OUTREACH (OUTPATIENT)
Dept: HEALTH INFORMATION MANAGEMENT | Facility: OTHER | Age: 58
End: 2018-08-31

## 2018-08-31 ENCOUNTER — APPOINTMENT (OUTPATIENT)
Dept: RADIOLOGY | Facility: MEDICAL CENTER | Age: 58
End: 2018-08-31
Attending: HOSPITALIST
Payer: COMMERCIAL

## 2018-08-31 VITALS
DIASTOLIC BLOOD PRESSURE: 79 MMHG | RESPIRATION RATE: 16 BRPM | TEMPERATURE: 97.9 F | HEART RATE: 64 BPM | OXYGEN SATURATION: 96 % | WEIGHT: 214.07 LBS | SYSTOLIC BLOOD PRESSURE: 136 MMHG | BODY MASS INDEX: 36.55 KG/M2 | HEIGHT: 64 IN

## 2018-08-31 LAB
ANION GAP SERPL CALC-SCNC: 9 MMOL/L (ref 0–11.9)
BASOPHILS # BLD AUTO: 0.4 % (ref 0–1.8)
BASOPHILS # BLD: 0.03 K/UL (ref 0–0.12)
BUN SERPL-MCNC: 8 MG/DL (ref 8–22)
CALCIUM SERPL-MCNC: 8.7 MG/DL (ref 8.4–10.2)
CHLORIDE SERPL-SCNC: 108 MMOL/L (ref 96–112)
CHOLEST SERPL-MCNC: 186 MG/DL (ref 100–199)
CO2 SERPL-SCNC: 23 MMOL/L (ref 20–33)
CREAT SERPL-MCNC: 0.7 MG/DL (ref 0.5–1.4)
EOSINOPHIL # BLD AUTO: 0.09 K/UL (ref 0–0.51)
EOSINOPHIL NFR BLD: 1.2 % (ref 0–6.9)
ERYTHROCYTE [DISTWIDTH] IN BLOOD BY AUTOMATED COUNT: 44.7 FL (ref 35.9–50)
GLUCOSE SERPL-MCNC: 102 MG/DL (ref 65–99)
HCT VFR BLD AUTO: 39.2 % (ref 37–47)
HDLC SERPL-MCNC: 38 MG/DL
HGB BLD-MCNC: 12.5 G/DL (ref 12–16)
IMM GRANULOCYTES # BLD AUTO: 0.01 K/UL (ref 0–0.11)
IMM GRANULOCYTES NFR BLD AUTO: 0.1 % (ref 0–0.9)
LDLC SERPL CALC-MCNC: 98 MG/DL
LYMPHOCYTES # BLD AUTO: 3.58 K/UL (ref 1–4.8)
LYMPHOCYTES NFR BLD: 47 % (ref 22–41)
MCH RBC QN AUTO: 28.2 PG (ref 27–33)
MCHC RBC AUTO-ENTMCNC: 31.9 G/DL (ref 33.6–35)
MCV RBC AUTO: 88.3 FL (ref 81.4–97.8)
MONOCYTES # BLD AUTO: 0.74 K/UL (ref 0–0.85)
MONOCYTES NFR BLD AUTO: 9.7 % (ref 0–13.4)
NEUTROPHILS # BLD AUTO: 3.16 K/UL (ref 2–7.15)
NEUTROPHILS NFR BLD: 41.6 % (ref 44–72)
NRBC # BLD AUTO: 0 K/UL
NRBC BLD-RTO: 0 /100 WBC
PLATELET # BLD AUTO: 212 K/UL (ref 164–446)
PMV BLD AUTO: 9.7 FL (ref 9–12.9)
POTASSIUM SERPL-SCNC: 3.9 MMOL/L (ref 3.6–5.5)
RBC # BLD AUTO: 4.44 M/UL (ref 4.2–5.4)
SODIUM SERPL-SCNC: 140 MMOL/L (ref 135–145)
TRIGL SERPL-MCNC: 252 MG/DL (ref 0–149)
TROPONIN I SERPL-MCNC: <0.02 NG/ML (ref 0–0.04)
WBC # BLD AUTO: 7.6 K/UL (ref 4.8–10.8)

## 2018-08-31 PROCEDURE — A9270 NON-COVERED ITEM OR SERVICE: HCPCS | Performed by: HOSPITALIST

## 2018-08-31 PROCEDURE — 700102 HCHG RX REV CODE 250 W/ 637 OVERRIDE(OP): Performed by: HOSPITALIST

## 2018-08-31 PROCEDURE — 80061 LIPID PANEL: CPT

## 2018-08-31 PROCEDURE — 85025 COMPLETE CBC W/AUTO DIFF WBC: CPT

## 2018-08-31 PROCEDURE — A9502 TC99M TETROFOSMIN: HCPCS

## 2018-08-31 PROCEDURE — G0378 HOSPITAL OBSERVATION PER HR: HCPCS

## 2018-08-31 PROCEDURE — 700111 HCHG RX REV CODE 636 W/ 250 OVERRIDE (IP)

## 2018-08-31 PROCEDURE — 80048 BASIC METABOLIC PNL TOTAL CA: CPT

## 2018-08-31 PROCEDURE — 94760 N-INVAS EAR/PLS OXIMETRY 1: CPT

## 2018-08-31 PROCEDURE — 99217 PR OBSERVATION CARE DISCHARGE: CPT | Performed by: HOSPITALIST

## 2018-08-31 PROCEDURE — 84484 ASSAY OF TROPONIN QUANT: CPT

## 2018-08-31 RX ORDER — REGADENOSON 0.08 MG/ML
INJECTION, SOLUTION INTRAVENOUS
Status: COMPLETED
Start: 2018-08-31 | End: 2018-08-31

## 2018-08-31 RX ADMIN — LEVOTHYROXINE, LIOTHYRONINE 60 MG: 38; 9 TABLET ORAL at 05:27

## 2018-08-31 RX ADMIN — ACETAMINOPHEN 325 MG: 325 TABLET, FILM COATED ORAL at 05:28

## 2018-08-31 RX ADMIN — REGADENOSON 0.4 MG: 0.08 INJECTION, SOLUTION INTRAVENOUS at 09:10

## 2018-08-31 RX ADMIN — METOPROLOL SUCCINATE 50 MG: 25 TABLET, EXTENDED RELEASE ORAL at 10:38

## 2018-08-31 NOTE — PROGRESS NOTES
Pt resting in bed, no c/o pain or discomfort. Pt informed of lab draw around 0500. Declines needs at this time. Safety precautions in place.

## 2018-08-31 NOTE — PROGRESS NOTES
Report from Maricruz CHAMORRO. Pt up to floor via [facility]. Transferred to bed safely.

## 2018-08-31 NOTE — FLOWSHEET NOTE
08/30/18 2117   Events/Summary/Plan   Events/Summary/Plan cpap @ bedside, set-up and ready to use on bedside table   General Vent Information   Pulse Oximetry 94 %   Heart Rate (Monitored) 70   CPAP/BiPAP [employer]   Nocturnal CPAP or BiPAP CPAP   #System Evaluation Yes   Home Unit Used? Yes   Equipment Inspected for Cleanliness, Operation, and Safety? Yes   Settings (If Known) (prescribed setting)   FiO2 or LPM 0   Pt is currently not wearing CPAP/BiPap unit Yes   Home Mask Used? Yes

## 2018-08-31 NOTE — PROGRESS NOTES
Assessment complete, medicated per MAR. Discussed POC and allergies, allowed time to ask questions. Pt resting in bed, RR even and unlabored, no SOB or pain. Provided a washcloth and towel to cover pt eyes as the quiet pack mask was tight per pt. Pt educated to call for assistance. Declines additional needs at this time. Safety precautions in place. Call light within reach.

## 2018-08-31 NOTE — PROGRESS NOTES
ASHTYN Baugh gave bedside report to ASHTYN Love. Plan of care discussed. Safety precautions in place. Personal belongings and call light are with in reach. Patient has no additional needs at this time.

## 2018-08-31 NOTE — PROGRESS NOTES
"Patient back from stress test, ordered tray from dietary. Pt requesting not to be hooked up back to fluids. Educated patient on hydration, \"I will drink water.\" spoke with US will be up around 1230 for imaging. Pt aware. Declines other needs at this time.   "

## 2018-08-31 NOTE — DISCHARGE SUMMARY
Discharge Summary    CHIEF COMPLAINT ON ADMISSION  Chief Complaint   Patient presents with   • Chest Pain     chest pain on and off x one week   Diarrhea x one week  neck pain  pain down arms         Reason for Admission  chest pain     Admission Date  8/30/2018    CODE STATUS  Prior    HPI & HOSPITAL COURSE  This is a 57 y.o. female here with chest pain and palpitations. She was admitted and ruled out for a myocardial infarction with serial troponins and a negative stress test. Tele monitoring was normal as well. The etiology of her pain may be GI related and we discussed treatment of gerd symptoms. She has a component of anxiety as well which we discussed.        Therefore, she is discharged in good and stable condition to home with close outpatient follow-up.        Discharge Date  8/31/2018    FOLLOW UP ITEMS POST DISCHARGE  none    DISCHARGE DIAGNOSES  Principal Problem:    Chest pain, atypical POA: Yes  Active Problems:    S/P hip replacement POA: Yes    Hypertension POA: Yes    Acquired hypothyroidism POA: Yes    Post menopausal syndrome POA: Yes    Elevated d-dimer POA: Unknown  Resolved Problems:    * No resolved hospital problems. *      FOLLOW UP  No future appointments.  Stephon Rodríguez M.D.  [address] [facility] [id number]  [address]  [facility] [address]  [phone]    On 9/4/2018  Please walk into your Primary Care Provider office starting at 830am for your follow up thank you       MEDICATIONS ON DISCHARGE     Medication List      CONTINUE taking these medications      Instructions   ALPRAZolam 0.5 MG Tabs  Commonly known as:  XANAX   Take 0.125-0.25 mg by mouth at bedtime as needed for Anxiety.  Dose:  0.125-0.25 mg     JOSIANE THYROID 60 MG Tabs  Generic drug:  thyroid   Take 60 mg by mouth every morning.  Dose:  60 mg     MAGNESIUM PO   Take 2 Tabs by mouth 2 Times a Day.  Dose:  2 Tab     Melatonin 1 MG Tabs   Take 1 Tab by mouth every bedtime.  Dose:  1 Tab     metoprolol SR 50 MG Tb24  Commonly known as:  TOPROL XL    "Take 50 mg by mouth every morning.  Dose:  50 mg     progesterone 200 MG capsule  Commonly known as:  PROMETRIUM   Take 200 mg by mouth every evening.  Dose:  200 mg     VITAMIN C PO   Take 1 Tab by mouth every day.  Dose:  1 Tab     VITAMIN D PO   Take 5 mL by mouth every day. Unknown dose - OTC  Dose:  5 mL     VITAMIN-B COMPLEX PO   Take 1 Tab by mouth every day.  Dose:  1 Tab            Allergies  Allergies   Allergen Reactions   • Banana Anaphylaxis   • Demerol Itching     itching   • Diazepam Anxiety   • Flexeril [Cyclobenzaprine Hcl] Unspecified     \"Makes me crazy\"   • Nsaids Unspecified     GI pain   • Percocet [Oxycodone-Acetaminophen] Itching     Teeth grinding   • Vicodin [Hydrocodone-Acetaminophen] Itching     Low dose OK (if takes more than 1/2 a pill)   • Eggs Diarrhea     Severe diarrhea   • Gabapentin      \"makes me exhausted\"   • Gluten Meal      Severe diarrhea   • Tylenol Itching   • Ultram [Tramadol]      \"stopped it many years ago for a reason but cannot remember why\"       DIET  No orders of the defined types were placed in this encounter.      ACTIVITY  As tolerated.  Weight bearing as tolerated    CONSULTATIONS  none    PROCEDURES  none    LABORATORY  Lab Results   Component Value Date    SODIUM 140 08/31/2018    POTASSIUM 3.9 08/31/2018    CHLORIDE 108 08/31/2018    CO2 23 08/31/2018    GLUCOSE 102 (H) 08/31/2018    BUN 8 08/31/2018    CREATININE 0.70 08/31/2018        Lab Results   Component Value Date    WBC 7.6 08/31/2018    HEMOGLOBIN 12.5 08/31/2018    HEMATOCRIT 39.2 08/31/2018    PLATELETCT 212 08/31/2018        Total time of the discharge process exceeds 34 minutes.  "

## 2018-08-31 NOTE — PROGRESS NOTES
discharge instructions provided, verbalized understanding at this time.  at bedside. Refused escort and wheelchair. All belonging with patient at this time. No new medication to go over. PIV discharged, tip intact.

## 2018-08-31 NOTE — PROGRESS NOTES
Bedside report received from Erica CHAMORRO. Pt resting in bed, RR even and unlabored. Family at bedside. Safety precautions in place, call light in place.

## 2018-08-31 NOTE — ASSESSMENT & PLAN NOTE
Patient complaining of chest pain with fluttering of her chest for the last 3 days. She is having symptoms in her left arm however it's more related to edema. EKG sinus rhythm at the rate of 75 was T elevations CT chest negative for PE repeat troponin check TSH and lipid panel ordered a nuclear medicine stress test 4 AM.

## 2018-08-31 NOTE — PROGRESS NOTES
Nursing care plan includes knowledge deficit, potential for discomfort, potential for compromised cardiac output. POC includes teaching, comfort measures and reassurance, and access to code cart, cardiology stand by and availability of rapid response team. Pt verbalizes good understanding of benefits and risks of pharmacological cardiac stress test. Informed consent obtained. Lexiscan given, pt developed the following symptoms anxiety, light-headed, and GI upset. VS stable, major symptoms resolved. To waiting room, caffeinated fluids and/or snacks given. Nursing goals met.

## 2018-08-31 NOTE — PROGRESS NOTES
Bedside report given to Lupis CHAMORRO. POC discussed. Pt resting comfortably in bed. Safety precautions in place.

## 2018-08-31 NOTE — ASSESSMENT & PLAN NOTE
Elevated at 320, CTA chest negative for PE.  Patient is complaining of pain and swelling in her left upper arm. Ordered ultrasound of the left upper extremity as well as bilateral lower extremities.

## 2018-08-31 NOTE — DISCHARGE INSTRUCTIONS
Discharge Instructions    Discharged to home by car with relative. Discharged via wheelchair, hospital escort: Yes.  Special equipment needed: Not Applicable    Be sure to schedule a follow-up appointment with your primary care doctor or any specialists as instructed.     Discharge Plan:   Diet Plan: Discussed  Activity Level: Discussed  Confirmed Follow up Appointment: Patient to Call and Schedule Appointment  Confirmed Symptoms Management: Discussed  Medication Reconciliation Updated: Yes  Influenza Vaccine Indication: Patient Refuses    I understand that a diet low in cholesterol, fat, and sodium is recommended for good health. Unless I have been given specific instructions below for another diet, I accept this instruction as my diet prescription.   Other diet: None    Special Instructions: None    · Is patient discharged on Warfarin / Coumadin?   No     Depression / Suicide Risk    As you are discharged from this [facility] facility, it is important to learn how to keep safe from harming yourself.    Recognize the warning signs:  · Abrupt changes in personality, positive or negative- including increase in energy   · Giving away possessions  · Change in eating patterns- significant weight changes-  positive or negative  · Change in sleeping patterns- unable to sleep or sleeping all the time   · Unwillingness or inability to communicate  · Depression  · Unusual sadness, discouragement and loneliness  · Talk of wanting to die  · Neglect of personal appearance   · Rebelliousness- reckless behavior  · Withdrawal from people/activities they love  · Confusion- inability to concentrate     If you or a loved one observes any of these behaviors or has concerns about self-harm, here's what you can do:  · Talk about it- your feelings and reasons for harming yourself  · Remove any means that you might use to hurt yourself (examples: pills, rope, extension cords, firearm)  · Get professional help from the community (Mental  Health, Substance Abuse, psychological counseling)  · Do not be alone:Call your Safe Contact- someone whom you trust who will be there for you.  · Call your local CRISIS HOTLINE 112-5360 or 647-434-1120  · Call your local Children's Mobile Crisis Response Team [place] (625) 729-1900 or www.LiveIntent  · Call the toll free National Suicide Prevention Hotlines   · National Suicide Prevention Lifeline 274-777-MFEM (5267)  · [place] Line Network 800-SUICIDE (966-7710)    Chest Pain Observation  It is often hard to give a specific diagnosis for the cause of chest pain. Among other possibilities your symptoms might be caused by inadequate oxygen delivery to your heart (angina). Angina that is not treated or evaluated can lead to a heart attack (myocardial infarction) or death.  Blood tests, electrocardiograms, and X-rays may have been done to help determine a possible cause of your chest pain. After evaluation and observation, your health care provider has determined that it is unlikely your pain was caused by an unstable condition that requires hospitalization. However, a full evaluation of your pain may need to be completed, with additional diagnostic testing as directed. It is very important to keep your follow-up appointments. Not keeping your follow-up appointments could result in permanent heart damage, disability, or death. If there is any problem keeping your follow-up appointments, you must call your health care provider.  HOME CARE INSTRUCTIONS   Due to the slight chance that your pain could be angina, it is important to follow your health care provider's treatment plan and also maintain a healthy lifestyle:  · Maintain or work toward achieving a healthy weight.  · Stay physically active and exercise regularly.  · Decrease your salt intake.  · Eat a balanced, healthy diet. Talk to a dietitian to learn about heart-healthy foods.  · Increase your fiber intake by including whole grains,  vegetables, fruits, and nuts in your diet.  · Avoid situations that cause stress, anger, or depression.  · Take medicines as advised by your health care provider. Report any side effects to your health care provider. Do not stop medicines or adjust the dosages on your own.  · Quit smoking. Do not use nicotine patches or gum until you check with your health care provider.  · Keep your blood pressure, blood sugar, and cholesterol levels within normal limits.  · Limit alcohol intake to no more than 1 drink per day for women who are not pregnant and 2 drinks per day for men.  · Do not abuse drugs.  SEEK IMMEDIATE MEDICAL CARE IF:  You have severe chest pain or pressure which may include symptoms such as:  · You feel pain or pressure in your arms, neck, jaw, or back.  · You have severe back or abdominal pain, feel sick to your stomach (nauseous), or throw up (vomit).  · You are sweating profusely.  · You are having a fast or irregular heartbeat.  · You feel short of breath while at rest.  · You notice increasing shortness of breath during rest, sleep, or with activity.  · You have chest pain that does not get better after rest or after taking your usual medicine.  · You wake from sleep with chest pain.  · You are unable to sleep because you cannot breathe.  · You develop a frequent cough or you are coughing up blood.  · You feel dizzy, faint, or experience extreme fatigue.  · You develop severe weakness, dizziness, fainting, or chills.  Any of these symptoms may represent a serious problem that is an emergency. Do not wait to see if the symptoms will go away. Call your local emergency services (911 in the [place] Do not drive yourself to the hospital.  MAKE SURE YOU:  · Understand these instructions.  · Will watch your condition.  · Will get help right away if you are not doing well or get worse.     This information is not intended to replace advice given to you by your health care provider. Make sure you discuss any  questions you have with your health care provider.     Document Released: 01/20/2012 Document Revised: 12/23/2014 Document Reviewed: 06/19/2014  [employer] Interactive Patient Education ©2016 [employer]vier Inc.      Heartburn  Heartburn is a type of pain or discomfort that can happen in the throat or chest. It is often described as a burning pain. It may also cause a bad taste in the mouth. Heartburn may feel worse when you lie down or bend over, and it is often worse at night. Heartburn may be caused by stomach contents that move back up into the esophagus (reflux).  Follow these instructions at home:  Take these actions to decrease your discomfort and to help avoid complications.  Diet  · Follow a diet as recommended by your health care provider. This may involve avoiding foods and drinks such as:  ¨ Coffee and tea (with or without caffeine).  ¨ Drinks that contain alcohol.  ¨ Energy drinks and sports drinks.  ¨ Carbonated drinks or sodas.  ¨ Chocolate and cocoa.  ¨ Peppermint and mint flavorings.  ¨ Garlic and onions.  ¨ Horseradish.  ¨ Spicy and acidic foods, including peppers, chili powder, riley powder, vinegar, hot sauces, and barbecue sauce.  ¨ Citrus fruit juices and citrus fruits, such as oranges, clara, and limes.  ¨ Tomato-based foods, such as red sauce, chili, salsa, and pizza with red sauce.  ¨ Fried and fatty foods, such as donuts, french fries, potato chips, and high-fat dressings.  ¨ High-fat meats, such as hot dogs and fatty cuts of red and white meats, such as rib eye steak, sausage, ham, and watson.  ¨ High-fat dairy items, such as whole milk, butter, and cream cheese.  · Eat small, frequent meals instead of large meals.  · Avoid drinking large amounts of liquid with your meals.  · Avoid eating meals during the 2-3 hours before bedtime.  · Avoid lying down right after you eat.  · Do not exercise right after you eat.  General instructions  · Pay attention to any changes in your symptoms.  · Take  over-the-counter and prescription medicines only as told by your health care provider. Do not take aspirin, ibuprofen, or other NSAIDs unless your health care provider told you to do so.  · Do not use any tobacco products, including cigarettes, chewing tobacco, and e-cigarettes. If you need help quitting, ask your health care provider.  · Wear loose-fitting clothing. Do not wear anything tight around your waist that causes pressure on your abdomen.  · Raise (elevate) the head of your bed about 6 inches (15 cm).  · Try to reduce your stress, such as with yoga or meditation. If you need help reducing stress, ask your health care provider.  · If you are overweight, reduce your weight to an amount that is healthy for you. Ask your health care provider for guidance about a safe weight loss goal.  · Keep all follow-up visits as told by your health care provider. This is important.  Contact a health care provider if:  · You have new symptoms.  · You have unexplained weight loss.  · You have difficulty swallowing, or it hurts to swallow.  · You have wheezing or a persistent cough.  · Your symptoms do not improve with treatment.  · You have frequent heartburn for more than two weeks.  Get help right away if:  · You have pain in your arms, neck, jaw, teeth, or back.  · You feel sweaty, dizzy, or light-headed.  · You have chest pain or shortness of breath.  · You vomit and your vomit looks like blood or coffee grounds.  · Your stool is bloody or black.  This information is not intended to replace advice given to you by your health care provider. Make sure you discuss any questions you have with your health care provider.  Document Released: 05/06/2010 Document Revised: 05/25/2017 Document Reviewed: 04/13/2016  "[employer] Interactive Patient Education © 2017 "[employer] Inc.    Infection Control in the Home  If you have an infection or you are taking care of someone who has an infection, it is important to know how to keep the  infection from spreading. Follow these guidelines to help stop the spread of infection, and talk to your health care provider.  HOW ARE INFECTIONS SPREAD?  In order for an infection to spread, the following must be present:  · A germ. This may be a virus, bacteria, fungus, or parasite.  · A place for the germ to live. This may be:  ¨ On or in a person, animal, plant, or food.  ¨ In soil or water.  ¨ On surfaces, such as a door handle.  · A susceptible host. This is a person or animal who does not have resistance (immunity) to the germ.  · A way for the germ to enter the host. This may occur by:  ¨ Direct contact. This may happen by making contact--such as shaking hands or hugging--with an infected person or animal. Some germs can also travel through the air and spread to you if an infected person coughs or sneezes on you or near you.  ¨ Indirect contact. This is when the germ enters the host through contact with an infected object. Examples include eating contaminated food, drinking contaminated water, or touching a contaminated surface with your hands and then touching your face, nose, or mouth soon after that.  HOW CAN I HELP TO PREVENT INFECTION FROM SPREADING?  There are several things that you can do to help prevent infection from spreading.  Hand Washing  It is very important to wash your hands correctly, following these steps:  2. Wet your hands with clean, running water.  3. Apply soap to your hands. Liquid soap is better than bar soap.  4. Rub your hands together quickly to create lather.  5. Keep rubbing your hands together for at least 20 seconds. Thoroughly scrub all parts of your hands, including under your fingernails and between your fingers.  6. Rinse your hands with clean, running water until all of the soap is gone.  7. Dry your hands with an air dryer or a clean paper or cloth towel, or let your hands air-dry. Do not use your clothing or a soiled towel to dry your hands.  8. If you are in a public  restroom, use your towel to turn off the water faucet and to open the bathroom door.  Make sure to wash your hands:  · Before:  ¨ Visiting a baby or anyone with a weakened or lowered defense (immune) system.  ¨ Putting in and taking out any contact lenses.  · After:  ¨ Working or playing outside.  ¨ Touching an animal or its toys or leash.  ¨ Handling livestock.  ¨ Using the bathroom or helping a child or adult to use the bathroom.  ¨ Using household  or toxic chemicals.  ¨ Touching or taking out the garbage.  ¨ Touching anything dirty around your home.  ¨ Handling soiled clothes or rags.  ¨ Taking care of a sick child. This includes touching used tissues, toys, and clothes.  ¨ Sneezing, coughing, or blowing your nose.  ¨ Using public transportation.  ¨ Shaking hands.  ¨ Using a phone, including your mobile phone.  ¨ Touching money.  · Before and after:  ¨ Preparing food.  ¨ Preparing a bottle for a baby.  ¨ Feeding a baby or a young child.  ¨ Eating.  ¨ Visiting or taking care of someone who is sick.  ¨ Changing a diaper.  ¨ Changing a bandage (dressing) or taking care of an injury or wound.  ¨ Giving or taking medicine.  Taking Care of Your Home  · Make sure that you have enough cleaning supplies at all times. These include:  ¨ Disinfectants.  ¨ Reusable cleaning cloths. Wash these after each use.  ¨ Paper towels.  ¨ Utility gloves. Replace your gloves if they are cracked or torn or if they start to peel.  · Use bleach safely. Never mix it with other cleaning products, especially those that contain ammonia. This mixture can create a dangerous gas that may be deadly.  · Take care of your cleaning supplies. Toilet brushes, mops, and sponges can breed germs. Soak them in bleach and water for 5 minutes after each use.  · Do not pour used mop water down the sink. Pour it down the toilet instead.  · Maintain proper ventilation in your home.  · If you have a pet, ensure that your pet stays clean. Do not let  people with weak immune systems touch bird droppings, fish tank water, or a litter box.  ¨ If you have a cat, be sure to change the litter every day.  · In the bathroom, make sure you:  ¨ Provide liquid soap.  ¨ Change towels and washcloths frequently. Avoid sharing towels and washcloths.  ¨ Change toothbrushes often and store them separately in a clean, dry place.  ¨ Disinfect the toilet.  ¨ Clean the tub, shower, and sink with standard cleaning products.    ¨ Mop the floor with a standard .  ¨ Do not share personal items, such as razors, toothbrushes, drinking glasses, deodorant, sosa, brushes, towels, and washcloths.    · In the kitchen, make sure you:  ¨ Store food carefully.  ¨ Refrigerate leftovers promptly in covered containers.  ¨ Throw out stale or spoiled food.  ¨ Clean the inside of your refrigerator each week.  ¨ Keep your refrigerator set at 40°F (4°C) or less, and set your freezer at 0°F (-18°C) or less.  ¨ Thaw foods in the refrigerator or microwave, not at room temperature.  ¨ Serve foods at the proper temperature. Do not eat raw meat. Make sure it is cooked to the appropriate temperature. Cook eggs until they are firm.  ¨ Wash fruits and vegetables under running water.  ¨ Use separate cutting boards, plates, and utensils for raw foods and cooked foods.  ¨ Keep work surfaces clean.  ¨ Use a clean spoon each time you sample food while cooking.  ¨ Wash your dishes in hot, soapy water. Air-dry your dishes or use a .  ¨ Do not share forks, cups, or spoons during meals.  · Wear gloves if laundry is visibly soiled.  · Change linens each week or whenever they are soiled.  · Do not shake soiled linens. Doing that may send germs into the air. Put dressings, sanitary or incontinence pads, diapers, and gloves in plastic garbage bags for disposal.     This information is not intended to replace advice given to you by your health care provider. Make sure you discuss any questions you have with  your health care provider.     Document Released: 09/26/2009 Document Revised: 01/08/2016 Document Reviewed: 08/20/2015  [employer] Interactive Patient Education ©2016 [employer] Inc.

## 2018-08-31 NOTE — CARE PLAN
Problem: Communication  Goal: The ability to communicate needs accurately and effectively will improve  Outcome: PROGRESSING AS EXPECTED  Discussed use of pain scale, call light, medications and nonpharmacologic ways to control pain. Whiteboard updated, POC discussed.    Problem: Safety  Goal: Will remain free from injury  Outcome: PROGRESSING AS EXPECTED  Pt educated to call for assistance. Toileted before medications. Personal belongings within reach, room uncluttered.

## 2018-08-31 NOTE — PROGRESS NOTES
Received bedside report from Otilia CHAMORRO. Assumed care of pt who is resting in bed, RR even/unlabored. Fall protocol in place. CLIP. Will continue to monitor.

## 2018-10-05 ENCOUNTER — HOSPITAL ENCOUNTER (OUTPATIENT)
Dept: LAB | Facility: MEDICAL CENTER | Age: 58
End: 2018-10-05
Attending: OBSTETRICS & GYNECOLOGY
Payer: COMMERCIAL

## 2018-10-05 PROCEDURE — 87086 URINE CULTURE/COLONY COUNT: CPT

## 2018-10-05 PROCEDURE — 81003 URINALYSIS AUTO W/O SCOPE: CPT

## 2018-10-06 LAB
APPEARANCE UR: CLEAR
COLOR UR: NORMAL
GLUCOSE UR STRIP.AUTO-MCNC: NEGATIVE MG/DL
KETONES UR STRIP.AUTO-MCNC: NEGATIVE MG/DL
LEUKOCYTE ESTERASE UR QL STRIP.AUTO: NEGATIVE
MICRO URNS: NORMAL
NITRITE UR QL STRIP.AUTO: NEGATIVE
PH UR STRIP.AUTO: 6.5 [PH]
PROT UR QL STRIP: NEGATIVE MG/DL
RBC UR QL AUTO: NEGATIVE
SP GR UR STRIP.AUTO: 1

## 2018-10-08 LAB
BACTERIA UR CULT: NORMAL
SIGNIFICANT IND 70042: NORMAL
SITE SITE: NORMAL
SOURCE SOURCE: NORMAL

## 2018-11-27 ENCOUNTER — APPOINTMENT (OUTPATIENT)
Dept: ADMISSIONS | Facility: MEDICAL CENTER | Age: 58
End: 2018-11-27
Attending: SURGERY
Payer: COMMERCIAL

## 2018-11-28 ENCOUNTER — APPOINTMENT (RX ONLY)
Dept: URBAN - METROPOLITAN AREA CLINIC 4 | Facility: CLINIC | Age: 58
Setting detail: DERMATOLOGY
End: 2018-11-28

## 2018-11-28 DIAGNOSIS — I78.8 OTHER DISEASES OF CAPILLARIES: ICD-10-CM

## 2018-11-28 DIAGNOSIS — L57.0 ACTINIC KERATOSIS: ICD-10-CM

## 2018-11-28 DIAGNOSIS — L73.8 OTHER SPECIFIED FOLLICULAR DISORDERS: ICD-10-CM

## 2018-11-28 DIAGNOSIS — L21.8 OTHER SEBORRHEIC DERMATITIS: ICD-10-CM

## 2018-11-28 PROCEDURE — 17000 DESTRUCT PREMALG LESION: CPT

## 2018-11-28 PROCEDURE — ? COUNSELING

## 2018-11-28 PROCEDURE — 99213 OFFICE O/P EST LOW 20 MIN: CPT | Mod: 25

## 2018-11-28 PROCEDURE — ? ADDITIONAL NOTES

## 2018-11-28 PROCEDURE — 17003 DESTRUCT PREMALG LES 2-14: CPT

## 2018-11-28 PROCEDURE — ? LIQUID NITROGEN

## 2018-11-28 ASSESSMENT — LOCATION ZONE DERM
LOCATION ZONE: FACE
LOCATION ZONE: LIP
LOCATION ZONE: SCALP

## 2018-11-28 ASSESSMENT — LOCATION DETAILED DESCRIPTION DERM
LOCATION DETAILED: LEFT INFERIOR CENTRAL MALAR CHEEK
LOCATION DETAILED: INFERIOR MID FOREHEAD
LOCATION DETAILED: LEFT NASOLABIAL FOLD
LOCATION DETAILED: LEFT INFERIOR VERMILION LIP
LOCATION DETAILED: RIGHT UPPER CUTANEOUS LIP
LOCATION DETAILED: LEFT MEDIAL FRONTAL SCALP

## 2018-11-28 ASSESSMENT — LOCATION SIMPLE DESCRIPTION DERM
LOCATION SIMPLE: INFERIOR FOREHEAD
LOCATION SIMPLE: RIGHT LIP
LOCATION SIMPLE: LEFT CHEEK
LOCATION SIMPLE: LEFT LIP
LOCATION SIMPLE: LEFT SCALP

## 2018-11-28 NOTE — PROCEDURE: COUNSELING
Detail Level: Detailed
Patient Specific Counseling (Will Not Stick From Patient To Patient): Offered Rx for ketoconazole shampoo or selenium/sulfur based wash/shampoo. Patient declines. Will try OTC shampoos as discussed.
Detail Level: Zone

## 2018-11-28 NOTE — PROCEDURE: ADDITIONAL NOTES
Additional Notes: Areas of concern left cheek left lower vermillion are clinically consistent with AK’s
Detail Level: Detailed
Additional Notes: Recommend evaluation with our cosmetic team/appointment with Bree Garcia RN, for treatment of benign lesions and possibly telangiectasia.

## 2018-12-03 ENCOUNTER — HOSPITAL ENCOUNTER (OUTPATIENT)
Facility: MEDICAL CENTER | Age: 58
End: 2018-12-03
Attending: SURGERY | Admitting: SURGERY
Payer: COMMERCIAL

## 2018-12-03 VITALS
OXYGEN SATURATION: 95 % | HEIGHT: 64 IN | WEIGHT: 216.05 LBS | DIASTOLIC BLOOD PRESSURE: 65 MMHG | TEMPERATURE: 97.8 F | HEART RATE: 73 BPM | RESPIRATION RATE: 16 BRPM | BODY MASS INDEX: 36.89 KG/M2 | SYSTOLIC BLOOD PRESSURE: 124 MMHG

## 2018-12-03 PROCEDURE — 160036 HCHG PACU - EA ADDL 30 MINS PHASE I: Performed by: SURGERY

## 2018-12-03 PROCEDURE — 160009 HCHG ANES TIME/MIN: Performed by: SURGERY

## 2018-12-03 PROCEDURE — 500868 HCHG NEEDLE, SURGI(VARES): Performed by: SURGERY

## 2018-12-03 PROCEDURE — 160002 HCHG RECOVERY MINUTES (STAT): Performed by: SURGERY

## 2018-12-03 PROCEDURE — 501838 HCHG SUTURE GENERAL: Performed by: SURGERY

## 2018-12-03 PROCEDURE — 700101 HCHG RX REV CODE 250

## 2018-12-03 PROCEDURE — 500002 HCHG ADHESIVE, DERMABOND: Performed by: SURGERY

## 2018-12-03 PROCEDURE — 160035 HCHG PACU - 1ST 60 MINS PHASE I: Performed by: SURGERY

## 2018-12-03 PROCEDURE — 160042 HCHG SURGERY MINUTES - EA ADDL 1 MIN LEVEL 5: Performed by: SURGERY

## 2018-12-03 PROCEDURE — 160046 HCHG PACU - 1ST 60 MINS PHASE II: Performed by: SURGERY

## 2018-12-03 PROCEDURE — 502714 HCHG ROBOTIC SURGERY SERVICES: Performed by: SURGERY

## 2018-12-03 PROCEDURE — 160048 HCHG OR STATISTICAL LEVEL 1-5: Performed by: SURGERY

## 2018-12-03 PROCEDURE — 160025 RECOVERY II MINUTES (STATS): Performed by: SURGERY

## 2018-12-03 PROCEDURE — 700111 HCHG RX REV CODE 636 W/ 250 OVERRIDE (IP)

## 2018-12-03 PROCEDURE — C1781 MESH (IMPLANTABLE): HCPCS | Performed by: SURGERY

## 2018-12-03 PROCEDURE — 700111 HCHG RX REV CODE 636 W/ 250 OVERRIDE (IP): Performed by: ANESTHESIOLOGY

## 2018-12-03 PROCEDURE — 160031 HCHG SURGERY MINUTES - 1ST 30 MINS LEVEL 5: Performed by: SURGERY

## 2018-12-03 DEVICE — MESH PHASIX ST 10X15CM: Type: IMPLANTABLE DEVICE | Status: FUNCTIONAL

## 2018-12-03 RX ORDER — METOPROLOL TARTRATE 1 MG/ML
1 INJECTION, SOLUTION INTRAVENOUS
Status: DISCONTINUED | OUTPATIENT
Start: 2018-12-03 | End: 2018-12-03 | Stop reason: HOSPADM

## 2018-12-03 RX ORDER — MIDAZOLAM HYDROCHLORIDE 1 MG/ML
1 INJECTION INTRAMUSCULAR; INTRAVENOUS
Status: DISCONTINUED | OUTPATIENT
Start: 2018-12-03 | End: 2018-12-03 | Stop reason: HOSPADM

## 2018-12-03 RX ORDER — HYDROMORPHONE HYDROCHLORIDE 1 MG/ML
0.1 INJECTION, SOLUTION INTRAMUSCULAR; INTRAVENOUS; SUBCUTANEOUS
Status: DISCONTINUED | OUTPATIENT
Start: 2018-12-03 | End: 2018-12-03 | Stop reason: HOSPADM

## 2018-12-03 RX ORDER — SODIUM CHLORIDE, SODIUM LACTATE, POTASSIUM CHLORIDE, CALCIUM CHLORIDE 600; 310; 30; 20 MG/100ML; MG/100ML; MG/100ML; MG/100ML
INJECTION, SOLUTION INTRAVENOUS ONCE
Status: DISCONTINUED | OUTPATIENT
Start: 2018-12-03 | End: 2018-12-03 | Stop reason: HOSPADM

## 2018-12-03 RX ORDER — HYDROMORPHONE HYDROCHLORIDE 1 MG/ML
0.2 INJECTION, SOLUTION INTRAMUSCULAR; INTRAVENOUS; SUBCUTANEOUS
Status: DISCONTINUED | OUTPATIENT
Start: 2018-12-03 | End: 2018-12-03 | Stop reason: HOSPADM

## 2018-12-03 RX ORDER — SODIUM CHLORIDE, SODIUM LACTATE, POTASSIUM CHLORIDE, CALCIUM CHLORIDE 600; 310; 30; 20 MG/100ML; MG/100ML; MG/100ML; MG/100ML
INJECTION, SOLUTION INTRAVENOUS CONTINUOUS
Status: DISCONTINUED | OUTPATIENT
Start: 2018-12-03 | End: 2018-12-03 | Stop reason: HOSPADM

## 2018-12-03 RX ORDER — ONDANSETRON 2 MG/ML
4 INJECTION INTRAMUSCULAR; INTRAVENOUS
Status: DISCONTINUED | OUTPATIENT
Start: 2018-12-03 | End: 2018-12-03 | Stop reason: HOSPADM

## 2018-12-03 RX ORDER — HYDRALAZINE HYDROCHLORIDE 20 MG/ML
5 INJECTION INTRAMUSCULAR; INTRAVENOUS
Status: DISCONTINUED | OUTPATIENT
Start: 2018-12-03 | End: 2018-12-03 | Stop reason: HOSPADM

## 2018-12-03 RX ORDER — HYDROMORPHONE HYDROCHLORIDE 1 MG/ML
0.4 INJECTION, SOLUTION INTRAMUSCULAR; INTRAVENOUS; SUBCUTANEOUS
Status: DISCONTINUED | OUTPATIENT
Start: 2018-12-03 | End: 2018-12-03 | Stop reason: HOSPADM

## 2018-12-03 RX ORDER — HALOPERIDOL 5 MG/ML
1 INJECTION INTRAMUSCULAR
Status: DISCONTINUED | OUTPATIENT
Start: 2018-12-03 | End: 2018-12-03 | Stop reason: HOSPADM

## 2018-12-03 RX ADMIN — HYDROMORPHONE HYDROCHLORIDE 0.2 MG: 1 INJECTION, SOLUTION INTRAMUSCULAR; INTRAVENOUS; SUBCUTANEOUS at 19:57

## 2018-12-03 RX ADMIN — HYDROMORPHONE HYDROCHLORIDE 0.4 MG: 1 INJECTION, SOLUTION INTRAMUSCULAR; INTRAVENOUS; SUBCUTANEOUS at 19:48

## 2018-12-03 RX ADMIN — HYDROMORPHONE HYDROCHLORIDE 0.4 MG: 1 INJECTION, SOLUTION INTRAMUSCULAR; INTRAVENOUS; SUBCUTANEOUS at 19:38

## 2018-12-03 RX ADMIN — MIDAZOLAM 0.5 MG: 1 INJECTION INTRAMUSCULAR; INTRAVENOUS at 20:21

## 2018-12-03 RX ADMIN — HYDROMORPHONE HYDROCHLORIDE 0.4 MG: 1 INJECTION, SOLUTION INTRAMUSCULAR; INTRAVENOUS; SUBCUTANEOUS at 20:19

## 2018-12-03 ASSESSMENT — PAIN SCALES - GENERAL
PAINLEVEL_OUTOF10: 5
PAINLEVEL_OUTOF10: 3
PAINLEVEL_OUTOF10: 5
PAINLEVEL_OUTOF10: 6
PAINLEVEL_OUTOF10: 7

## 2018-12-03 NOTE — PROGRESS NOTES
Med rec updated and complete  Allergies reviewed  Interviewed pt with family at bedside with permission from pt.  Pt reports no antibiotics in the last 30 days.

## 2018-12-04 NOTE — DISCHARGE INSTRUCTIONS
ACTIVITY: Rest and take it easy for the first 24 hours.  A responsible adult is recommended to remain with you during that time.  It is normal to feel sleepy.  We encourage you to not do anything that requires balance, judgment or coordination.    MILD FLU-LIKE SYMPTOMS ARE NORMAL. YOU MAY EXPERIENCE GENERALIZED MUSCLE ACHES, THROAT IRRITATION, HEADACHE AND/OR SOME NAUSEA.    FOR 24 HOURS DO NOT:  Drive, operate machinery or run household appliances.  Drink beer or alcoholic beverages.   Make important decisions or sign legal documents.    SPECIAL INSTRUCTIONS:   Laparoscopic Ventral Hernia Repair  Laparoscopic ventral hernia repairis a procedure to fix a bulge of tissue that pushes through a weak area of muscle in the abdomen (ventral hernia). A ventral hernia may be at the belly button (umbilical), above the belly button (epigastric), or at the incision site from previous abdominal surgery (incisional hernia). You may have this procedure as emergency surgery if part of your intestine gets trapped inside the hernia and starts to lose its blood supply (strangulation).  Laparoscopic surgery is done through small incisions using a thin surgical telescope with a light and camera on the end (laparoscope). During surgery, your surgeon will use images from the laparoscope to guide the procedure. A mesh screen will be placed in the hernia to close the opening and strengthen the abdominal wall.  Tell a health care provider about:  · Any allergies you have.  · All medicines you are taking, including vitamins, herbs, eye drops, creams, and over-the-counter medicines.  · Any problems you or family members have had with anesthetic medicines.  · Any blood disorders you have.  · Any surgeries you have had.  · Any medical conditions you have.  · Whether you are pregnant or may be pregnant.  What are the risks?  Generally, this is a safe procedure. However, problems may occur, including:  · Infection.  · Bleeding.  · Allergic  reactions to medicines.  · Damage to other structures or organs in the abdomen.  · Trouble urinating or having a bowel movement after surgery.  · Pneumonia.  · Blood clots.  · The hernia coming back after surgery.  · Fluid buildup in the area of the hernia.  In some cases, your health care provider may need to switch from a laparoscopic procedure to a procedure that is done through a single, larger incision in the abdomen (open procedure). You may need an open procedure if:  · You have a hernia that is difficult to repair.  · Your organs are hard to see.  · You have bleeding problems during the laparoscopic procedure.  What happens before the procedure?  Staying hydrated  Follow instructions from your health care provider about hydration, which may include:  · Up to 2 hours before the procedure - you may continue to drink clear liquids, such as water, clear fruit juice, black coffee, and plain tea.  Eating and drinking restrictions  Follow instructions from your health care provider about eating and drinking, which may include:  · 8 hours before the procedure - stop eating heavy meals or foods such as meat, fried foods, or fatty foods.  · 6 hours before the procedure - stop eating light meals or foods, such as toast or cereal.  · 6 hours before the procedure - stop drinking milk or drinks that contain milk.  · 2 hours before the procedure - stop drinking clear liquids.  Medicines  · Ask your health care provider about:  ¨ Changing or stopping your regular medicines. This is especially important if you are taking diabetes medicines or blood thinners.  ¨ Taking medicines such as aspirin and ibuprofen. These medicines can thin your blood. Do not take these medicines before your procedure if your health care provider instructs you not to.  · You may be given antibiotic medicine to help prevent infection.  General instructions  · You may be asked to take a laxative or do an enema to empty your bowel before surgery (bowel  prep).  · Do not use any products that contain nicotine or tobacco, such as cigarettes and e-cigarettes. If you need help quitting, ask your health care provider.  · You may need to have tests before the procedure, such as:  ¨ Blood tests.  ¨ Urine tests.  ¨ Abdominal ultrasound.  ¨ Chest X-ray.  ¨ Electrocardiogram (ECG).  · Plan to have someone take you home from the hospital or clinic.  · If you will be going home right after the procedure, plan to have someone with you for 24 hours.  What happens during the procedure?  · To reduce your risk of infection:  ¨ Your health care team will wash or sanitize their hands.  ¨ Your skin will be washed with soap.  · An IV tube will be inserted into one of your veins.  · You will be given one or more of the following:  ¨ A medicine to help you relax (sedative).  ¨ A medicine to make you fall asleep (general anesthetic).  · A small incision will be made in your abdomen. A hollow metal tube (trocar) will be placed through the incision.  · A tube will be placed through the trocar to inflate your abdomen with air-like gas. This makes it easier for your surgeon to see inside your abdomen and do the repair.  · The laparoscope will be inserted into your abdomen through the trocar. The laparoscope will send images to a monitor in the operating room.  · Other trocars will be put through other small incisions in your abdomen. The surgical instruments needed for the procedure will be placed through these trocars.  · The tissue or intestines that make up the hernia will be moved back into place.  · The edges of the hernia may be stitched together.  · A piece of mesh will be used to close the hernia. Stitches (sutures), clips, or staples will be used to keep the mesh in place.  · A bandage (dressing) or skin glue will be put over the incisions.  The procedure may vary among health care providers and hospitals.  What happens after the procedure?  · Your blood pressure, heart rate,  breathing rate, and blood oxygen level will be monitored until the medicines you were given have worn off.  · You will continue to receive fluids and medicines through an IV tube. Your IV tube will be removed when you can drink clear fluids.  · You will be given pain medicine as needed.  · You will be encouraged to get up and walk around as soon as possible.  · You may have to wear compression stockings. These stockings help to prevent blood clots and reduce swelling in your legs.  · You will be shown how to do deep breathing exercises to help prevent a lung infection.  · Do not drive for 24 hours if you were given a sedative.  This information is not intended to replace advice given to you by your health care provider. Make sure you discuss any questions you have with your health care provider.  Document Released: 12/04/2013 Document Revised: 08/04/2017 Document Reviewed: 08/04/2017  [employer] Patient Education © 2017 [employer]      DIET: To avoid nausea, slowly advance diet as tolerated, avoiding spicy or greasy foods for the first day.  Add more substantial food to your diet according to your physician's instructions.  Babies can be fed formula or breast milk as soon as they are hungry.  INCREASE FLUIDS AND FIBER TO AVOID CONSTIPATION.    SURGICAL DRESSING/BATHING: ***    FOLLOW-UP APPOINTMENT:  A follow-up appointment should be arranged with your doctor in ***; call to schedule.    You should CALL YOUR PHYSICIAN if you develop:  Fever greater than 101 degrees F.  Pain not relieved by medication, or persistent nausea or vomiting.  Excessive bleeding (blood soaking through dressing) or unexpected drainage from the wound.  Extreme redness or swelling around the incision site, drainage of pus or foul smelling drainage.  Inability to urinate or empty your bladder within 8 hours.  Problems with breathing or chest pain.    You should call 911 if you develop problems with breathing or chest pain.  If you  are unable to contact your doctor or surgical center, you should go to the nearest emergency room or urgent care center.  Physician's telephone #: [phone]    If any questions arise, call your doctor.  If your doctor is not available, please feel free to call the Surgical Center at [phone].  The Center is open Monday through Friday from 7AM to 7PM.  You can also call the HEALTH HOTLINE open 24 hours/day, 7 days/week and speak to a nurse at [phone], or toll free at [phone].    A registered nurse may call you a few days after your surgery to see how you are doing after your procedure.    MEDICATIONS: Resume taking daily medication.  Take prescribed pain medication with food.  If no medication is prescribed, you may take non-aspirin pain medication if needed.  PAIN MEDICATION CAN BE VERY CONSTIPATING.  Take a stool softener or laxative such as senokot, pericolace, or milk of magnesia if needed.      If your physician has prescribed pain medication that includes Acetaminophen (Tylenol), do not take additional Acetaminophen (Tylenol) while taking the prescribed medication.    Depression / Suicide Risk    As you are discharged from this [facility] facility, it is important to learn how to keep safe from harming yourself.    Recognize the warning signs:  · Abrupt changes in personality, positive or negative- including increase in energy   · Giving away possessions  · Change in eating patterns- significant weight changes-  positive or negative  · Change in sleeping patterns- unable to sleep or sleeping all the time   · Unwillingness or inability to communicate  · Depression  · Unusual sadness, discouragement and loneliness  · Talk of wanting to die  · Neglect of personal appearance   · Rebelliousness- reckless behavior  · Withdrawal from people/activities they love  · Confusion- inability to concentrate     If you or a loved one observes any of these behaviors or has concerns about self-harm,  here's what you can do:  · Talk about it- your feelings and reasons for harming yourself  · Remove any means that you might use to hurt yourself (examples: pills, rope, extension cords, firearm)  · Get professional help from the community (Mental Health, Substance Abuse, psychological counseling)  · Do not be alone:Call your Safe Contact- someone whom you trust who will be there for you.  · Call your local CRISIS HOTLINE 973-2790 or 224-593-9957  · Call your local Children's Mobile Crisis Response Team [place] (673) 061-5766 or www.[employer]  · Call the toll free National Suicide Prevention Hotlines   · National Suicide Prevention Lifeline 833-966-FUUB (4676)  · National Hope Line Network 800-SUICIDE (865-7179)

## 2018-12-04 NOTE — OR SURGEON
Immediate Post OP Note    PreOp Diagnosis: VIH     PostOp Diagnosis: same    Procedure(s):  VENTRAL HERNIA REPAIR ROBOTIC XI - INCISIONAL W/ pHasix ST MESH PLACEMENT - Wound Class: Clean    Surgeon(s):  BISHNU Rand M.D.    Anesthesiologist/Type of Anesthesia:  Anesthesiologist: Alirio Donald M.D./General    Surgical Staff:  Circulator: JERSON Gonzales Circulator: JERSON Florez Scrub: Deandra Ndiaye  Scrub Person: Leonid James    Specimens removed if any:  * No specimens in log *    Estimated Blood Loss: min      Findings: same    Complications: 0        12/3/2018 7:02 PM Jamie Randolph M.D.

## 2018-12-04 NOTE — OR NURSING
"Pt on room air.  No c/o nausea, tolerating sips of water.  Pain now \"tolerable\" per pt.  Ice pack on abdomen, pillow to splint.  Abdominal dressing CDI x 4 lap sites. Pt c/o \"gas\" in right upper quad, discussed need to ambulate to dissipate the gas.   VSS, afebrile, A/O x4, HOOK.     at bedside.    "

## 2018-12-04 NOTE — OR NURSING
Patient discharged home with spouse. Spouse and patient verbalized understanding of discharge instructions. Reports discomfort 2/10 in the RLQ that is tolerable. Denies nausea or vomiting. Transported via wheelchair to [facility] by staff member without incident.

## 2018-12-04 NOTE — OP REPORT
DATE OF SERVICE:  12/03/2018    PREOPERATIVE DIAGNOSIS:  Ventral incisional hernia.    POSTOPERATIVE DIAGNOSIS:  Ventral incisional hernia.    OPERATION:  Robotic-assisted laparoscopic transabdominal repair of ventral   incisional hernia with Phasix ST mesh implantation.    SURGEON:  Jamie Randolph MD    ANESTHESIA:  Alirio Donald MD    ASSISTANT:  Victorino Fitzpatrick MD    OPERATIVE NOTE:  The patient, 58 years of age, presents with a ventral   incisional hernia.  This appeared following laparoscopic cholecystectomy.  She   is felt to be a candidate for repair.  The risks, possible complications,   alternatives of approach were discussed with her in detail and she ultimately   elected to proceed with robotic-assisted laparoscopic repair.  She had a   satisfactory preoperative evaluation, received prophylactic antibiotics, had   postoperative care instructions explained to her in detail, had sequential   stockings applied as antiembolism prophylaxis.  She signed consents for   surgery and anesthesia, was taken to the operating room and placed under   anesthesia by Dr. Donald.  Once anesthetized, her abdomen was prepped with   ChloraPrep solution, sterile drapes were applied and a timeout was affected.    A solution of 0.5% Marcaine with epinephrine was liberally infiltrated in all   wounds.  Incision was made in the left upper quadrant and the fascia was   elevated.  This was pierced with a Veress needle.  Saline drop test was   permissive to proceed with step pneumo insufflation.  Once fully pneumo   insufflated, an 8 mm trocar was placed.  There was no evidence of injury   related to entry.  Patient had a visible hernia without incarceration.  An 8   mm trocar was placed in the far left abdomen laterally and a left lower   quadrant 8 mm trocar was placed.  Patient had the mesh and suture material   introduced into the abdomen.  The patient had the da Renny Xi robot brought in   and docked.  Instrumentation was introduced  along with the camera.  Dr. Randolph   retired to the console.  Patient had the defect and the hernia closed from   right to left using running 2-0 Stratafix in 3 layers.  This provided   satisfactory and snug closure.  We incorporated part of the sac in the   closure.  The patient had the mesh then selected as Phasix ST.  This was   sutured in place with the Seprafilm side pointing down as an intraperitoneal   onlay using running 2-0 Stratafix suture.  The tension was adjusted in such a   fashion so that the mesh would lay flat when the pneumoperitoneum was   collapsed.  This was secured quite well circumferentially.  Once secured, the   patient had the robot undocked.  The needles used for suturing were retrieved.    Counts were correct.  The abdomen was carefully surveyed.  There was no   redundancy suture material or other needles left behind.  The procedure was   completed with desufflation of pneumoperitoneum and closure of the skin using   subcuticular 4-0 Monocryl.  The wounds were sealed with Dermabond.  A   compressive dressing was placed in the umbilicus to allow the skin just stick   down on the repair and held in place with a Tegaderm dressing.  The patient   was awakened, extubated and taken to the recovery room in stable and   satisfactory condition.  Estimated blood loss was minimal.  Sponge,   instrument, needle counts were reported as correct for all phases of surgery.    The operation was uncomplicated.  Patient was given a prescription for   Dilaudid in the office in compliance with [place] regulations.  She was asked   to return to see me in 1 week, provided there were no deviations from expected   recovery.  In the event the problems arise, she should call promptly.       ____________________________________     MD SHEREEN GRAMAJO / LEVON    DD:  12/03/2018 19:07:59  DT:  12/03/2018 19:39:19    D#:  [id number]  Job#:  [id number]    cc: Alirio Donald MD, Victorino Fitzpatrick MD

## 2019-07-27 ENCOUNTER — HOSPITAL ENCOUNTER (OUTPATIENT)
Dept: RADIOLOGY | Facility: MEDICAL CENTER | Age: 59
End: 2019-07-27
Attending: NURSE PRACTITIONER
Payer: COMMERCIAL

## 2019-07-27 DIAGNOSIS — M96.1 POSTLAMINECTOMY SYNDROME, CERVICAL REGION: ICD-10-CM

## 2019-08-13 ENCOUNTER — HOSPITAL ENCOUNTER (OUTPATIENT)
Dept: LAB | Facility: MEDICAL CENTER | Age: 59
End: 2019-08-13
Attending: OBSTETRICS & GYNECOLOGY
Payer: COMMERCIAL

## 2019-08-13 LAB
ALBUMIN SERPL BCP-MCNC: 4 G/DL (ref 3.2–4.9)
ALBUMIN/GLOB SERPL: 1.2 G/DL
ALP SERPL-CCNC: 51 U/L (ref 30–99)
ALT SERPL-CCNC: 19 U/L (ref 2–50)
ANION GAP SERPL CALC-SCNC: 9 MMOL/L (ref 0–11.9)
AST SERPL-CCNC: 21 U/L (ref 12–45)
BASOPHILS # BLD AUTO: 0.4 % (ref 0–1.8)
BASOPHILS # BLD: 0.04 K/UL (ref 0–0.12)
BILIRUB SERPL-MCNC: 0.7 MG/DL (ref 0.1–1.5)
BUN SERPL-MCNC: 17 MG/DL (ref 8–22)
CALCIUM SERPL-MCNC: 9 MG/DL (ref 8.5–10.5)
CHLORIDE SERPL-SCNC: 105 MMOL/L (ref 96–112)
CO2 SERPL-SCNC: 25 MMOL/L (ref 20–33)
CREAT SERPL-MCNC: 0.93 MG/DL (ref 0.5–1.4)
CRP SERPL HS-MCNC: 2.38 MG/DL (ref 0–0.75)
CRP SERPL HS-MCNC: 23.7 MG/L (ref 0–7.5)
EOSINOPHIL # BLD AUTO: 0.07 K/UL (ref 0–0.51)
EOSINOPHIL NFR BLD: 0.8 % (ref 0–6.9)
ERYTHROCYTE [DISTWIDTH] IN BLOOD BY AUTOMATED COUNT: 47.4 FL (ref 35.9–50)
ERYTHROCYTE [SEDIMENTATION RATE] IN BLOOD BY WESTERGREN METHOD: 19 MM/HOUR (ref 0–30)
GLOBULIN SER CALC-MCNC: 3.4 G/DL (ref 1.9–3.5)
GLUCOSE SERPL-MCNC: 90 MG/DL (ref 65–99)
HCT VFR BLD AUTO: 43.3 % (ref 37–47)
HGB BLD-MCNC: 13.4 G/DL (ref 12–16)
IMM GRANULOCYTES # BLD AUTO: 0.01 K/UL (ref 0–0.11)
IMM GRANULOCYTES NFR BLD AUTO: 0.1 % (ref 0–0.9)
LYMPHOCYTES # BLD AUTO: 3.38 K/UL (ref 1–4.8)
LYMPHOCYTES NFR BLD: 37.3 % (ref 22–41)
MCH RBC QN AUTO: 28.8 PG (ref 27–33)
MCHC RBC AUTO-ENTMCNC: 30.9 G/DL (ref 33.6–35)
MCV RBC AUTO: 93.1 FL (ref 81.4–97.8)
MONOCYTES # BLD AUTO: 0.64 K/UL (ref 0–0.85)
MONOCYTES NFR BLD AUTO: 7.1 % (ref 0–13.4)
NEUTROPHILS # BLD AUTO: 4.91 K/UL (ref 2–7.15)
NEUTROPHILS NFR BLD: 54.3 % (ref 44–72)
NRBC # BLD AUTO: 0 K/UL
NRBC BLD-RTO: 0 /100 WBC
PLATELET # BLD AUTO: 245 K/UL (ref 164–446)
PMV BLD AUTO: 10.7 FL (ref 9–12.9)
POTASSIUM SERPL-SCNC: 4.3 MMOL/L (ref 3.6–5.5)
PROT SERPL-MCNC: 7.4 G/DL (ref 6–8.2)
RBC # BLD AUTO: 4.65 M/UL (ref 4.2–5.4)
SODIUM SERPL-SCNC: 139 MMOL/L (ref 135–145)
TSH SERPL DL<=0.005 MIU/L-ACNC: 0.66 UIU/ML (ref 0.38–5.33)
URATE SERPL-MCNC: 6.6 MG/DL (ref 1.9–8.2)
WBC # BLD AUTO: 9.1 K/UL (ref 4.8–10.8)

## 2019-08-13 PROCEDURE — 84443 ASSAY THYROID STIM HORMONE: CPT

## 2019-08-13 PROCEDURE — 86039 ANTINUCLEAR ANTIBODIES (ANA): CPT

## 2019-08-13 PROCEDURE — 36415 COLL VENOUS BLD VENIPUNCTURE: CPT

## 2019-08-13 PROCEDURE — 85652 RBC SED RATE AUTOMATED: CPT

## 2019-08-13 PROCEDURE — 80053 COMPREHEN METABOLIC PANEL: CPT

## 2019-08-13 PROCEDURE — 86141 C-REACTIVE PROTEIN HS: CPT

## 2019-08-13 PROCEDURE — 86140 C-REACTIVE PROTEIN: CPT

## 2019-08-13 PROCEDURE — 84550 ASSAY OF BLOOD/URIC ACID: CPT

## 2019-08-13 PROCEDURE — 85025 COMPLETE CBC W/AUTO DIFF WBC: CPT

## 2019-08-13 PROCEDURE — 86038 ANTINUCLEAR ANTIBODIES: CPT

## 2019-08-15 LAB — NUCLEAR IGG SER QL IA: DETECTED

## 2019-08-16 LAB
ANA INTERPRETIVE COMMENT Q5143: ABNORMAL
ANA PATTERN Q5144: ABNORMAL
ANA TITER Q5145: ABNORMAL
ANTINUCLEAR ANTIBODY (ANA), HEP-2, IGG Q5142: DETECTED

## 2019-09-23 SDOH — HEALTH STABILITY: MENTAL HEALTH: HOW OFTEN DO YOU HAVE A DRINK CONTAINING ALCOHOL?: 2-3 TIMES A WEEK

## 2019-09-23 SDOH — HEALTH STABILITY: MENTAL HEALTH: HOW MANY STANDARD DRINKS CONTAINING ALCOHOL DO YOU HAVE ON A TYPICAL DAY?: 1 OR 2

## 2019-09-30 ENCOUNTER — ANESTHESIA EVENT (OUTPATIENT)
Dept: SURGERY | Facility: MEDICAL CENTER | Age: 59
End: 2019-09-30
Payer: COMMERCIAL

## 2019-09-30 ENCOUNTER — HOSPITAL ENCOUNTER (OUTPATIENT)
Facility: MEDICAL CENTER | Age: 59
End: 2019-09-30
Attending: SURGERY | Admitting: SURGERY
Payer: COMMERCIAL

## 2019-09-30 ENCOUNTER — ANESTHESIA (OUTPATIENT)
Dept: SURGERY | Facility: MEDICAL CENTER | Age: 59
End: 2019-09-30
Payer: COMMERCIAL

## 2019-09-30 VITALS
TEMPERATURE: 97.6 F | RESPIRATION RATE: 19 BRPM | BODY MASS INDEX: 36.85 KG/M2 | HEART RATE: 75 BPM | OXYGEN SATURATION: 95 % | WEIGHT: 215.83 LBS | SYSTOLIC BLOOD PRESSURE: 134 MMHG | DIASTOLIC BLOOD PRESSURE: 64 MMHG | HEIGHT: 64 IN

## 2019-09-30 LAB — EKG IMPRESSION: NORMAL

## 2019-09-30 PROCEDURE — 502714 HCHG ROBOTIC SURGERY SERVICES: Performed by: SURGERY

## 2019-09-30 PROCEDURE — 93005 ELECTROCARDIOGRAM TRACING: CPT | Performed by: SURGERY

## 2019-09-30 PROCEDURE — 700111 HCHG RX REV CODE 636 W/ 250 OVERRIDE (IP): Performed by: ANESTHESIOLOGY

## 2019-09-30 PROCEDURE — 501838 HCHG SUTURE GENERAL: Performed by: SURGERY

## 2019-09-30 PROCEDURE — 500064 HCHG BINDER, 4-PANEL MED/LG: Performed by: SURGERY

## 2019-09-30 PROCEDURE — 160035 HCHG PACU - 1ST 60 MINS PHASE I: Performed by: SURGERY

## 2019-09-30 PROCEDURE — 700102 HCHG RX REV CODE 250 W/ 637 OVERRIDE(OP): Performed by: ANESTHESIOLOGY

## 2019-09-30 PROCEDURE — 160002 HCHG RECOVERY MINUTES (STAT): Performed by: SURGERY

## 2019-09-30 PROCEDURE — 93010 ELECTROCARDIOGRAM REPORT: CPT | Performed by: INTERNAL MEDICINE

## 2019-09-30 PROCEDURE — 700105 HCHG RX REV CODE 258: Performed by: SURGERY

## 2019-09-30 PROCEDURE — 160009 HCHG ANES TIME/MIN: Performed by: SURGERY

## 2019-09-30 PROCEDURE — 160031 HCHG SURGERY MINUTES - 1ST 30 MINS LEVEL 5: Performed by: SURGERY

## 2019-09-30 PROCEDURE — 160048 HCHG OR STATISTICAL LEVEL 1-5: Performed by: SURGERY

## 2019-09-30 PROCEDURE — 502000 HCHG MISC OR IMPLANTS RC 0278: Performed by: SURGERY

## 2019-09-30 PROCEDURE — 160046 HCHG PACU - 1ST 60 MINS PHASE II: Performed by: SURGERY

## 2019-09-30 PROCEDURE — 160042 HCHG SURGERY MINUTES - EA ADDL 1 MIN LEVEL 5: Performed by: SURGERY

## 2019-09-30 PROCEDURE — 700101 HCHG RX REV CODE 250: Performed by: SURGERY

## 2019-09-30 PROCEDURE — 700111 HCHG RX REV CODE 636 W/ 250 OVERRIDE (IP)

## 2019-09-30 PROCEDURE — 160047 HCHG PACU  - EA ADDL 30 MINS PHASE II: Performed by: SURGERY

## 2019-09-30 PROCEDURE — 501570 HCHG TROCAR, SEPARATOR: Performed by: SURGERY

## 2019-09-30 PROCEDURE — A9270 NON-COVERED ITEM OR SERVICE: HCPCS | Performed by: ANESTHESIOLOGY

## 2019-09-30 PROCEDURE — 700101 HCHG RX REV CODE 250: Performed by: ANESTHESIOLOGY

## 2019-09-30 PROCEDURE — 500868 HCHG NEEDLE, SURGI(VARES): Performed by: SURGERY

## 2019-09-30 PROCEDURE — 160025 RECOVERY II MINUTES (STATS): Performed by: SURGERY

## 2019-09-30 PROCEDURE — 500002 HCHG ADHESIVE, DERMABOND: Performed by: SURGERY

## 2019-09-30 PROCEDURE — 160036 HCHG PACU - EA ADDL 30 MINS PHASE I: Performed by: SURGERY

## 2019-09-30 DEVICE — MESH SOFT 12 X 12: Type: IMPLANTABLE DEVICE | Status: FUNCTIONAL

## 2019-09-30 RX ORDER — ONDANSETRON 2 MG/ML
4 INJECTION INTRAMUSCULAR; INTRAVENOUS
Status: DISCONTINUED | OUTPATIENT
Start: 2019-09-30 | End: 2019-09-30 | Stop reason: HOSPADM

## 2019-09-30 RX ORDER — DEXAMETHASONE SODIUM PHOSPHATE 4 MG/ML
INJECTION, SOLUTION INTRA-ARTICULAR; INTRALESIONAL; INTRAMUSCULAR; INTRAVENOUS; SOFT TISSUE PRN
Status: DISCONTINUED | OUTPATIENT
Start: 2019-09-30 | End: 2019-09-30 | Stop reason: SURG

## 2019-09-30 RX ORDER — ONDANSETRON 2 MG/ML
INJECTION INTRAMUSCULAR; INTRAVENOUS PRN
Status: DISCONTINUED | OUTPATIENT
Start: 2019-09-30 | End: 2019-09-30 | Stop reason: SURG

## 2019-09-30 RX ORDER — DIPHENHYDRAMINE HYDROCHLORIDE 50 MG/ML
12.5 INJECTION INTRAMUSCULAR; INTRAVENOUS
Status: DISCONTINUED | OUTPATIENT
Start: 2019-09-30 | End: 2019-09-30

## 2019-09-30 RX ORDER — HYDROMORPHONE HYDROCHLORIDE 1 MG/ML
0.4 INJECTION, SOLUTION INTRAMUSCULAR; INTRAVENOUS; SUBCUTANEOUS
Status: DISCONTINUED | OUTPATIENT
Start: 2019-09-30 | End: 2019-09-30 | Stop reason: HOSPADM

## 2019-09-30 RX ORDER — CEFAZOLIN SODIUM 1 G/3ML
INJECTION, POWDER, FOR SOLUTION INTRAMUSCULAR; INTRAVENOUS PRN
Status: DISCONTINUED | OUTPATIENT
Start: 2019-09-30 | End: 2019-09-30 | Stop reason: SURG

## 2019-09-30 RX ORDER — HYDROMORPHONE HYDROCHLORIDE 1 MG/ML
0.2 INJECTION, SOLUTION INTRAMUSCULAR; INTRAVENOUS; SUBCUTANEOUS
Status: DISCONTINUED | OUTPATIENT
Start: 2019-09-30 | End: 2019-09-30 | Stop reason: HOSPADM

## 2019-09-30 RX ORDER — GABAPENTIN 300 MG/1
300 CAPSULE ORAL ONCE
Status: COMPLETED | OUTPATIENT
Start: 2019-09-30 | End: 2019-09-30

## 2019-09-30 RX ORDER — HYDROMORPHONE HYDROCHLORIDE 2 MG/ML
INJECTION, SOLUTION INTRAMUSCULAR; INTRAVENOUS; SUBCUTANEOUS PRN
Status: DISCONTINUED | OUTPATIENT
Start: 2019-09-30 | End: 2019-09-30 | Stop reason: SURG

## 2019-09-30 RX ORDER — OXYCODONE HCL 5 MG/5 ML
10 SOLUTION, ORAL ORAL
Status: COMPLETED | OUTPATIENT
Start: 2019-09-30 | End: 2019-09-30

## 2019-09-30 RX ORDER — HYDROMORPHONE HYDROCHLORIDE 1 MG/ML
0.1 INJECTION, SOLUTION INTRAMUSCULAR; INTRAVENOUS; SUBCUTANEOUS
Status: DISCONTINUED | OUTPATIENT
Start: 2019-09-30 | End: 2019-09-30 | Stop reason: HOSPADM

## 2019-09-30 RX ORDER — BUPIVACAINE HYDROCHLORIDE AND EPINEPHRINE 5; 5 MG/ML; UG/ML
INJECTION, SOLUTION EPIDURAL; INTRACAUDAL; PERINEURAL
Status: DISCONTINUED | OUTPATIENT
Start: 2019-09-30 | End: 2019-09-30 | Stop reason: HOSPADM

## 2019-09-30 RX ORDER — SODIUM CHLORIDE, SODIUM LACTATE, POTASSIUM CHLORIDE, CALCIUM CHLORIDE 600; 310; 30; 20 MG/100ML; MG/100ML; MG/100ML; MG/100ML
INJECTION, SOLUTION INTRAVENOUS CONTINUOUS
Status: DISCONTINUED | OUTPATIENT
Start: 2019-09-30 | End: 2019-09-30

## 2019-09-30 RX ORDER — ACETAMINOPHEN 500 MG
1000 TABLET ORAL ONCE
Status: DISCONTINUED | OUTPATIENT
Start: 2019-09-30 | End: 2019-09-30 | Stop reason: HOSPADM

## 2019-09-30 RX ORDER — SUCCINYLCHOLINE CHLORIDE 20 MG/ML
INJECTION INTRAMUSCULAR; INTRAVENOUS PRN
Status: DISCONTINUED | OUTPATIENT
Start: 2019-09-30 | End: 2019-09-30 | Stop reason: SURG

## 2019-09-30 RX ORDER — SODIUM CHLORIDE, SODIUM LACTATE, POTASSIUM CHLORIDE, CALCIUM CHLORIDE 600; 310; 30; 20 MG/100ML; MG/100ML; MG/100ML; MG/100ML
INJECTION, SOLUTION INTRAVENOUS CONTINUOUS
Status: DISCONTINUED | OUTPATIENT
Start: 2019-09-30 | End: 2019-09-30 | Stop reason: HOSPADM

## 2019-09-30 RX ORDER — ONDANSETRON 2 MG/ML
4 INJECTION INTRAMUSCULAR; INTRAVENOUS
Status: DISCONTINUED | OUTPATIENT
Start: 2019-09-30 | End: 2019-09-30

## 2019-09-30 RX ORDER — ROCURONIUM BROMIDE 10 MG/ML
INJECTION, SOLUTION INTRAVENOUS PRN
Status: DISCONTINUED | OUTPATIENT
Start: 2019-09-30 | End: 2019-09-30 | Stop reason: SURG

## 2019-09-30 RX ORDER — LIDOCAINE HYDROCHLORIDE 10 MG/ML
INJECTION, SOLUTION EPIDURAL; INFILTRATION; INTRACAUDAL; PERINEURAL
Status: COMPLETED
Start: 2019-09-30 | End: 2019-09-30

## 2019-09-30 RX ORDER — HALOPERIDOL 5 MG/ML
1 INJECTION INTRAMUSCULAR
Status: DISCONTINUED | OUTPATIENT
Start: 2019-09-30 | End: 2019-09-30 | Stop reason: HOSPADM

## 2019-09-30 RX ORDER — OXYCODONE HCL 5 MG/5 ML
5 SOLUTION, ORAL ORAL
Status: COMPLETED | OUTPATIENT
Start: 2019-09-30 | End: 2019-09-30

## 2019-09-30 RX ORDER — DIPHENHYDRAMINE HYDROCHLORIDE 50 MG/ML
12.5 INJECTION INTRAMUSCULAR; INTRAVENOUS
Status: DISCONTINUED | OUTPATIENT
Start: 2019-09-30 | End: 2019-09-30 | Stop reason: HOSPADM

## 2019-09-30 RX ORDER — HALOPERIDOL 5 MG/ML
1 INJECTION INTRAMUSCULAR
Status: DISCONTINUED | OUTPATIENT
Start: 2019-09-30 | End: 2019-09-30

## 2019-09-30 RX ADMIN — PROPOFOL 200 MG: 10 INJECTION, EMULSION INTRAVENOUS at 16:15

## 2019-09-30 RX ADMIN — SUCCINYLCHOLINE CHLORIDE 100 MG: 20 INJECTION, SOLUTION INTRAMUSCULAR; INTRAVENOUS at 16:15

## 2019-09-30 RX ADMIN — ROCURONIUM BROMIDE 50 MG: 10 INJECTION, SOLUTION INTRAVENOUS at 16:15

## 2019-09-30 RX ADMIN — DEXAMETHASONE SODIUM PHOSPHATE 4 MG: 4 INJECTION, SOLUTION INTRA-ARTICULAR; INTRALESIONAL; INTRAMUSCULAR; INTRAVENOUS; SOFT TISSUE at 16:15

## 2019-09-30 RX ADMIN — OXYCODONE HYDROCHLORIDE 10 MG: 5 SOLUTION ORAL at 17:34

## 2019-09-30 RX ADMIN — GABAPENTIN 300 MG: 300 CAPSULE ORAL at 15:30

## 2019-09-30 RX ADMIN — CEFAZOLIN 2 G: 330 INJECTION, POWDER, FOR SOLUTION INTRAMUSCULAR; INTRAVENOUS at 15:31

## 2019-09-30 RX ADMIN — MIDAZOLAM HYDROCHLORIDE 2 MG: 1 INJECTION, SOLUTION INTRAMUSCULAR; INTRAVENOUS at 15:31

## 2019-09-30 RX ADMIN — FENTANYL CITRATE 50 MCG: 50 INJECTION INTRAMUSCULAR; INTRAVENOUS at 17:48

## 2019-09-30 RX ADMIN — FENTANYL CITRATE 50 MCG: 50 INJECTION INTRAMUSCULAR; INTRAVENOUS at 17:55

## 2019-09-30 RX ADMIN — SUGAMMADEX 200 MG: 100 INJECTION, SOLUTION INTRAVENOUS at 17:14

## 2019-09-30 RX ADMIN — SODIUM CHLORIDE, POTASSIUM CHLORIDE, SODIUM LACTATE AND CALCIUM CHLORIDE: 600; 310; 30; 20 INJECTION, SOLUTION INTRAVENOUS at 14:20

## 2019-09-30 RX ADMIN — HYDROMORPHONE HYDROCHLORIDE 2 MG: 2 INJECTION, SOLUTION INTRAMUSCULAR; INTRAVENOUS; SUBCUTANEOUS at 16:15

## 2019-09-30 RX ADMIN — ONDANSETRON 4 MG: 2 INJECTION INTRAMUSCULAR; INTRAVENOUS at 16:15

## 2019-09-30 RX ADMIN — LIDOCAINE HYDROCHLORIDE 0.5 ML: 10 INJECTION, SOLUTION EPIDURAL; INFILTRATION; INTRACAUDAL at 14:20

## 2019-09-30 NOTE — ANESTHESIA PROCEDURE NOTES
Airway  Date/Time: 9/30/2019 4:16 PM  Performed by: Salvatore Wall M.D.  Authorized by: Salvatore Wall M.D.     Location:  OR  Urgency:  Elective  Indications for Airway Management:  Anesthesia  Spontaneous Ventilation: absent    Sedation Level:  Deep  Preoxygenated: Yes    Patient Position:  Sniffing  Final Airway Type:  Endotracheal airway  Final Endotracheal Airway:  ETT  Cuffed: Yes    Technique Used for Successful ETT Placement:  Direct laryngoscopy  Insertion Site:  Oral  Blade Type:  Juventino  Laryngoscope Blade/Videolaryngoscope Blade Size:  3  ETT Size (mm):  8.0  Measured from:  Teeth  ETT to Teeth (cm):  24  Placement Verified by: auscultation and capnometry    Cormack-Lehane Classification:  Grade I - full view of glottis  Number of Attempts at Approach:  1

## 2019-09-30 NOTE — ANESTHESIA PREPROCEDURE EVALUATION
Relevant Problems   ANESTHESIA   (+) Obstructive sleep apnea      PULMONARY   (+) SOB (shortness of breath)      CARDIAC   (+) Hypertension      ENDO   (+) Acquired hypothyroidism       Physical Exam    Airway   Mallampati: II  TM distance: >3 FB  Neck ROM: full       Cardiovascular - normal exam  Rhythm: regular  Rate: normal  (-) murmur     Dental - normal exam         Pulmonary - normal exam  Breath sounds clear to auscultation     Abdominal    Neurological - normal exam                 Anesthesia Plan    ASA 2       Plan - general       Airway plan will be ETT        Induction: intravenous    Postoperative Plan: Postoperative administration of opioids is intended.    Pertinent diagnostic labs and testing reviewed    Informed Consent:    Anesthetic plan and risks discussed with patient.    Use of blood products discussed with: patient whom consented to blood products.

## 2019-10-01 NOTE — OR SURGEON
Immediate Post OP Note    PreOp Diagnosis: rec rec VIH    PostOp Diagnosis: same    Procedure(s):  REPAIR, HERNIA, VENTRAL, ROBOT-ASSISTED, USING DA FREDIS XI- EXTENDED VIEW EXTRAPERITONEAL INCISIONAL HERNIA WITH MESH PLACEMENT - Wound Class: Clean    Surgeon(s):  BISHNU Rand M.D.    Anesthesiologist/Type of Anesthesia:  Anesthesiologist: Salvatore Wall M.D./General    Surgical Staff:  Circulator: Renata Clifton R.N.  Relief Circulator: Gaby Ramos R.N.  Relief Scrub: Ceira Parikh  Scrub Person: Renata Akins    Specimens removed if any:  * No specimens in log *    Estimated Blood Loss: 10    Findings: same    Complications: 0        9/30/2019 5:28 PM Jamie Randolph M.D.

## 2019-10-01 NOTE — OR NURSING
Arrived from PACU. Lap sites with dermabond to ABD Binder in place.  Oxygen level drops to 80s added 1 liter NC sats now 94-96.  IS given pt instructed in it's use.

## 2019-10-01 NOTE — OP REPORT
DATE OF SERVICE:  09/30/2019    PREOPERATIVE DIAGNOSIS:  Recurrent, recurrent ventral incisional hernia.    POSTOPERATIVE DIAGNOSIS:  Recurrent, recurrent ventral incisional hernia.    OPERATION PERFORMED:  Robotic-assisted laparoscopic extended view   extraperitoneal reduction and repair of recurrent, recurrent ventral   incisional hernia with Bard soft mesh reconstruction and bilateral rectus   abdominis myocutaneous flap advancement to the midline.    SURGEON:  Jamie Randolph MD    ANESTHESIOLOGIST:  Antony Hdez MD    ANESTHESIA:  General anesthesia.    ASSISTANT:  Lokesh Beal MD    OPERATIVE NOTE:  The patient, 59 years of age, presents with a recurrent,   recurrent ventral incisional hernia.  She underwent a laparoscopic   intraperitoneal onlay repair within the last year.  The patient now has   recurrence most likely lateral to the mesh.  It is increasingly symptomatic.    She was felt to be a candidate for repair at this time.  We offered a   robotic-assisted laparoscopic extended view extraperitoneal repair.  The   risks, possible complications of that were discussed with her in detail and   she consented to proceed.  The patient had a satisfactory preoperative   evaluation, had signed consents for surgery and anesthesia, was taken to the   operating room and placed under anesthesia by Dr. Hdez.  Once anesthetized,   her abdomen was prepped with ChloraPrep solution, sterile drapes were applied   and a timeout was affected.  A solution of 0.5% Marcaine with epinephrine was   infiltrated into all wounds.  An incision was made in the left upper quadrant   over the rectus sheath.  A Visiport was advanced down through the   subcutaneous tissues through the anterior rectus sheath and muscle into the   retrorectus space.  A space was developed here using pneumo insufflation and   blunt dissection.  This facilitated placement along the lateral border of the   rectus sheath of an 8 mm robotic trocar in  the mid abdomen and one in the left   lower quadrant.  The patient then had the 5 mm trocar exchanged for an 8 mm   trocar.  The da Renny robot was brought in and docked.  Instrumentation and   camera introduced.  Dr. Randolph retired to the console.  The posterior rectus   sheath was then developed using sharp dissection with electrocautery from   lateral to medial along the left rectus.  This was developed down to the   midline along the linea alba superiorly and down to the hernia.  Here, we   found some fixation of the fascia to the previous mesh.  This was tightly   scarred.  This was taken down using sharp dissection up to the hernia sac.    The inferior aspect of the rectus sheath was mobilized down along the   semicircular line of Tamir and developed across the midline.  The patient   had the hernia sac then gradually grasped and reduced.  This revealed the   recurrent hernia lateral to the previous repair and slightly inferior to it.    This was widely mobilized.  The patient had the linea alba taken down along   the midline on the left rectus.  The preperitoneal space was entered and a   preperitoneal dissection was carried across the midline down to the medial   border of the right rectus muscle, which was incised.  This was then incised   to the length of the incision, which was also lateral to the hernia defect.    The patient had the retrorectus space then developed on the right down to the   perforators to the extent of the incision.  Once this space was freely   mobilized, the patient had the midline closed including the hernia defect   using running 2-0 Stratafix sutures.  This was closed along the entire length   of the incision and then tied securely in the midline.  This was done under   somewhat lower insufflation pressure.  Once this had the advancement into the   myocutaneous flaps in the midline for closure, the space was measured.  An   18x24 cm Bard soft mesh was then measured to size to fit  the cavity.  This was   shaved and then inserted into the abdominal cavity and unfurled in the   posterior space.  The suture needles and measuring tape had been removed at   this point.  Once the mesh was lying flat against the posterior rectus sheath,   the pneumoinsufflation was released.  The space slowly closed.  Marcaine was   puddled in the space.  The patient had the trocars removed.  The robot had   been undocked.  The skin was closed using interrupted and running 4-0 Monocryl   subcuticular sutures and the wounds were sealed with Dermabond.  The patient   was placed in an abdominal binder.  She tolerated the procedure well.  There   was minimal blood loss and no intraoperative complications.  As anticipated,   the patient will be treated on an ambulatory basis.  Followup is arranged with   her as an outpatient and detailed postoperative care instructions were given   to her.       ____________________________________     MD SHEREEN GRAMAJO / LEVON    DD:  09/30/2019 17:35:06  DT:  09/30/2019 17:55:00    D#:  [id number]  Job#:  [id number]    cc: Antony Hdez MD

## 2019-10-01 NOTE — ANESTHESIA TIME REPORT
Anesthesia Start and Stop Event Times     Date Time Event    9/30/2019 1530 Ready for Procedure     1531 Anesthesia Start     1747 Anesthesia Stop        Responsible Staff  09/30/19    Name Role Begin End    Salvatore Wall M.D. Anesth 1531 1747        Preop Diagnosis (Free Text):  Pre-op Diagnosis     INCISIONAL HERNIA        Preop Diagnosis (Codes):    Post op Diagnosis  Ventral hernia      Premium Reason  B. 1st Call    Comments:

## 2019-10-01 NOTE — OR NURSING
Patient discharged home with family. Instructions given to family and patient. No questions or concerns at this time.

## 2019-10-01 NOTE — DISCHARGE INSTRUCTIONS
ACTIVITY: Rest and take it easy for the first 24 hours.  A responsible adult is recommended to remain with you during that time.  It is normal to feel sleepy.  We encourage you to not do anything that requires balance, judgment or coordination.    MILD FLU-LIKE SYMPTOMS ARE NORMAL. YOU MAY EXPERIENCE GENERALIZED MUSCLE ACHES, THROAT IRRITATION, HEADACHE AND/OR SOME NAUSEA.    FOR 24 HOURS DO NOT:  Drive, operate machinery or run household appliances.  Drink beer or alcoholic beverages.   Make important decisions or sign legal documents.    SPECIAL INSTRUCTIONS: Please follow all MD instructions.     DIET: To avoid nausea, slowly advance diet as tolerated, avoiding spicy or greasy foods for the first day.  Add more substantial food to your diet according to your physician's instructions. INCREASE FLUIDS AND FIBER TO AVOID CONSTIPATION.    SURGICAL DRESSING/BATHING: Showering next day is ok. Please keep dressing clean and dry.     FOLLOW-UP APPOINTMENT:  A follow-up appointment should be arranged with your doctor in 1-2 weeks ; call to schedule.    You should CALL YOUR PHYSICIAN if you develop:  Fever greater than 101 degrees F.  Pain not relieved by medication, or persistent nausea or vomiting.  Excessive bleeding (blood soaking through dressing) or unexpected drainage from the wound.  Extreme redness or swelling around the incision site, drainage of pus or foul smelling drainage.  Inability to urinate or empty your bladder within 8 hours.  Problems with breathing or chest pain.    You should call 911 if you develop problems with breathing or chest pain.  If you are unable to contact your doctor or surgical center, you should go to the nearest emergency room or urgent care center. Dr Physician's telephone #: [phone]    If any questions arise, call your doctor.  If your doctor is not available, please feel free to call the Surgical Center at [phone].  The Center is open Monday through Friday from 7AM to  7PM.  You can also call the HEALTH HOTLINE open 24 hours/day, 7 days/week and speak to a nurse at [phone], or toll free at [phone].    A registered nurse may call you a few days after your surgery to see how you are doing after your procedure.    MEDICATIONS: Resume taking daily medication.  Take prescribed pain medication with food.  If no medication is prescribed, you may take non-aspirin pain medication if needed.  PAIN MEDICATION CAN BE VERY CONSTIPATING.  Take a stool softener or laxative such as senokot, pericolace, or milk of magnesia if needed.    Prescriptions given prior to surgery.  Last pain medication given at 5:34 pm. Please see prescription for details.     If your physician has prescribed pain medication that includes Acetaminophen (Tylenol), do not take additional Acetaminophen (Tylenol) while taking the prescribed medication.    Depression / Suicide Risk    As you are discharged from this [facility] facility, it is important to learn how to keep safe from harming yourself.    Recognize the warning signs:  · Abrupt changes in personality, positive or negative- including increase in energy   · Giving away possessions  · Change in eating patterns- significant weight changes-  positive or negative  · Change in sleeping patterns- unable to sleep or sleeping all the time   · Unwillingness or inability to communicate  · Depression  · Unusual sadness, discouragement and loneliness  · Talk of wanting to die  · Neglect of personal appearance   · Rebelliousness- reckless behavior  · Withdrawal from people/activities they love  · Confusion- inability to concentrate     If you or a loved one observes any of these behaviors or has concerns about self-harm, here's what you can do:  · Talk about it- your feelings and reasons for harming yourself  · Remove any means that you might use to hurt yourself (examples: pills, rope, extension cords, firearm)  · Get professional help from the community  (Mental Health, Substance Abuse, psychological counseling)  · Do not be alone:Call your Safe Contact- someone whom you trust who will be there for you.  · Call your local CRISIS HOTLINE 961-4016 or 383-056-3885  · Call your local Children's Mobile Crisis Response Team [place] (838) 857-8104 or www.[employer]  · Call the toll free National Suicide Prevention Hotlines   · National Suicide Prevention Lifeline 598-720-GMZC (7606)  · National Hope Line Network 800-SUICIDE (395-7882)

## 2019-10-01 NOTE — ANESTHESIA POSTPROCEDURE EVALUATION
Patient: Senia Valle    Procedure Summary     Date:  09/30/19 Room / Location:  [facility] 11 / SURGERY [facility]    Anesthesia Start:  1531 Anesthesia Stop:  1731    Procedure:  REPAIR, HERNIA, VENTRAL, ROBOT-ASSISTED, USING DA FREDIS XI- EXTENDED VIEW EXTRAPERITONEAL INCISIONAL HERNIA WITH MESH PLACEMENT (Abdomen) Diagnosis:  (INCISIONAL HERNIA)    Surgeon:  Jamie Randolph M.D. Responsible Provider:  Salvatore Wall M.D.    Anesthesia Type:  general ASA Status:  2          Final Anesthesia Type: general  Last vitals  BP   NIBP: 146/85    Temp   36.6 °C (97.9 °F)    Pulse   Pulse: 76   Resp   17    SpO2   93 %      Anesthesia Post Evaluation    Patient location during evaluation: PACU  Patient participation: complete - patient participated  Level of consciousness: awake and alert    Airway patency: patent  Anesthetic complications: no  Cardiovascular status: hemodynamically stable  Respiratory status: acceptable  Hydration status: euvolemic    PONV: none           Nurse Pain Score: 3 (NPRS)

## 2020-05-05 ENCOUNTER — OFFICE VISIT (OUTPATIENT)
Dept: CARDIOLOGY | Facility: MEDICAL CENTER | Age: 60
End: 2020-05-05
Payer: COMMERCIAL

## 2020-05-05 VITALS
HEIGHT: 64 IN | OXYGEN SATURATION: 96 % | DIASTOLIC BLOOD PRESSURE: 74 MMHG | HEART RATE: 76 BPM | BODY MASS INDEX: 24.62 KG/M2 | WEIGHT: 144.2 LBS | RESPIRATION RATE: 16 BRPM | SYSTOLIC BLOOD PRESSURE: 112 MMHG

## 2020-05-05 DIAGNOSIS — R07.89 CHEST PAIN, ATYPICAL: ICD-10-CM

## 2020-05-05 DIAGNOSIS — R00.2 PALPITATIONS: ICD-10-CM

## 2020-05-05 LAB — EKG IMPRESSION: NORMAL

## 2020-05-05 PROCEDURE — 93000 ELECTROCARDIOGRAM COMPLETE: CPT | Performed by: INTERNAL MEDICINE

## 2020-05-05 PROCEDURE — 99214 OFFICE O/P EST MOD 30 MIN: CPT | Performed by: INTERNAL MEDICINE

## 2020-05-05 ASSESSMENT — ENCOUNTER SYMPTOMS
DIZZINESS: 0
LOSS OF CONSCIOUSNESS: 0
PALPITATIONS: 1

## 2020-05-05 ASSESSMENT — FIBROSIS 4 INDEX: FIB4 SCORE: 1.14

## 2020-05-05 NOTE — PROGRESS NOTES
"Subjective:   Senia Valle is a 57 y.o. female who presents today for evaluation of palpitations with a history of chest pain and hypertension.    Last seen 2017.    Since 2017 the patient has done well from a cardiac standpoint.  She suffers from significant myofascial pain related to her previous neck fusion and back surgery.  Her regular treatment for this however is been interrupted with the COVID-19 the situation.  Nonetheless she and her  have been walking on a daily basis up to 1 to 2 hours without any difficulty until yesterday and this morning when she noted some palpitations described as flutters that were particularly noticeable and bothersome this morning.  No syncope.    Overall the patient has been doing well from a cardiac standpoint.  Blood pressures been stable.  Still occasionally has some chest pain which undoubtedly has been related to her cervical disc disease for which she's had 2 previous surgeries.  No other cardiac symptoms.    Past Medical History:   Diagnosis Date   • Anxiety disorder 05/2018    not an issue at this time   • Back pain    • Bowel habit changes     diarrhea   • Bruxism    • Chest pain, atypical 8/26/2013   • Degeneration of cervical intervertebral disc    • Dizziness    • Fibromyalgia    • Fibromyalgia muscle pain 8/26/2013   • Heart burn    • High cholesterol    • Hypertension    • Hypertensive cardiovascular disease 12/30/2011   • Hypothyroidism    • Lumbosacral (joint) (ligament) sprain    • Lumbosacral (joint) (ligament) sprain    • Obesity    • Pain 05/2018    all over   • Sleep apnea     \"border line apnea\" uses CPAP    • SOB (shortness of breath) 8/26/2013    panic attacks     Past Surgical History:   Procedure Laterality Date   • VENTRAL HERNIA REPAIR ROBOTIC XI  9/30/2019    Procedure: REPAIR, HERNIA, VENTRAL, ROBOT-ASSISTED, USING DA FREDIS XI- EXTENDED VIEW EXTRAPERITONEAL INCISIONAL HERNIA WITH MESH PLACEMENT;  Surgeon: Jamie Randolph M.D.;  " "Location: SURGERY [facility];  Service: General   • VENTRAL HERNIA REPAIR ROBOTIC XI  12/3/2018    Procedure: VENTRAL HERNIA REPAIR ROBOTIC XI - INCISIONAL W/POSS MESH PLACEMENT;  Surgeon: Jamie Randolph M.D.;  Location: SURGERY [facility];  Service: General   • HIP ARTH ANTERIOR TOTAL Left 2018    Procedure: HIP ARTHROPLASTY ANTERIOR TOTAL;  Surgeon: Valdez Ndiaye M.D.;  Location: SURGERY [facility];  Service: Orthopedics   • HIP REVISION TOTAL      lt   • CHOLECYSTECTOMY     • GYN SURGERY      ablation    • OTHER NEUROLOGICAL SURG      C spine surgeries X2   • OTHER NEUROLOGICAL SURG      lower back X4   • OTHER NEUROLOGICAL SURG      lumbar hardware removal    • OTHER ORTHOPEDIC SURGERY      bilateral hip arthroscopy   • OTHER SURGICAL PROCEDURE  cervical vertebral fusion   • OTHER SURGICAL PROCEDURE  hip repair   • OTHER SURGICAL PROCEDURE  lamenectomy decompress   • TONSILLECTOMY       Family History   Problem Relation Age of Onset   • Alcohol abuse Father    • No Known Problems Mother    • Cancer Maternal Grandmother 94        nonhodgkins lymphoma     Social History     Tobacco Use   Smoking Status Former Smoker   • Packs/day: 1.00   • Years: 10.00   • Pack years: 10.00   • Types: Cigarettes   • Last attempt to quit: 1988   • Years since quittin.3   Smokeless Tobacco Never Used     Allergies   Allergen Reactions   • Banana Anaphylaxis   • Demerol Itching     itching   • Diazepam Anxiety   • Flexeril [Cyclobenzaprine Hcl] Unspecified     \"Makes me crazy\"   • Nsaids Diarrhea     GI pain   • Percocet [Oxycodone-Acetaminophen] Itching     Teeth grinding   • Vicodin [Hydrocodone-Acetaminophen] Itching     Low dose OK (if takes more than 1/2 a pill)   • Eggs Diarrhea     Severe diarrhea   • Gabapentin      \"makes me exhausted\"   • Gluten Meal      Severe diarrhea   • Ultram [Tramadol]      \"stopped it many years ago for a reason but cannot remember why\"     Outpatient Encounter " "Medications as of 5/5/2020   Medication Sig Dispense Refill   • NON SPECIFIED Take 0.25 mL by mouth every day. Biest progesterone compounded drops from [facility]     • Cholecalciferol (VITAMIN D PO) Take 10,000 Units by mouth every day. Pt takes liquid     • Melatonin 1 MG Tab Take 1 Tab by mouth every bedtime.     • MAGNESIUM PO Take 2 Tabs by mouth 2 Times a Day.     • B Complex Vitamins (VITAMIN-B COMPLEX PO) Take 1 Tab by mouth every day.     • Ascorbic Acid (VITAMIN C PO) Take 1 Tab by mouth every day.     • metoprolol SR (TOPROL XL) 50 MG TABLET SR 24 HR Take 50 mg by mouth every morning.     • progesterone (PROMETRIUM) 200 MG capsule Take 200 mg by mouth every evening.     • thyroid (ARMOUR THYROID) 60 MG TABS Take 60 mg by mouth every morning.       No facility-administered encounter medications on file as of 5/5/2020.      Review of Systems   Cardiovascular: Positive for chest pain and palpitations.   Neurological: Negative for dizziness and loss of consciousness.        Objective:   /74 (BP Location: Left arm, Patient Position: Sitting, BP Cuff Size: Adult)   Pulse 76   Resp 16   Ht 1.626 m (5' 4\")   Wt 65.4 kg (144 lb 3.2 oz)   LMP  (LMP Unknown)   SpO2 96%   BMI 24.75 kg/m²     Physical Exam   Constitutional: She is oriented to person, place, and time. She appears well-nourished. No distress.   HENT:   Head: Normocephalic.   Eyes: EOM are normal. No scleral icterus.   Neck: Carotid bruit is not present.   Normal jugular venous pressure.  Healed neck scar.   Cardiovascular: Normal rate, regular rhythm, S1 normal, S2 normal and normal heart sounds. Exam reveals no gallop and no friction rub.   No murmur heard.  Pulses:       Carotid pulses are 2+ on the right side and 2+ on the left side.       Radial pulses are 2+ on the right side and 2+ on the left side.   Pulmonary/Chest: Effort normal and breath sounds normal. She has no wheezes. She has no rhonchi. She has no rales. "   Musculoskeletal:         General: No edema.   Neurological: She is alert and oriented to person, place, and time.   Skin: Skin is warm and dry.   Psychiatric: She has a normal mood and affect. Her behavior is normal.     ECHOCARDIOGRAM 05/07/2017  Left ventricular ejection fraction 70%.    MPI 2018  No evidence of significant jeopardized viable myocardium or prior myocardial    infarction.   Normal left ventricular size, ejection fraction, and wall motion.   ECG INTERPRETATION   Negative stress ECG for ischemia.    EKG 5/5/2020 normal sinus rhythm, rate 65.  Personally reviewed and interpreted.    Assessment:     1. Chest pain, atypical  LIPID PANEL    Sed Rate    CRP HIGH SENSITIVE (CARDIAC)   2. Palpitations  Holter Monitor / Event Recorder       Medical Decision Making:  Today's Assessment / Status / Plan:     Assessment  1.  Palpitations.  2.  Chest pain.  Probably nonischemic.  3.  Hypertension. Controlled.    Recommendation Discussion  1.  EKG is unremarkable and unchanged since 9/30/2019.  2.  We will obtain a cardiac event recorder to assess her palpitations and to rule out arrhythmia.  3.  We will update lipid panel and recheck her CRP and sed rate.  4.  Follow-up 1 month.

## 2020-05-11 ENCOUNTER — TELEPHONE (OUTPATIENT)
Dept: CARDIOLOGY | Facility: MEDICAL CENTER | Age: 60
End: 2020-05-11

## 2020-05-11 ENCOUNTER — NON-PROVIDER VISIT (OUTPATIENT)
Dept: CARDIOLOGY | Facility: MEDICAL CENTER | Age: 60
End: 2020-05-11
Payer: COMMERCIAL

## 2020-05-11 DIAGNOSIS — R00.2 PALPITATIONS: ICD-10-CM

## 2020-05-11 DIAGNOSIS — I49.1 PAC (PREMATURE ATRIAL CONTRACTION): ICD-10-CM

## 2020-05-11 PROCEDURE — 93268 ECG RECORD/REVIEW: CPT | Performed by: INTERNAL MEDICINE

## 2020-05-11 NOTE — TELEPHONE ENCOUNTER
Patient called with concerns about appointment for placement of holter monitor.   Patient with recent hip surgery and having complications with pain.     Explained holter monitor can be mailed out.     Flory STEVENS notified to mail [employer] out.  Patient will NOT be physically coming in.

## 2020-05-11 NOTE — TELEPHONE ENCOUNTER
Patient enrolled in the 14 day [employer] Heart monitoring program per Dr King.  Monitor to be shipped to patient by [employer].  >Pending Baseline.  >Pending EOS.

## 2020-05-20 DIAGNOSIS — R00.2 PALPITATIONS: ICD-10-CM

## 2020-05-20 NOTE — PROGRESS NOTES
I was called by the patient and informed they were told that event recorder was not approved  Have reordered it  Please follow up to insure that it is sent

## 2020-05-21 NOTE — PROGRESS NOTES
Attempted to reach patient.  Chart has no records of event recorder being denied.  Called patient to find out more details.  Requested a return phone call.  -------------------------------------------------------------------------------------------------------------------  9:52 AM = Patient returned phone call.  Patient states that there was a typo with her insurance information.  Was advised that the order would be re-ran with the correction.  Currently waiting for monitor.  Advised patient that Flory hawkins would be consulted for a status update.  Advised that I would call back if more information was needed.    Patient verbalized understanding.  -------------------------------------------------------------------------------------------------------------------  10:41 AM = Spoke with Flory who followed up with Janes.  Janes has the correct information on their end and the monitor is in their queue.  Called patient to update and reassure that monitor will be on its way.  Patient verbalized understanding and was appreciative for the update.

## 2020-06-11 ENCOUNTER — TELEPHONE (OUTPATIENT)
Dept: CARDIOLOGY | Facility: MEDICAL CENTER | Age: 60
End: 2020-06-11

## 2020-06-11 DIAGNOSIS — R00.2 PALPITATIONS: ICD-10-CM

## 2020-10-21 ENCOUNTER — HOSPITAL ENCOUNTER (OUTPATIENT)
Dept: RADIOLOGY | Facility: MEDICAL CENTER | Age: 60
End: 2020-10-21
Attending: OBSTETRICS & GYNECOLOGY
Payer: COMMERCIAL

## 2020-10-21 DIAGNOSIS — Z12.31 VISIT FOR SCREENING MAMMOGRAM: ICD-10-CM

## 2020-10-23 ENCOUNTER — HOSPITAL ENCOUNTER (OUTPATIENT)
Dept: RADIOLOGY | Facility: MEDICAL CENTER | Age: 60
End: 2020-10-23
Attending: OBSTETRICS & GYNECOLOGY
Payer: COMMERCIAL

## 2020-10-23 DIAGNOSIS — Z12.31 VISIT FOR SCREENING MAMMOGRAM: ICD-10-CM

## 2020-10-23 PROCEDURE — G0279 TOMOSYNTHESIS, MAMMO: HCPCS

## 2021-03-15 DIAGNOSIS — Z23 NEED FOR VACCINATION: ICD-10-CM

## 2021-03-31 ENCOUNTER — HOSPITAL ENCOUNTER (OUTPATIENT)
Dept: LAB | Facility: MEDICAL CENTER | Age: 61
End: 2021-03-31
Attending: OBSTETRICS & GYNECOLOGY
Payer: COMMERCIAL

## 2021-03-31 LAB
ALBUMIN SERPL BCP-MCNC: 4.2 G/DL (ref 3.2–4.9)
ALBUMIN/GLOB SERPL: 1.4 G/DL
ALP SERPL-CCNC: 51 U/L (ref 30–99)
ALT SERPL-CCNC: 20 U/L (ref 2–50)
ANION GAP SERPL CALC-SCNC: 8 MMOL/L (ref 7–16)
AST SERPL-CCNC: 19 U/L (ref 12–45)
BASOPHILS # BLD AUTO: 0.4 % (ref 0–1.8)
BASOPHILS # BLD: 0.03 K/UL (ref 0–0.12)
BILIRUB SERPL-MCNC: 0.5 MG/DL (ref 0.1–1.5)
BUN SERPL-MCNC: 11 MG/DL (ref 8–22)
CALCIUM SERPL-MCNC: 9.7 MG/DL (ref 8.5–10.5)
CHLORIDE SERPL-SCNC: 105 MMOL/L (ref 96–112)
CO2 SERPL-SCNC: 29 MMOL/L (ref 20–33)
CREAT SERPL-MCNC: 0.71 MG/DL (ref 0.5–1.4)
CRP SERPL HS-MCNC: 12.3 MG/L (ref 0–7.5)
EOSINOPHIL # BLD AUTO: 0.12 K/UL (ref 0–0.51)
EOSINOPHIL NFR BLD: 1.5 % (ref 0–6.9)
ERYTHROCYTE [DISTWIDTH] IN BLOOD BY AUTOMATED COUNT: 45.2 FL (ref 35.9–50)
EST. AVERAGE GLUCOSE BLD GHB EST-MCNC: 137 MG/DL
GLOBULIN SER CALC-MCNC: 2.9 G/DL (ref 1.9–3.5)
GLUCOSE SERPL-MCNC: 101 MG/DL (ref 65–99)
HBA1C MFR BLD: 6.4 % (ref 4–5.6)
HCT VFR BLD AUTO: 43.2 % (ref 37–47)
HCYS SERPL-SCNC: 6.98 UMOL/L
HGB BLD-MCNC: 13.4 G/DL (ref 12–16)
IMM GRANULOCYTES # BLD AUTO: 0.02 K/UL (ref 0–0.11)
IMM GRANULOCYTES NFR BLD AUTO: 0.3 % (ref 0–0.9)
LYMPHOCYTES # BLD AUTO: 3.26 K/UL (ref 1–4.8)
LYMPHOCYTES NFR BLD: 41.4 % (ref 22–41)
MCH RBC QN AUTO: 29.5 PG (ref 27–33)
MCHC RBC AUTO-ENTMCNC: 31 G/DL (ref 33.6–35)
MCV RBC AUTO: 95.2 FL (ref 81.4–97.8)
MONOCYTES # BLD AUTO: 0.62 K/UL (ref 0–0.85)
MONOCYTES NFR BLD AUTO: 7.9 % (ref 0–13.4)
NEUTROPHILS # BLD AUTO: 3.83 K/UL (ref 2–7.15)
NEUTROPHILS NFR BLD: 48.5 % (ref 44–72)
NRBC # BLD AUTO: 0 K/UL
NRBC BLD-RTO: 0 /100 WBC
PLATELET # BLD AUTO: 241 K/UL (ref 164–446)
PMV BLD AUTO: 10.8 FL (ref 9–12.9)
POTASSIUM SERPL-SCNC: 4.4 MMOL/L (ref 3.6–5.5)
PROT SERPL-MCNC: 7.1 G/DL (ref 6–8.2)
RBC # BLD AUTO: 4.54 M/UL (ref 4.2–5.4)
SODIUM SERPL-SCNC: 142 MMOL/L (ref 135–145)
WBC # BLD AUTO: 7.9 K/UL (ref 4.8–10.8)

## 2021-03-31 PROCEDURE — 83001 ASSAY OF GONADOTROPIN (FSH): CPT

## 2021-03-31 PROCEDURE — 84403 ASSAY OF TOTAL TESTOSTERONE: CPT

## 2021-03-31 PROCEDURE — 84439 ASSAY OF FREE THYROXINE: CPT

## 2021-03-31 PROCEDURE — 84402 ASSAY OF FREE TESTOSTERONE: CPT

## 2021-03-31 PROCEDURE — 84270 ASSAY OF SEX HORMONE GLOBUL: CPT

## 2021-03-31 PROCEDURE — 86141 C-REACTIVE PROTEIN HS: CPT

## 2021-03-31 PROCEDURE — 84443 ASSAY THYROID STIM HORMONE: CPT

## 2021-03-31 PROCEDURE — 85025 COMPLETE CBC W/AUTO DIFF WBC: CPT

## 2021-03-31 PROCEDURE — 84481 FREE ASSAY (FT-3): CPT

## 2021-03-31 PROCEDURE — 82670 ASSAY OF TOTAL ESTRADIOL: CPT

## 2021-03-31 PROCEDURE — 83036 HEMOGLOBIN GLYCOSYLATED A1C: CPT

## 2021-03-31 PROCEDURE — 80053 COMPREHEN METABOLIC PANEL: CPT

## 2021-03-31 PROCEDURE — 36415 COLL VENOUS BLD VENIPUNCTURE: CPT

## 2021-03-31 PROCEDURE — 82306 VITAMIN D 25 HYDROXY: CPT

## 2021-03-31 PROCEDURE — 83090 ASSAY OF HOMOCYSTEINE: CPT

## 2021-04-01 LAB
ESTRADIOL SERPL-MCNC: 34.4 PG/ML
FSH SERPL-ACNC: 15.1 MIU/ML
T3FREE SERPL-MCNC: 2.72 PG/ML (ref 2–4.4)
T4 FREE SERPL-MCNC: 0.83 NG/DL (ref 0.93–1.7)
TSH SERPL DL<=0.005 MIU/L-ACNC: 2 UIU/ML (ref 0.38–5.33)

## 2021-04-02 LAB — 25(OH)D3 SERPL-MCNC: 42 NG/ML (ref 30–80)

## 2021-04-05 LAB
SHBG SERPL-SCNC: 74 NMOL/L (ref 30–135)
TESTOST FREE SERPL-MCNC: 1.3 PG/ML (ref 0.6–3.8)
TESTOST SERPL-MCNC: 13 NG/DL (ref 9–55)

## 2021-08-02 ENCOUNTER — SLEEP CENTER VISIT (OUTPATIENT)
Dept: SLEEP MEDICINE | Facility: MEDICAL CENTER | Age: 61
End: 2021-08-02
Payer: COMMERCIAL

## 2021-08-02 VITALS
RESPIRATION RATE: 16 BRPM | HEIGHT: 64 IN | SYSTOLIC BLOOD PRESSURE: 110 MMHG | HEART RATE: 80 BPM | BODY MASS INDEX: 35.85 KG/M2 | DIASTOLIC BLOOD PRESSURE: 78 MMHG | WEIGHT: 210 LBS | OXYGEN SATURATION: 92 %

## 2021-08-02 DIAGNOSIS — I10 HYPERTENSION, UNSPECIFIED TYPE: ICD-10-CM

## 2021-08-02 DIAGNOSIS — R63.8 INCREASED BMI: ICD-10-CM

## 2021-08-02 DIAGNOSIS — E03.9 ACQUIRED HYPOTHYROIDISM: ICD-10-CM

## 2021-08-02 DIAGNOSIS — G47.33 OBSTRUCTIVE SLEEP APNEA: ICD-10-CM

## 2021-08-02 DIAGNOSIS — Z87.891 FORMER SMOKER: ICD-10-CM

## 2021-08-02 PROCEDURE — 99203 OFFICE O/P NEW LOW 30 MIN: CPT | Performed by: INTERNAL MEDICINE

## 2021-08-02 ASSESSMENT — FIBROSIS 4 INDEX: FIB4 SCORE: 1.04

## 2021-08-02 NOTE — PROGRESS NOTES
CC: Reestablish care for KATE on CPAP    HPI:  Senia Valle is a 60 y.o. female kindly referred by her  Dr. Salvatore Valle MD to reestablish care for KATE on CPAP.    Her 5/9/2016 sleep study showed an AHI of 14.4 with a maxwell saturation of 79%.  She had poor sleep efficiency and consequently did not meet the split night protocol.  She was empirically placed on auto titrating CPAP with room air.  A subsequent overnight oximetry on Pap revealed mild cyclic hypoxemia with saturations less than 88% for 91 minutes.    The patient reports improved symptoms using positive airway pressure.  Has experienced no significant problems with mask fit, mask leak, pressure tolerance, excessive airway dryness, nasal congestion, aerophagia, or chest pain.    She generally goes to bed at 9:30 PM and gets up at 7 AM.  She estimates that her sleep latency is about 10 minutes.  She may watch TV just prior to turning out the lights and attempting to go to sleep.  She reports 1 episode of nocturia.  Current symptoms include too little sleep at night and difficulty awakening and getting out of bed after sleeping.  She believes that pain is responsible for her difficulty sleeping.  She may snore or experience shortness of breath only when lying on her back but not since she started using CPAP.  She has had perhaps 2 episodes of night terrors in her life.    Significant comorbidities and modifying factors include former smoker, obesity, acquired hypothyroidism, hypertension, dyslipidemia, back pain, prior neck and back surgery, and prediabetes.      Patient Active Problem List    Diagnosis Date Noted   • Increased BMI 08/02/2021   • Former smoker 08/02/2021   • Palpitations 05/05/2020   • Acquired hypothyroidism 08/30/2018   • Post menopausal syndrome 08/30/2018   • Elevated d-dimer 08/30/2018   • Left knee pain 05/27/2018   • Leukocytosis 05/27/2018   • S/P hip replacement 05/25/2018   • Obstructive sleep apnea 05/16/2016  "  • Muscle spasm of back 02/02/2016   • Anxiety 01/06/2016   • Chest pain, atypical 08/26/2013   • SOB (shortness of breath) 08/26/2013   • Fibromyalgia muscle pain 08/26/2013   • Snoring 08/26/2013   • Hypertensive cardiovascular disease 12/30/2011   • Degeneration of cervical intervertebral disc    • Dizziness    • Hypertension    • Lumbosacral ligament sprain        Past Medical History:   Diagnosis Date   • Anxiety disorder 05/2018    not an issue at this time   • Back pain    • Bowel habit changes     diarrhea   • Bruxism    • Chest pain, atypical 8/26/2013   • Chickenpox    • Degeneration of cervical intervertebral disc    • Diarrhea    • Dizziness    • Fibromyalgia    • Fibromyalgia muscle pain 8/26/2013   • Heart burn    • High cholesterol    • Hypertension    • Hypertensive cardiovascular disease 12/30/2011   • Hypothyroidism    • Lumbosacral (joint) (ligament) sprain    • Lumbosacral (joint) (ligament) sprain    • Obesity    • Pain 05/2018    all over   • Painful joint    • Sleep apnea     \"border line apnea\" uses CPAP    • SOB (shortness of breath) 8/26/2013    panic attacks   • Sore muscles    • Swelling of lower extremity    • Tonsillitis    • Wears glasses         Past Surgical History:   Procedure Laterality Date   • VENTRAL HERNIA REPAIR ROBOTIC XI  9/30/2019    Procedure: REPAIR, HERNIA, VENTRAL, ROBOT-ASSISTED, USING DA FREDIS XI- EXTENDED VIEW EXTRAPERITONEAL INCISIONAL HERNIA WITH MESH PLACEMENT;  Surgeon: Jamie Randolph M.D.;  Location: [facility];  Service: General   • VENTRAL HERNIA REPAIR ROBOTIC XI  12/3/2018    Procedure: VENTRAL HERNIA REPAIR ROBOTIC XI - INCISIONAL W/POSS MESH PLACEMENT;  Surgeon: Jamie Randolph M.D.;  Location: SUR[facility];  Service: General   • HIP ARTH ANTERIOR TOTAL Left 5/25/2018    Procedure: HIP ARTHROPLASTY ANTERIOR TOTAL;  Surgeon: Valdez Ndiaye M.D.;  Location: [facility];  Service: Orthopedics   • HIP REVISION TOTAL  2018 "    lt   • CHOLECYSTECTOMY     • GYN SURGERY      ablation    • HIP REPLACEMENT, TOTAL     • LAMINOTOMY     • OTHER NEUROLOGICAL SURG      C spine surgeries X2   • OTHER NEUROLOGICAL SURG      lower back X4   • OTHER NEUROLOGICAL SURG      lumbar hardware removal    • OTHER ORTHOPEDIC SURGERY      bilateral hip arthroscopy   • OTHER SURGICAL PROCEDURE  cervical vertebral fusion   • OTHER SURGICAL PROCEDURE  hip repair   • OTHER SURGICAL PROCEDURE  lamenectomy decompress   • TONSILLECTOMY         Family History   Problem Relation Age of Onset   • Alcohol abuse Father    • No Known Problems Mother    • Cancer Maternal Grandmother 94        nonhodgkins lymphoma   • Alzheimer's Disease Paternal Grandfather    • Asthma Brother         Grew out of it.       Social History     Socioeconomic History   • Marital status:      Spouse name: Not on file   • Number of children: Not on file   • Years of education: Not on file   • Highest education level: Not on file   Occupational History   • Not on file   Tobacco Use   • Smoking status: Former Smoker     Packs/day: 1.00     Years: 10.00     Pack years: 10.00     Types: Cigarettes     Quit date: 1988     Years since quittin.6   • Smokeless tobacco: Never Used   Vaping Use   • Vaping Use: Never used   Substance and Sexual Activity   • Alcohol use: Yes     Alcohol/week: 1.2 oz     Types: 2 Glasses of wine per week   • Drug use: No   • Sexual activity: Not on file   Other Topics Concern   • Not on file   Social History Narrative   • Not on file     Social Determinants of Health     Financial Resource Strain:    • Difficulty of Paying Living Expenses:    Food Insecurity:    • Worried About Running Out of Food in the Last Year:    • Ran Out of Food in the Last Year:    Transportation Needs:    • Lack of Transportation (Medical):    • Lack of Transportation (Non-Medical):    Physical Activity:    • Days of Exercise per Week:    • Minutes of Exercise per Session:   "  Stress:    • Feeling of Stress :    Social Connections:    • Frequency of Communication with Friends and Family:    • Frequency of Social Gatherings with Friends and Family:    • Attends Sikh Services:    • Active Member of Clubs or Organizations:    • Attends Club or Organization Meetings:    • Marital Status:    Intimate Partner Violence:    • Fear of Current or Ex-Partner:    • Emotionally Abused:    • Physically Abused:    • Sexually Abused:        Current Outpatient Medications   Medication Sig Dispense Refill   • Estradiol-Estriol-Progesterone (BIEST/PROGESTERONE TD) Place  on the skin.     • NON SPECIFIED Take 0.25 mL by mouth every day. Biest progesterone compounded drops from [facility]     • Cholecalciferol (VITAMIN D PO) Take 10,000 Units by mouth every day. Pt takes liquid     • Melatonin 1 MG Tab Take 1 Tab by mouth every bedtime.     • MAGNESIUM PO Take 2 Tabs by mouth 2 Times a Day.     • B Complex Vitamins (VITAMIN-B COMPLEX PO) Take 1 Tab by mouth every day.     • Ascorbic Acid (VITAMIN C PO) Take 1 Tab by mouth every day.     • metoprolol SR (TOPROL XL) 50 MG TABLET SR 24 HR Take 50 mg by mouth every morning.     • progesterone (PROMETRIUM) 200 MG capsule Take 200 mg by mouth every evening.     • thyroid (ARMOUR THYROID) 60 MG TABS Take 60 mg by mouth every morning.       No current facility-administered medications for this visit.    \"CURRENT RX\"    ALLERGIES: Banana, Demerol, Diazepam, Flexeril [cyclobenzaprine hcl], Nsaids, Percocet [oxycodone-acetaminophen], Vicodin [hydrocodone-acetaminophen], Eggs, Gabapentin, Gluten meal, and Ultram [tramadol]    ROS    Constitutional: Denies fever, chills, sweats,  weight loss, fatigue.  Eyes: Denies vision loss, pain, drainage, double vision, glasses.  Ears/Nose/Mouth/Throat: Denies earache, difficulty hearing, rhinitis/nasal congestion, injury, recurrent sore throat, persistent hoarseness, decayed teeth/toothaches, ringing or buzzing in the " "ears.  Cardiovascular: Denies chest pain, tightness, palpitations, swelling in legs/feet, fainting, difficulty breathing when lying down but gets better when sitting up.   Respiratory: Denies shortness of breath, cough, sputum, wheezing, painful breathing, coughing up blood.   Sleep: per HPI  Gastrointestinal: Denies  difficulty swallowing, nausea, abdominal pain, diarrhea, constipation, heartburn.  Genitourinary: Denies  blood in urine, discharge, frequent urination.   Musculoskeletal: Denies painful joints, sore muscles, back pain.   Integumentary: Denies rashes, lumps, color changes.   Neurological: Denies frequent headaches,weakness, dizziness.    PHYSICAL EXAM  Obese    /78 (BP Location: Left arm, Patient Position: Sitting, BP Cuff Size: Adult)   Pulse 80   Resp 16   Ht 1.626 m (5' 4\")   Wt 95.3 kg (210 lb)   LMP  (LMP Unknown)   SpO2 92%   BMI 36.05 kg/m²   Appearance: Well-nourished, well-developed, no acute distress  Eyes:  PERRLA, EOMI  ENMT: masked  Neck: Supple, trachea midline, no masses  Respiratory effort:  No intercostal retractions or use of accessory muscles  Lung auscultation:  No wheezes rhonchi rubs or rales  Cardiac: No murmurs, rubs, or gallops; regular rhythm, normal rate; no edema  Abdomen:  No tenderness, no organomegaly  Musculoskeletal:  Grossly normal; gait and station normal; digits and nails normal  Skin:  No rashes, petechiae, cyanosis  Neurologic: without focal signs; oriented to person, time, place, and purpose; judgement intact  Psychiatric:  No depression, anxiety, agitation        Medical Decision Making:  The medical record was reviewed in its entirety including the referral notes, records from primary care, consultants notes, hospital records, labs, imaging, microbiology, immunology, and immunizations.  Care gaps identified and reviewed with the patient.    Diagnostic and titration nocturnal polysomnograms, home sleep apnea tests, continuous nocturnal oximetry " results, multiple sleep latency tests, and compliance reports reviewed.        1. Obstructive sleep apnea  - DME CPAP    2. Hypertension, unspecified type    3. Increased BMI    4. Former smoker    5. Acquired hypothyroidism    Other orders  - Estradiol-Estriol-Progesterone (BIEST/PROGESTERONE TD); Place  on the skin.   - OBESITY COUNSELING (No Charge): Patient identified as having weight management issue.  Appropriate orders and counseling given.    If your BMI is 25-29.9 you are overweight. If your BMI is 30 or greater you are obese. To lose weight eat less, move more, or both. Any diet that reduces caloric intake can help with weight loss. Extra weight may reduce your lifespan. Avoid dramatic unsustainable dietary changes that result in the yo-yo effect (down then back up.)  Usually small modifications in diet and exercise are easier to stick with.       PLAN:   Has been advised to continue the current apap 5-10 cm water, clean equipment frequently, and get new mask and supplies as allowed by insurance and DME. Advised about potential problems including nasal obstruction/stuffiness, mask leak or discomfort , frequent awakenings, chill or dryness of the upper airway, noise, inconvenience, and lack of benefit. Recommend an earlier appointment, if significant treatment barriers develop.    Positive airway pressure will favorably impact many of the adverse conditions and effects provoked by KATE.    Have advised the patient to follow up with the appropriate healthcare practitioners for all other medical problems and issues.    Mask wearing, handwashing, and social distancing protocols reviewed and encouraged.      Return in about 10 weeks (around 10/11/2021).

## 2021-08-12 ENCOUNTER — TELEPHONE (OUTPATIENT)
Dept: SLEEP MEDICINE | Facility: MEDICAL CENTER | Age: 61
End: 2021-08-12

## 2021-08-12 NOTE — TELEPHONE ENCOUNTER
Rosmery from [facility] calling they got our orders from Dr Queen BUT they are asking for a compliance card download. I do not seen one from her visit of 8/2/21 ever being scanned in nor downloaded. I tried to do one wirelessly BUT nothing comes up. We had not seen this patient since 2016.Caller notified no current download done by us. Rosmery states she will contact patient.

## 2021-08-13 NOTE — TELEPHONE ENCOUNTER
I pulled a VM from Rosmery from [facility] that came in 8/12/21 at 1121am, before her call that Vida took. I was able to find a recent compliance in Care  and faxed it to [facility]. It has also been scanned into the chart.

## 2022-07-17 SDOH — HEALTH STABILITY: PHYSICAL HEALTH: ON AVERAGE, HOW MANY MINUTES DO YOU ENGAGE IN EXERCISE AT THIS LEVEL?: 30 MIN

## 2022-07-17 SDOH — ECONOMIC STABILITY: TRANSPORTATION INSECURITY
IN THE PAST 12 MONTHS, HAS THE LACK OF TRANSPORTATION KEPT YOU FROM MEDICAL APPOINTMENTS OR FROM GETTING MEDICATIONS?: PATIENT DECLINED

## 2022-07-17 SDOH — HEALTH STABILITY: MENTAL HEALTH
STRESS IS WHEN SOMEONE FEELS TENSE, NERVOUS, ANXIOUS, OR CAN'T SLEEP AT NIGHT BECAUSE THEIR MIND IS TROUBLED. HOW STRESSED ARE YOU?: PATIENT DECLINED

## 2022-07-17 SDOH — ECONOMIC STABILITY: HOUSING INSECURITY
IN THE LAST 12 MONTHS, WAS THERE A TIME WHEN YOU DID NOT HAVE A STEADY PLACE TO SLEEP OR SLEPT IN A SHELTER (INCLUDING NOW)?: PATIENT REFUSED

## 2022-07-17 SDOH — ECONOMIC STABILITY: INCOME INSECURITY: IN THE LAST 12 MONTHS, WAS THERE A TIME WHEN YOU WERE NOT ABLE TO PAY THE MORTGAGE OR RENT ON TIME?: PATIENT REFUSED

## 2022-07-17 SDOH — ECONOMIC STABILITY: HOUSING INSECURITY

## 2022-07-17 SDOH — ECONOMIC STABILITY: FOOD INSECURITY: WITHIN THE PAST 12 MONTHS, THE FOOD YOU BOUGHT JUST DIDN'T LAST AND YOU DIDN'T HAVE MONEY TO GET MORE.: PATIENT DECLINED

## 2022-07-17 SDOH — HEALTH STABILITY: PHYSICAL HEALTH: ON AVERAGE, HOW MANY DAYS PER WEEK DO YOU ENGAGE IN MODERATE TO STRENUOUS EXERCISE (LIKE A BRISK WALK)?: 7 DAYS

## 2022-07-17 SDOH — ECONOMIC STABILITY: TRANSPORTATION INSECURITY
IN THE PAST 12 MONTHS, HAS LACK OF TRANSPORTATION KEPT YOU FROM MEETINGS, WORK, OR FROM GETTING THINGS NEEDED FOR DAILY LIVING?: PATIENT DECLINED

## 2022-07-17 SDOH — ECONOMIC STABILITY: INCOME INSECURITY: HOW HARD IS IT FOR YOU TO PAY FOR THE VERY BASICS LIKE FOOD, HOUSING, MEDICAL CARE, AND HEATING?: PATIENT DECLINED

## 2022-07-17 SDOH — ECONOMIC STABILITY: FOOD INSECURITY: WITHIN THE PAST 12 MONTHS, YOU WORRIED THAT YOUR FOOD WOULD RUN OUT BEFORE YOU GOT MONEY TO BUY MORE.: PATIENT DECLINED

## 2022-07-17 SDOH — ECONOMIC STABILITY: TRANSPORTATION INSECURITY
IN THE PAST 12 MONTHS, HAS LACK OF RELIABLE TRANSPORTATION KEPT YOU FROM MEDICAL APPOINTMENTS, MEETINGS, WORK OR FROM GETTING THINGS NEEDED FOR DAILY LIVING?: PATIENT DECLINED

## 2022-07-17 ASSESSMENT — LIFESTYLE VARIABLES
SKIP TO QUESTIONS 9-10: 0
HOW MANY STANDARD DRINKS CONTAINING ALCOHOL DO YOU HAVE ON A TYPICAL DAY: PATIENT DECLINED
HOW OFTEN DO YOU HAVE A DRINK CONTAINING ALCOHOL: PATIENT DECLINED
AUDIT-C TOTAL SCORE: -1
HOW OFTEN DO YOU HAVE SIX OR MORE DRINKS ON ONE OCCASION: PATIENT DECLINED

## 2022-07-17 ASSESSMENT — SOCIAL DETERMINANTS OF HEALTH (SDOH)
HOW HARD IS IT FOR YOU TO PAY FOR THE VERY BASICS LIKE FOOD, HOUSING, MEDICAL CARE, AND HEATING?: PATIENT DECLINED
IN A TYPICAL WEEK, HOW MANY TIMES DO YOU TALK ON THE PHONE WITH FAMILY, FRIENDS, OR NEIGHBORS?: PATIENT DECLINED
HOW OFTEN DO YOU ATTENT MEETINGS OF THE CLUB OR ORGANIZATION YOU BELONG TO?: PATIENT DECLINED
WITHIN THE PAST 12 MONTHS, YOU WORRIED THAT YOUR FOOD WOULD RUN OUT BEFORE YOU GOT THE MONEY TO BUY MORE: PATIENT DECLINED
HOW OFTEN DO YOU HAVE SIX OR MORE DRINKS ON ONE OCCASION: PATIENT DECLINED
HOW OFTEN DO YOU GET TOGETHER WITH FRIENDS OR RELATIVES?: PATIENT DECLINED
HOW OFTEN DO YOU ATTEND CHURCH OR RELIGIOUS SERVICES?: PATIENT DECLINED
DO YOU BELONG TO ANY CLUBS OR ORGANIZATIONS SUCH AS CHURCH GROUPS UNIONS, FRATERNAL OR ATHLETIC GROUPS, OR SCHOOL GROUPS?: NO
DO YOU BELONG TO ANY CLUBS OR ORGANIZATIONS SUCH AS CHURCH GROUPS UNIONS, FRATERNAL OR ATHLETIC GROUPS, OR SCHOOL GROUPS?: NO
HOW OFTEN DO YOU HAVE A DRINK CONTAINING ALCOHOL: PATIENT DECLINED
HOW OFTEN DO YOU ATTENT MEETINGS OF THE CLUB OR ORGANIZATION YOU BELONG TO?: PATIENT DECLINED
HOW MANY DRINKS CONTAINING ALCOHOL DO YOU HAVE ON A TYPICAL DAY WHEN YOU ARE DRINKING: PATIENT DECLINED
IN A TYPICAL WEEK, HOW MANY TIMES DO YOU TALK ON THE PHONE WITH FAMILY, FRIENDS, OR NEIGHBORS?: PATIENT DECLINED
HOW OFTEN DO YOU ATTEND CHURCH OR RELIGIOUS SERVICES?: PATIENT DECLINED
HOW OFTEN DO YOU GET TOGETHER WITH FRIENDS OR RELATIVES?: PATIENT DECLINED

## 2022-07-20 ENCOUNTER — OFFICE VISIT (OUTPATIENT)
Dept: MEDICAL GROUP | Facility: CLINIC | Age: 62
End: 2022-07-20
Payer: COMMERCIAL

## 2022-07-20 VITALS
BODY MASS INDEX: 36.19 KG/M2 | SYSTOLIC BLOOD PRESSURE: 110 MMHG | DIASTOLIC BLOOD PRESSURE: 70 MMHG | WEIGHT: 212 LBS | OXYGEN SATURATION: 96 % | HEIGHT: 64 IN | HEART RATE: 70 BPM | TEMPERATURE: 97.6 F

## 2022-07-20 DIAGNOSIS — D22.9 MULTIPLE BENIGN NEVI: ICD-10-CM

## 2022-07-20 DIAGNOSIS — K52.9 CHRONIC DIARRHEA: ICD-10-CM

## 2022-07-20 PROBLEM — I25.10 CORONARY ARTERY DISEASE INVOLVING NATIVE CORONARY ARTERY OF NATIVE HEART WITHOUT ANGINA PECTORIS: Status: ACTIVE | Noted: 2022-05-16

## 2022-07-20 PROCEDURE — 99203 OFFICE O/P NEW LOW 30 MIN: CPT | Mod: GC

## 2022-07-20 RX ORDER — DIPHENOXYLATE HYDROCHLORIDE AND ATROPINE SULFATE 2.5; .025 MG/1; MG/1
1 TABLET ORAL 3 TIMES DAILY PRN
Qty: 90 TABLET | Refills: 3 | Status: SHIPPED | OUTPATIENT
Start: 2022-07-20 | End: 2022-08-19

## 2022-07-20 RX ORDER — PROGESTERONE 200 MG/1
200 CAPSULE ORAL
COMMUNITY
End: 2022-07-20

## 2022-07-20 RX ORDER — THYROID 60 MG/1
60 TABLET ORAL DAILY
COMMUNITY
End: 2022-07-20

## 2022-07-20 ASSESSMENT — PATIENT HEALTH QUESTIONNAIRE - PHQ9: CLINICAL INTERPRETATION OF PHQ2 SCORE: 0

## 2022-07-20 ASSESSMENT — FIBROSIS 4 INDEX: FIB4 SCORE: 1.06

## 2022-07-20 NOTE — PROGRESS NOTES
Subjective:     CC: Establish care    HISTORY OF THE PRESENT ILLNESS: Patient is a 61 y.o. female. This pleasant patient is here today to establish care and discuss referrals to GI, dermatology, and pain management.  Patient reports feeling overall well but does complain of intermittent diarrhea that has been present for over 10 years.  Patient believes that she might have a gluten intolerance but has not been formally diagnosed.  Reports using Lomotil in the past which has been efficacious for her and would like to request a prescription.    Current Outpatient Medications Ordered in Epic   Medication Sig Dispense Refill   • diphenoxylate-atropine (LOMOTIL) 2.5-0.025 MG Tab Take 1 Tablet by mouth 3 times a day as needed for Diarrhea for up to 30 days. 90 Tablet 3   • Estradiol-Estriol-Progesterone (BIEST/PROGESTERONE TD) Place  on the skin.     • Cholecalciferol (VITAMIN D PO) Take 10,000 Units by mouth every day. Pt takes liquid     • Melatonin 1 MG Tab Take 1 Tab by mouth every bedtime.     • MAGNESIUM PO Take 2 Tabs by mouth 2 Times a Day.     • B Complex Vitamins (VITAMIN-B COMPLEX PO) Take 1 Tab by mouth every day.     • Ascorbic Acid (VITAMIN C PO) Take 1 Tab by mouth every day.     • metoprolol SR (TOPROL XL) 50 MG TABLET SR 24 HR Take 50 mg by mouth every morning.     • progesterone (PROMETRIUM) 200 MG capsule Take 200 mg by mouth every evening.     • thyroid (ARMOUR THYROID) 60 MG Tab Take 60 mg by mouth every morning.     • NON SPECIFIED Take 0.25 mL by mouth every day. Biest progesterone compounded drops from [facility]       No current Epic-ordered facility-administered medications on file.       ROS:   Gen: no fevers/chills, no changes in weight  Eyes: no changes in vision  ENT: no changes in hearing  Pulm: no sob, no cough  CV: no chest pain, no palpitations  GI: no nausea/vomiting, recurrent intermittent diarrhea  MSk: Chronic pain  Skin: no rash  Neuro: no headaches, no numbness/tingling     "  Objective:       Exam: /70   Pulse 70   Temp 36.4 °C (97.6 °F)   Ht 1.626 m (5' 4\")   Wt 96.2 kg (212 lb)   SpO2 96%  Body mass index is 36.39 kg/m².    General: Normal appearing. No distress.  HEENT: Normocephalic. Eyes conjunctiva clear lids without ptosis, pupils equal and reactive to light accommodation, ears normal shape and contour, canals are clear bilaterally, tympanic membranes are benign, nasal mucosa benign, oropharynx is without erythema, edema or exudates.   Neck: Supple without JVD or bruit. Thyroid is not enlarged.  Pulmonary: Clear to ausculation.  Normal effort. No rales, ronchi, or wheezing.  Cardiovascular: Regular rate and rhythm without murmur. Carotid and radial pulses are intact and equal bilaterally.  Abdomen: Soft, nontender, nondistended. Normal bowel sounds. Liver and spleen are not palpable  Neurologic: Grossly nonfocal  Lymph: No cervical or supraclavicular lymph nodes are palpable  Skin: Warm and dry.  No obvious lesions.  Scar on anterior aspect of neck status post C-spine fusion surgery  Musculoskeletal: Normal gait. No extremity cyanosis, clubbing, or edema.  Psych: Normal mood and affect. Alert and oriented x3. Judgment and insight is normal.          Assessment & Plan:   61 y.o. female with the following -    1. Chronic diarrhea  Chronic recurrent intermittent nonbloody diarrhea suspected to be due to gluten intolerance.  Last colonoscopy approximately 10 years ago with no abnormal findings.  - diphenoxylate-atropine (LOMOTIL) 2.5-0.025 MG Tab; Take 1 Tablet by mouth 3 times a day as needed for Diarrhea for up to 30 days.  Dispense: 90 Tablet; Refill: 3  - Referral to Gastroenterology    2. Multiple benign nevi  Per request of patient.  No concerning lesions at this time.  - Referral to Dermatology       No follow-ups on file.    Bridger Godoy M.D.   PGY1  "

## 2022-08-31 ENCOUNTER — APPOINTMENT (RX ONLY)
Dept: URBAN - METROPOLITAN AREA CLINIC 4 | Facility: CLINIC | Age: 62
Setting detail: DERMATOLOGY
End: 2022-08-31

## 2022-08-31 DIAGNOSIS — D22 MELANOCYTIC NEVI: ICD-10-CM

## 2022-08-31 DIAGNOSIS — L57.0 ACTINIC KERATOSIS: ICD-10-CM

## 2022-08-31 DIAGNOSIS — Z71.89 OTHER SPECIFIED COUNSELING: ICD-10-CM

## 2022-08-31 DIAGNOSIS — L81.4 OTHER MELANIN HYPERPIGMENTATION: ICD-10-CM

## 2022-08-31 PROBLEM — D22.5 MELANOCYTIC NEVI OF TRUNK: Status: ACTIVE | Noted: 2022-08-31

## 2022-08-31 PROCEDURE — 17000 DESTRUCT PREMALG LESION: CPT

## 2022-08-31 PROCEDURE — ? LIQUID NITROGEN

## 2022-08-31 PROCEDURE — ? OBSERVATION

## 2022-08-31 PROCEDURE — ? COUNSELING

## 2022-08-31 PROCEDURE — 99203 OFFICE O/P NEW LOW 30 MIN: CPT | Mod: 25

## 2022-08-31 PROCEDURE — ? PHOTODYNAMIC THERAPY COUNSELING

## 2022-08-31 PROCEDURE — 17003 DESTRUCT PREMALG LES 2-14: CPT

## 2022-08-31 ASSESSMENT — LOCATION ZONE DERM
LOCATION ZONE: EYELID
LOCATION ZONE: TRUNK
LOCATION ZONE: LIP

## 2022-08-31 ASSESSMENT — LOCATION DETAILED DESCRIPTION DERM
LOCATION DETAILED: RIGHT UPPER CUTANEOUS LIP
LOCATION DETAILED: LEFT LOWER CUTANEOUS LIP
LOCATION DETAILED: LEFT UPPER CUTANEOUS LIP
LOCATION DETAILED: LEFT LATERAL SUPERIOR CHEST
LOCATION DETAILED: LEFT LATERAL CANTHUS

## 2022-08-31 ASSESSMENT — LOCATION SIMPLE DESCRIPTION DERM
LOCATION SIMPLE: LEFT EYELID
LOCATION SIMPLE: LEFT LIP
LOCATION SIMPLE: RIGHT LIP
LOCATION SIMPLE: CHEST

## 2022-08-31 NOTE — PROCEDURE: MIPS QUALITY
Quality 111:Pneumonia Vaccination Status For Older Adults: Pneumococcal vaccine administered on or after patient’s 60th birthday and before the end of the measurement period
Quality 130: Documentation Of Current Medications In The Medical Record: Current Medications Documented
Quality 402: Tobacco Use And Help With Quitting Among Adolescents: Patient screened for tobacco and never smoked
Detail Level: Detailed
Quality 226: Preventive Care And Screening: Tobacco Use: Screening And Cessation Intervention: Patient screened for tobacco use and is an ex/non-smoker

## 2022-08-31 NOTE — PROCEDURE: LIQUID NITROGEN
Number Of Freeze-Thaw Cycles: 3 freeze-thaw cycles
Render Post-Care Instructions In Note?: no
Detail Level: Detailed
Show Aperture Variable?: Yes
Consent: The patient's consent was obtained including but not limited to risks of crusting, scabbing, blistering, scarring, darker or lighter pigmentary change, recurrence, incomplete removal and infection.
Post-Care Instructions: I reviewed with the patient in detail post-care instructions. Patient is to wear sunprotection, and avoid picking at any of the treated lesions. Pt may apply Vaseline to crusted or scabbing areas.
Duration Of Freeze Thaw-Cycle (Seconds): 0

## 2022-11-07 ENCOUNTER — APPOINTMENT (RX ONLY)
Dept: URBAN - METROPOLITAN AREA CLINIC 4 | Facility: CLINIC | Age: 62
Setting detail: DERMATOLOGY
End: 2022-11-07

## 2022-11-07 DIAGNOSIS — L21.8 OTHER SEBORRHEIC DERMATITIS: ICD-10-CM

## 2022-11-07 DIAGNOSIS — L81.4 OTHER MELANIN HYPERPIGMENTATION: ICD-10-CM

## 2022-11-07 DIAGNOSIS — L57.0 ACTINIC KERATOSIS: ICD-10-CM

## 2022-11-07 PROCEDURE — ? COUNSELING

## 2022-11-07 PROCEDURE — 17000 DESTRUCT PREMALG LESION: CPT

## 2022-11-07 PROCEDURE — ? PRESCRIPTION

## 2022-11-07 PROCEDURE — ? ADDITIONAL NOTES

## 2022-11-07 PROCEDURE — ? LIQUID NITROGEN

## 2022-11-07 PROCEDURE — 99213 OFFICE O/P EST LOW 20 MIN: CPT | Mod: 25

## 2022-11-07 RX ORDER — FLUOROURACIL 2 G/40G
CREAM TOPICAL BID
Qty: 40 | Refills: 0 | Status: ERX | COMMUNITY
Start: 2022-11-07

## 2022-11-07 RX ADMIN — FLUOROURACIL: 2 CREAM TOPICAL at 00:00

## 2022-11-07 ASSESSMENT — LOCATION DETAILED DESCRIPTION DERM
LOCATION DETAILED: RIGHT UPPER CUTANEOUS LIP
LOCATION DETAILED: LEFT CENTRAL EYEBROW
LOCATION DETAILED: LEFT NASAL ALA
LOCATION DETAILED: LEFT INFERIOR VERMILION BORDER
LOCATION DETAILED: RIGHT CENTRAL EYEBROW
LOCATION DETAILED: RIGHT INFERIOR MEDIAL MALAR CHEEK

## 2022-11-07 ASSESSMENT — LOCATION ZONE DERM
LOCATION ZONE: NOSE
LOCATION ZONE: FACE
LOCATION ZONE: LIP

## 2022-11-07 ASSESSMENT — LOCATION SIMPLE DESCRIPTION DERM
LOCATION SIMPLE: LEFT NOSE
LOCATION SIMPLE: LEFT LOWER LIP
LOCATION SIMPLE: RIGHT CHEEK
LOCATION SIMPLE: RIGHT EYEBROW
LOCATION SIMPLE: LEFT EYEBROW
LOCATION SIMPLE: RIGHT LIP

## 2022-11-07 NOTE — PROCEDURE: COUNSELING
Detail Level: Detailed
Detail Level: Zone
Shampoo Recommendations: Over the counter head and shoulder or Tsal

## 2022-11-07 NOTE — PROCEDURE: LIQUID NITROGEN
Post-Care Instructions: I reviewed with the patient in detail post-care instructions. Patient is to wear sunprotection, and avoid picking at any of the treated lesions. Pt may apply Vaseline to crusted or scabbing areas.
Render Note In Bullet Format When Appropriate: No
Detail Level: Detailed
Show Applicator Variable?: Yes
Consent: The patient's consent was obtained including but not limited to risks of crusting, scabbing, blistering, scarring, darker or lighter pigmentary change, recurrence, incomplete removal and infection.
Duration Of Freeze Thaw-Cycle (Seconds): 0
Number Of Freeze-Thaw Cycles: 3 freeze-thaw cycles

## 2022-11-07 NOTE — PROCEDURE: ADDITIONAL NOTES
Adequate: hears normal conversation without difficulty
Additional Notes: Patient reports having ketoconazole shampoo. Recommended she use this to treat. If this does not improve, recommended she returns to office for further evaluation.
Detail Level: Detailed
Render Risk Assessment In Note?: no

## 2023-03-10 NOTE — PROCEDURE: REASSURANCE
Hide Additional Notes?: No
Detail Level: Zone
Hydroquinone Counseling:  Patient advised that medication may result in skin irritation, lightening (hypopigmentation), dryness, and burning.  In the event of skin irritation, the patient was advised to reduce the amount of the drug applied or use it less frequently.  Rarely, spots that are treated with hydroquinone can become darker (pseudoochronosis).  Should this occur, patient instructed to stop medication and call the office. The patient verbalized understanding of the proper use and possible adverse effects of hydroquinone.  All of the patient's questions and concerns were addressed.

## 2023-04-19 ENCOUNTER — HOSPITAL ENCOUNTER (OUTPATIENT)
Dept: LAB | Facility: MEDICAL CENTER | Age: 63
End: 2023-04-19
Attending: OBSTETRICS & GYNECOLOGY
Payer: COMMERCIAL

## 2023-04-19 LAB
ALBUMIN SERPL BCP-MCNC: 4 G/DL (ref 3.2–4.9)
ALBUMIN/GLOB SERPL: 1.3 G/DL
ALP SERPL-CCNC: 54 U/L (ref 30–99)
ALT SERPL-CCNC: 21 U/L (ref 2–50)
AMYLASE SERPL-CCNC: 39 U/L (ref 20–103)
ANION GAP SERPL CALC-SCNC: 11 MMOL/L (ref 7–16)
AST SERPL-CCNC: 23 U/L (ref 12–45)
BASOPHILS # BLD AUTO: 0.4 % (ref 0–1.8)
BASOPHILS # BLD: 0.03 K/UL (ref 0–0.12)
BILIRUB SERPL-MCNC: 0.6 MG/DL (ref 0.1–1.5)
BUN SERPL-MCNC: 12 MG/DL (ref 8–22)
CALCIUM ALBUM COR SERPL-MCNC: 9.2 MG/DL (ref 8.5–10.5)
CALCIUM SERPL-MCNC: 9.2 MG/DL (ref 8.5–10.5)
CHLORIDE SERPL-SCNC: 108 MMOL/L (ref 96–112)
CO2 SERPL-SCNC: 27 MMOL/L (ref 20–33)
CREAT SERPL-MCNC: 0.64 MG/DL (ref 0.5–1.4)
EOSINOPHIL # BLD AUTO: 0.07 K/UL (ref 0–0.51)
EOSINOPHIL NFR BLD: 0.9 % (ref 0–6.9)
ERYTHROCYTE [DISTWIDTH] IN BLOOD BY AUTOMATED COUNT: 45.9 FL (ref 35.9–50)
ESTRADIOL SERPL-MCNC: 33.9 PG/ML
GFR SERPLBLD CREATININE-BSD FMLA CKD-EPI: 100 ML/MIN/1.73 M 2
GLOBULIN SER CALC-MCNC: 3 G/DL (ref 1.9–3.5)
GLUCOSE SERPL-MCNC: 96 MG/DL (ref 65–99)
HCT VFR BLD AUTO: 40.1 % (ref 37–47)
HGB BLD-MCNC: 13 G/DL (ref 12–16)
IMM GRANULOCYTES # BLD AUTO: 0.02 K/UL (ref 0–0.11)
IMM GRANULOCYTES NFR BLD AUTO: 0.3 % (ref 0–0.9)
LIPASE SERPL-CCNC: 35 U/L (ref 11–82)
LYMPHOCYTES # BLD AUTO: 3.12 K/UL (ref 1–4.8)
LYMPHOCYTES NFR BLD: 42.2 % (ref 22–41)
MCH RBC QN AUTO: 30 PG (ref 27–33)
MCHC RBC AUTO-ENTMCNC: 32.4 G/DL (ref 33.6–35)
MCV RBC AUTO: 92.6 FL (ref 81.4–97.8)
MONOCYTES # BLD AUTO: 0.68 K/UL (ref 0–0.85)
MONOCYTES NFR BLD AUTO: 9.2 % (ref 0–13.4)
NEUTROPHILS # BLD AUTO: 3.48 K/UL (ref 2–7.15)
NEUTROPHILS NFR BLD: 47 % (ref 44–72)
NRBC # BLD AUTO: 0 K/UL
NRBC BLD-RTO: 0 /100 WBC
PLATELET # BLD AUTO: 209 K/UL (ref 164–446)
PMV BLD AUTO: 10.7 FL (ref 9–12.9)
POTASSIUM SERPL-SCNC: 4.2 MMOL/L (ref 3.6–5.5)
PROT SERPL-MCNC: 7 G/DL (ref 6–8.2)
RBC # BLD AUTO: 4.33 M/UL (ref 4.2–5.4)
SODIUM SERPL-SCNC: 146 MMOL/L (ref 135–145)
WBC # BLD AUTO: 7.4 K/UL (ref 4.8–10.8)

## 2023-04-19 PROCEDURE — 85025 COMPLETE CBC W/AUTO DIFF WBC: CPT

## 2023-04-19 PROCEDURE — 80053 COMPREHEN METABOLIC PANEL: CPT

## 2023-04-19 PROCEDURE — 82150 ASSAY OF AMYLASE: CPT

## 2023-04-19 PROCEDURE — 36415 COLL VENOUS BLD VENIPUNCTURE: CPT

## 2023-04-19 PROCEDURE — 83690 ASSAY OF LIPASE: CPT

## 2023-04-19 PROCEDURE — 82670 ASSAY OF TOTAL ESTRADIOL: CPT

## 2023-04-21 ENCOUNTER — HOSPITAL ENCOUNTER (OUTPATIENT)
Dept: RADIOLOGY | Facility: MEDICAL CENTER | Age: 63
End: 2023-04-21
Payer: COMMERCIAL

## 2023-04-21 DIAGNOSIS — R10.11 RIGHT UPPER QUADRANT PAIN: ICD-10-CM

## 2023-04-21 PROCEDURE — 76705 ECHO EXAM OF ABDOMEN: CPT

## 2023-05-30 PROBLEM — S83.232A COMPLEX TEAR OF MEDIAL MENISCUS OF LEFT KNEE AS CURRENT INJURY: Status: ACTIVE | Noted: 2023-05-30

## 2023-05-30 NOTE — H&P (VIEW-ONLY)
"Date of Service: 05/30/23    Chief Complaint:  Pain of the Left Knee and Pain of the Right Knee       History of Present Illness:   This is a 62 y.o. female with bilateral knee pain with the right being greater than the left currently.  She states her left knee has been sore and swollen and limiting her for quite some time.  The pain is mostly medial.  She gets occasional catching type sensations.  She has had an MRI and x-rays on that side.  Her right knee has been bugging her for past 3 months or so.  Its more severe than the left.  She has a burning sensation in her posterior hamstrings but not quite to her knee.  She has medial pain in her knee.  She gets some catching and swelling as well.    Past Medical History:   Diagnosis Date    Anxiety disorder 05/2018    not an issue at this time    Back pain     Bowel habit changes     diarrhea    Bruxism     Chest pain, atypical 8/26/2013    Chickenpox     Degeneration of cervical intervertebral disc     Diarrhea     Dizziness     Fibromyalgia     Fibromyalgia muscle pain 8/26/2013    Heart burn     High cholesterol     Hypertension     Hypertensive cardiovascular disease 12/30/2011    Hypothyroidism     Lumbosacral (joint) (ligament) sprain     Lumbosacral (joint) (ligament) sprain     Obesity     Pain 05/2018    all over    Painful joint     Sleep apnea     \"border line apnea\" uses CPAP     SOB (shortness of breath) 8/26/2013    panic attacks    Sore muscles     Swelling of lower extremity     Tonsillitis     Wears glasses        Past Surgical History:   Procedure Laterality Date    VENTRAL HERNIA REPAIR ROBOTIC XI  9/30/2019    Procedure: REPAIR, HERNIA, VENTRAL, ROBOT-ASSISTED, USING DA FREDIS XI- EXTENDED VIEW EXTRAPERITONEAL INCISIONAL HERNIA WITH MESH PLACEMENT;  Surgeon: Jamie Randolph M.D.;  Location: SURGERY [facility];  Service: General    VENTRAL HERNIA REPAIR ROBOTIC XI  12/3/2018    Procedure: VENTRAL HERNIA REPAIR ROBOTIC XI - INCISIONAL W/POSS MESH " PLACEMENT;  Surgeon: Jamie Randolph M.D.;  Location: SURGERY [facility];  Service: General    HIP ARTH ANTERIOR TOTAL Left 2018    Procedure: HIP ARTHROPLASTY ANTERIOR TOTAL;  Surgeon: Valdez Ndiaye M.D.;  Location: SURGERY [facility];  Service: Orthopedics    HIP REVISION TOTAL  2018    lt    CHOLECYSTECTOMY      GYN SURGERY      ablation     HIP REPLACEMENT, TOTAL      LAMINOTOMY      OTHER NEUROLOGICAL SURG      C spine surgeries X2    OTHER NEUROLOGICAL SURG      lower back X4    OTHER NEUROLOGICAL SURG      lumbar hardware removal     OTHER ORTHOPEDIC SURGERY      bilateral hip arthroscopy    OTHER SURGICAL PROCEDURE  cervical vertebral fusion    OTHER SURGICAL PROCEDURE  hip repair    OTHER SURGICAL PROCEDURE  lamenectomy decompress    TONSILLECTOMY         Family History   Problem Relation Age of Onset    Alcohol abuse Father     No Known Problems Mother     Cancer Maternal Grandmother 94        nonhodgkins lymphoma    Alzheimer's Disease Paternal Grandfather     Asthma Brother         Grew out of it.       Social History     Tobacco Use    Smoking status: Former     Packs/day: 1.00     Years: 10.00     Pack years: 10.00     Types: Cigarettes     Quit date: 1988     Years since quittin.4    Smokeless tobacco: Never   Vaping Use    Vaping Use: Never used   Substance Use Topics    Alcohol use: Yes     Alcohol/week: 1.2 oz     Types: 2 Glasses of wine per week    Drug use: No        Review of Systems:   In review of the following systems: Constitutional, Eyes, ENT, Cardiovascular,Respiratory, GI, , Musculoskeletal, Skin, Neuro, Psych, Hematologic, Endocrine, Allergic; no pertinent findings were found related to the chief complaint    Height & Weight    23 1040   Weight: 96.2 kg (212 lb)      Body mass index is 36.39 kg/m².     Physical Exam:  General: Well nourished, well developed, age appropriate appearance   HEENT: Normocephalic, atraumatic  Psych: Normal mood and  affect  Neck: Supple, no pain to motion  Chest/Pulmonary: breathing unlabored, no audible wheezing  Cardio: Regular heart rate and rhythm  Neuro: Sensation grossly intact to BUE and BLE, moving all four extremities  Skin: Intact with no full thickness abrasions or lacerations  Musculoskeletal: Examination of her left knee shows obvious effusion.  There is tenderness to the medial compartment.  Range of motion is limited.  There is no instability.  Donte's is positive.  Examination of the right knee shows trace effusion.  There is tenderness at the biceps femoris tendon distally but also the posterior medial and lateral joint lines.  There is no instability.  Range of motion is slightly limited.  There is tenderness at the medial compartment.    Imaging:   Weightbearing PA flexion, weightbearing AP, sunrise, and lateral of the right knee ordered today and reviewed by myself show no significant arthritic changes or joint space narrowing.  There is no acute bony changes  X-rays from outside of the left knee show mild to moderate narrowing of the medial compartment.  MRI of the left knee shows a large effusion.  Mild to moderate chondromalacia of the medial compartment.  There is a horizontal tear of the posterior horn of the medial meniscus as well as a root tear.      Assessment:   1.  Left knee medial meniscus tear involving the root  2.  Left knee mild to moderate chondromalacia  3.  Right knee suspected medial meniscus tear    Plan:   We discussed the diagnosis and treatment options at length, including operative and nonoperative intervention. They elected to proceed with surgical intervention. All questions were answered.  For the left knee I recommend an arthroscopy with a partial medial meniscectomy versus medial meniscus root repair, chondroplasty, surgeries as indicated.  For the right knee I want a get an MRI as soon as possible to evaluate the medial meniscus.  We did discuss possible arthroscopy on the  right knee and injection on the left knee depending on the MRI results as her right is currently more painful.  They are aware of what a root repair entails in regards to recovery.  As I believe her left knee has a root tear I do not think you could do bilateral arthroscopies unless she is not interested in repair.        Please note that this dictation was created using voice recognition software.  I have made every reasonable attempt to correct obvious errors, but I expect that there are errors of grammar and possibly content that I did not discover before finalizing the note.

## 2023-06-01 ENCOUNTER — APPOINTMENT (OUTPATIENT)
Dept: ADMISSIONS | Facility: MEDICAL CENTER | Age: 63
End: 2023-06-01
Attending: ORTHOPAEDIC SURGERY
Payer: COMMERCIAL

## 2023-06-06 ENCOUNTER — PRE-ADMISSION TESTING (OUTPATIENT)
Dept: ADMISSIONS | Facility: MEDICAL CENTER | Age: 63
End: 2023-06-06
Attending: ORTHOPAEDIC SURGERY
Payer: COMMERCIAL

## 2023-06-06 VITALS — HEIGHT: 64 IN | BODY MASS INDEX: 36.39 KG/M2

## 2023-06-06 PROBLEM — S83.231A COMPLEX TEAR OF MEDIAL MENISCUS OF RIGHT KNEE AS CURRENT INJURY: Status: ACTIVE | Noted: 2023-06-06

## 2023-06-06 RX ORDER — ACETAMINOPHEN 325 MG/1
325 TABLET ORAL EVERY 4 HOURS PRN
COMMUNITY
End: 2024-02-06

## 2023-06-08 ENCOUNTER — APPOINTMENT (OUTPATIENT)
Dept: ADMISSIONS | Facility: MEDICAL CENTER | Age: 63
End: 2023-06-08
Attending: ORTHOPAEDIC SURGERY
Payer: COMMERCIAL

## 2023-06-08 DIAGNOSIS — Z01.810 PRE-OPERATIVE CARDIOVASCULAR EXAMINATION: ICD-10-CM

## 2023-06-09 ENCOUNTER — APPOINTMENT (OUTPATIENT)
Dept: ADMISSIONS | Facility: MEDICAL CENTER | Age: 63
End: 2023-06-09
Attending: ORTHOPAEDIC SURGERY
Payer: COMMERCIAL

## 2023-06-09 DIAGNOSIS — Z01.810 PRE-OPERATIVE CARDIOVASCULAR EXAMINATION: ICD-10-CM

## 2023-06-09 LAB — EKG IMPRESSION: NORMAL

## 2023-06-09 PROCEDURE — 93010 ELECTROCARDIOGRAM REPORT: CPT | Performed by: INTERNAL MEDICINE

## 2023-06-09 PROCEDURE — 93005 ELECTROCARDIOGRAM TRACING: CPT

## 2023-06-14 ENCOUNTER — ANESTHESIA EVENT (OUTPATIENT)
Dept: SURGERY | Facility: MEDICAL CENTER | Age: 63
End: 2023-06-14
Payer: COMMERCIAL

## 2023-06-14 ENCOUNTER — HOSPITAL ENCOUNTER (OUTPATIENT)
Facility: MEDICAL CENTER | Age: 63
End: 2023-06-14
Attending: ORTHOPAEDIC SURGERY | Admitting: ORTHOPAEDIC SURGERY
Payer: COMMERCIAL

## 2023-06-14 ENCOUNTER — ANESTHESIA (OUTPATIENT)
Dept: SURGERY | Facility: MEDICAL CENTER | Age: 63
End: 2023-06-14
Payer: COMMERCIAL

## 2023-06-14 VITALS
OXYGEN SATURATION: 92 % | SYSTOLIC BLOOD PRESSURE: 149 MMHG | BODY MASS INDEX: 36.06 KG/M2 | TEMPERATURE: 97.9 F | HEIGHT: 64 IN | WEIGHT: 211.2 LBS | DIASTOLIC BLOOD PRESSURE: 76 MMHG | RESPIRATION RATE: 18 BRPM | HEART RATE: 74 BPM

## 2023-06-14 DIAGNOSIS — S83.232A COMPLEX TEAR OF MEDIAL MENISCUS OF LEFT KNEE AS CURRENT INJURY, INITIAL ENCOUNTER: ICD-10-CM

## 2023-06-14 PROCEDURE — 700105 HCHG RX REV CODE 258: Performed by: ANESTHESIOLOGY

## 2023-06-14 PROCEDURE — 29881 ARTHRS KNE SRG MNISECTMY M/L: CPT | Mod: ASROC,RT | Performed by: PHYSICIAN ASSISTANT

## 2023-06-14 PROCEDURE — 01400 ANES OPN/ARTHRS KNEE JT NOS: CPT | Performed by: ANESTHESIOLOGY

## 2023-06-14 PROCEDURE — A9270 NON-COVERED ITEM OR SERVICE: HCPCS | Performed by: ANESTHESIOLOGY

## 2023-06-14 PROCEDURE — 700111 HCHG RX REV CODE 636 W/ 250 OVERRIDE (IP): Performed by: ANESTHESIOLOGY

## 2023-06-14 PROCEDURE — 160048 HCHG OR STATISTICAL LEVEL 1-5: Performed by: ORTHOPAEDIC SURGERY

## 2023-06-14 PROCEDURE — 700111 HCHG RX REV CODE 636 W/ 250 OVERRIDE (IP): Performed by: PHYSICIAN ASSISTANT

## 2023-06-14 PROCEDURE — 160025 RECOVERY II MINUTES (STATS): Performed by: ORTHOPAEDIC SURGERY

## 2023-06-14 PROCEDURE — 700101 HCHG RX REV CODE 250: Performed by: ORTHOPAEDIC SURGERY

## 2023-06-14 PROCEDURE — 160009 HCHG ANES TIME/MIN: Performed by: ORTHOPAEDIC SURGERY

## 2023-06-14 PROCEDURE — 160035 HCHG PACU - 1ST 60 MINS PHASE I: Performed by: ORTHOPAEDIC SURGERY

## 2023-06-14 PROCEDURE — 700111 HCHG RX REV CODE 636 W/ 250 OVERRIDE (IP): Performed by: ORTHOPAEDIC SURGERY

## 2023-06-14 PROCEDURE — 700101 HCHG RX REV CODE 250: Performed by: ANESTHESIOLOGY

## 2023-06-14 PROCEDURE — 160036 HCHG PACU - EA ADDL 30 MINS PHASE I: Performed by: ORTHOPAEDIC SURGERY

## 2023-06-14 PROCEDURE — 700102 HCHG RX REV CODE 250 W/ 637 OVERRIDE(OP): Performed by: ANESTHESIOLOGY

## 2023-06-14 PROCEDURE — 160002 HCHG RECOVERY MINUTES (STAT): Performed by: ORTHOPAEDIC SURGERY

## 2023-06-14 PROCEDURE — 160029 HCHG SURGERY MINUTES - 1ST 30 MINS LEVEL 4: Performed by: ORTHOPAEDIC SURGERY

## 2023-06-14 PROCEDURE — 700105 HCHG RX REV CODE 258: Performed by: ORTHOPAEDIC SURGERY

## 2023-06-14 PROCEDURE — 160046 HCHG PACU - 1ST 60 MINS PHASE II: Performed by: ORTHOPAEDIC SURGERY

## 2023-06-14 PROCEDURE — 29881 ARTHRS KNE SRG MNISECTMY M/L: CPT | Mod: RT | Performed by: ORTHOPAEDIC SURGERY

## 2023-06-14 RX ORDER — EPHEDRINE SULFATE 50 MG/ML
5 INJECTION, SOLUTION INTRAVENOUS
Status: DISCONTINUED | OUTPATIENT
Start: 2023-06-14 | End: 2023-06-14 | Stop reason: HOSPADM

## 2023-06-14 RX ORDER — HYDROCODONE BITARTRATE AND ACETAMINOPHEN 5; 325 MG/1; MG/1
1-2 TABLET ORAL EVERY 8 HOURS PRN
Qty: 12 TABLET | Refills: 0 | Status: SHIPPED | OUTPATIENT
Start: 2023-06-14 | End: 2023-06-18

## 2023-06-14 RX ORDER — SODIUM CHLORIDE, SODIUM LACTATE, POTASSIUM CHLORIDE, CALCIUM CHLORIDE 600; 310; 30; 20 MG/100ML; MG/100ML; MG/100ML; MG/100ML
INJECTION, SOLUTION INTRAVENOUS CONTINUOUS
Status: DISCONTINUED | OUTPATIENT
Start: 2023-06-14 | End: 2023-06-14 | Stop reason: HOSPADM

## 2023-06-14 RX ORDER — HYDROMORPHONE HYDROCHLORIDE 1 MG/ML
0.2 INJECTION, SOLUTION INTRAMUSCULAR; INTRAVENOUS; SUBCUTANEOUS
Status: DISCONTINUED | OUTPATIENT
Start: 2023-06-14 | End: 2023-06-14 | Stop reason: HOSPADM

## 2023-06-14 RX ORDER — ONDANSETRON 2 MG/ML
4 INJECTION INTRAMUSCULAR; INTRAVENOUS
Status: DISCONTINUED | OUTPATIENT
Start: 2023-06-14 | End: 2023-06-14 | Stop reason: HOSPADM

## 2023-06-14 RX ORDER — DIPHENHYDRAMINE HYDROCHLORIDE 50 MG/ML
12.5 INJECTION INTRAMUSCULAR; INTRAVENOUS
Status: DISCONTINUED | OUTPATIENT
Start: 2023-06-14 | End: 2023-06-14 | Stop reason: HOSPADM

## 2023-06-14 RX ORDER — HYDROMORPHONE HYDROCHLORIDE 1 MG/ML
0.1 INJECTION, SOLUTION INTRAMUSCULAR; INTRAVENOUS; SUBCUTANEOUS
Status: DISCONTINUED | OUTPATIENT
Start: 2023-06-14 | End: 2023-06-14 | Stop reason: HOSPADM

## 2023-06-14 RX ORDER — DEXAMETHASONE SODIUM PHOSPHATE 4 MG/ML
INJECTION, SOLUTION INTRA-ARTICULAR; INTRALESIONAL; INTRAMUSCULAR; INTRAVENOUS; SOFT TISSUE PRN
Status: DISCONTINUED | OUTPATIENT
Start: 2023-06-14 | End: 2023-06-14 | Stop reason: SURG

## 2023-06-14 RX ORDER — HALOPERIDOL 5 MG/ML
1 INJECTION INTRAMUSCULAR
Status: DISCONTINUED | OUTPATIENT
Start: 2023-06-14 | End: 2023-06-14 | Stop reason: HOSPADM

## 2023-06-14 RX ORDER — ONDANSETRON 2 MG/ML
INJECTION INTRAMUSCULAR; INTRAVENOUS PRN
Status: DISCONTINUED | OUTPATIENT
Start: 2023-06-14 | End: 2023-06-14 | Stop reason: SURG

## 2023-06-14 RX ORDER — OXYCODONE HCL 5 MG/5 ML
5 SOLUTION, ORAL ORAL
Status: COMPLETED | OUTPATIENT
Start: 2023-06-14 | End: 2023-06-14

## 2023-06-14 RX ORDER — MIDAZOLAM HYDROCHLORIDE 1 MG/ML
INJECTION INTRAMUSCULAR; INTRAVENOUS PRN
Status: DISCONTINUED | OUTPATIENT
Start: 2023-06-14 | End: 2023-06-14 | Stop reason: SURG

## 2023-06-14 RX ORDER — KETOROLAC TROMETHAMINE 30 MG/ML
INJECTION, SOLUTION INTRAMUSCULAR; INTRAVENOUS PRN
Status: DISCONTINUED | OUTPATIENT
Start: 2023-06-14 | End: 2023-06-14 | Stop reason: SURG

## 2023-06-14 RX ORDER — CEFAZOLIN SODIUM 1 G/3ML
2 INJECTION, POWDER, FOR SOLUTION INTRAMUSCULAR; INTRAVENOUS ONCE
Status: COMPLETED | OUTPATIENT
Start: 2023-06-14 | End: 2023-06-14

## 2023-06-14 RX ORDER — HYDRALAZINE HYDROCHLORIDE 20 MG/ML
5 INJECTION INTRAMUSCULAR; INTRAVENOUS
Status: DISCONTINUED | OUTPATIENT
Start: 2023-06-14 | End: 2023-06-14 | Stop reason: HOSPADM

## 2023-06-14 RX ORDER — ACETAMINOPHEN 500 MG
1000 TABLET ORAL ONCE
Status: DISCONTINUED | OUTPATIENT
Start: 2023-06-14 | End: 2023-06-14 | Stop reason: HOSPADM

## 2023-06-14 RX ORDER — LABETALOL HYDROCHLORIDE 5 MG/ML
5 INJECTION, SOLUTION INTRAVENOUS
Status: DISCONTINUED | OUTPATIENT
Start: 2023-06-14 | End: 2023-06-14 | Stop reason: HOSPADM

## 2023-06-14 RX ORDER — HYDROMORPHONE HYDROCHLORIDE 1 MG/ML
0.4 INJECTION, SOLUTION INTRAMUSCULAR; INTRAVENOUS; SUBCUTANEOUS
Status: DISCONTINUED | OUTPATIENT
Start: 2023-06-14 | End: 2023-06-14 | Stop reason: HOSPADM

## 2023-06-14 RX ORDER — LIDOCAINE HYDROCHLORIDE 20 MG/ML
INJECTION, SOLUTION EPIDURAL; INFILTRATION; INTRACAUDAL; PERINEURAL PRN
Status: DISCONTINUED | OUTPATIENT
Start: 2023-06-14 | End: 2023-06-14 | Stop reason: SURG

## 2023-06-14 RX ORDER — OXYCODONE HCL 5 MG/5 ML
10 SOLUTION, ORAL ORAL
Status: COMPLETED | OUTPATIENT
Start: 2023-06-14 | End: 2023-06-14

## 2023-06-14 RX ORDER — ROPIVACAINE HYDROCHLORIDE 5 MG/ML
INJECTION, SOLUTION EPIDURAL; INFILTRATION; PERINEURAL
Status: DISCONTINUED | OUTPATIENT
Start: 2023-06-14 | End: 2023-06-14 | Stop reason: HOSPADM

## 2023-06-14 RX ADMIN — FENTANYL CITRATE 50 MCG: 50 INJECTION, SOLUTION INTRAMUSCULAR; INTRAVENOUS at 15:07

## 2023-06-14 RX ADMIN — FENTANYL CITRATE 25 MCG: 50 INJECTION, SOLUTION INTRAMUSCULAR; INTRAVENOUS at 16:00

## 2023-06-14 RX ADMIN — SODIUM CHLORIDE, POTASSIUM CHLORIDE, SODIUM LACTATE AND CALCIUM CHLORIDE: 600; 310; 30; 20 INJECTION, SOLUTION INTRAVENOUS at 14:58

## 2023-06-14 RX ADMIN — CEFAZOLIN 2 G: 1 INJECTION, POWDER, FOR SOLUTION INTRAMUSCULAR; INTRAVENOUS at 15:04

## 2023-06-14 RX ADMIN — LIDOCAINE HYDROCHLORIDE 50 MG: 20 INJECTION, SOLUTION EPIDURAL; INFILTRATION; INTRACAUDAL at 15:02

## 2023-06-14 RX ADMIN — FENTANYL CITRATE 50 MCG: 50 INJECTION, SOLUTION INTRAMUSCULAR; INTRAVENOUS at 15:15

## 2023-06-14 RX ADMIN — PROPOFOL 200 MG: 10 INJECTION, EMULSION INTRAVENOUS at 15:02

## 2023-06-14 RX ADMIN — OXYCODONE HYDROCHLORIDE 5 MG: 5 SOLUTION ORAL at 15:57

## 2023-06-14 RX ADMIN — DEXAMETHASONE SODIUM PHOSPHATE 8 MG: 4 INJECTION INTRA-ARTICULAR; INTRALESIONAL; INTRAMUSCULAR; INTRAVENOUS; SOFT TISSUE at 15:04

## 2023-06-14 RX ADMIN — MIDAZOLAM 2 MG: 1 INJECTION, SOLUTION INTRAMUSCULAR; INTRAVENOUS at 14:58

## 2023-06-14 RX ADMIN — KETOROLAC TROMETHAMINE 15 MG: 30 INJECTION, SOLUTION INTRAMUSCULAR; INTRAVENOUS at 15:20

## 2023-06-14 RX ADMIN — FENTANYL CITRATE 50 MCG: 50 INJECTION, SOLUTION INTRAMUSCULAR; INTRAVENOUS at 14:58

## 2023-06-14 RX ADMIN — FENTANYL CITRATE 25 MCG: 50 INJECTION, SOLUTION INTRAMUSCULAR; INTRAVENOUS at 16:26

## 2023-06-14 RX ADMIN — FENTANYL CITRATE 25 MCG: 50 INJECTION, SOLUTION INTRAMUSCULAR; INTRAVENOUS at 16:08

## 2023-06-14 RX ADMIN — SODIUM CHLORIDE, POTASSIUM CHLORIDE, SODIUM LACTATE AND CALCIUM CHLORIDE: 600; 310; 30; 20 INJECTION, SOLUTION INTRAVENOUS at 16:05

## 2023-06-14 RX ADMIN — ONDANSETRON 4 MG: 2 INJECTION INTRAMUSCULAR; INTRAVENOUS at 15:04

## 2023-06-14 ASSESSMENT — FIBROSIS 4 INDEX: FIB4 SCORE: 1.46

## 2023-06-14 NOTE — LETTER
May 31, 2023    Patient Name: Senia Valle  Surgeon Name: Yong Cisneros M.D.  Surgery Facility: [facility] ([address] [address] [facility] [address] [facility])  Surgery Date: 6/14/2023    The time of your surgery is not final and may change up to and until the day of your surgery. You will be contacted 24-48 hours prior to your surgery date with your check-in and surgery time.    If you will not be at one of the below numbers please call the surgery scheduler at [phone]  Preferred Phone: [phone]    BEFORE YOUR SURGERY   Pre Registration and/or Lab Work must be done within and no earlier than 28 days prior to your surgery date. Please call [facility] at [phone] for an appointment as soon as possible.    DAY OF YOUR SURGERY  Nothing to eat or drink after midnight     Refrain from smoking any substance after midnight prior to surgery. Smoking may interfere with the anesthetic and frequently produces nausea during the recovery period.    Continue taking all lifesaving medications. Including the morning of your surgery with small sip of water.    Please do NOT take on the day of surgery:  Diuretics: examples- furosemide (Lasix), spironolactone, hydrochlorothiazide  ACE-inhibitors: examples- lisinopril, ramipril, enalapril  “ARBs”: examples- losartan, Olmesartan, valsartan    Please arrive at the hospital/surgery center at the check-in time provided.     An adult will need to bring you and take you home after your surgery.     AFTER YOUR SURGERY  Post op Appointment:   Date: 06/27/2023   Time: 09:15AM   With: Marc Serna PA-C    Location: [address] [place] [facility] [place] [address]    - Therapy- Your first appointment should be 7-10  day(s) after your surgery. For your convenience we have 4 Physical Therapy locations: [facility], [place], and [facility]. Call our office ASAP to schedule an appointment at [phone] or take the enclosed Therapy Prescription  to a facility of your choice.  - Post Surgery - You will need someone to drive you home  - Post Surgery - You will need someone to stay with you for 24 hours  - You must have someone provide transportation post surgery and someone to monitor you for at least 24 hours post-surgery. If you don't have either of these your appointment will be canceled.     TIME OFF WORK  FMLA or Disability forms can be faxed directly to: [phone] or you may drop them off at [address] [place] [address]. Our office charges a $35.00 fee per form. Forms will be completed within 10 business days of drop off and payment received. For the status of your forms you may contact our disability office directly [phone].    MEDICATION INSTRUCTIONS **Please read section completely**    The following medications should be stopped a minimum of 10 days prior to surgery:  All over the counter, Supplements & Herbal medications    Anorectics: Phentermine (Adipex-P, Lomaira and Suprenza), Phentermine-topiramate (Qsymia), Bupropion-naltrexone (Contrave)    Opiod Partial Agonists/Opioid Antagonists: Buprenorphine (Subocone, Belbuca, Butrans, Probuphine Implant, Sublocade), Naltrexone (ReVia, Vivitrol), Naloxone    Amphetamines: Dextroamphetamine/Amphetamine (Adderall, Mydayis), Methylphenidate Hydrochloride (Concerta, Metadate, Methylin, Ritalin)    The following medications should be stopped 5 days prior to surgery:  Blood Thinners: Any Aspirin, Aspirin products, anti-inflammatories such as ibuprofen and any blood thinners such as Coumadin and Plavix. Please consult your prescribing physician if you are on life saving blood thinners, in regards to when to stop medications prior to surgery.     The following medications should be stopped a minimum of 3 days prior to surgery:  PDE-5 inhibitors: Sildenafil (Viagra), Tadalafil (Cialis), Vardenafil (Levitra), Avanafil (Stendra)    MAO Inhibitors: Rasagiline (Azilect), Selegiline (Eldepryl,  Emsam, Selapar), Isocarboxazid (Marplan), Phenelzine (Nardil)

## 2023-06-14 NOTE — DISCHARGE INSTRUCTIONS
What to Expect Post Anesthesia    Rest and take it easy for the first 24 hours.  A responsible adult is recommended to remain with you during that time.  It is normal to feel sleepy.  We encourage you to not do anything that requires balance, judgment or coordination.    FOR 24 HOURS DO NOT:  Drive, operate machinery or run household appliances.  Drink beer or alcoholic beverages.  Make important decisions or sign legal documents.    To avoid nausea, slowly advance diet as tolerated, avoiding spicy or greasy foods for the first day.  Add more substantial food to your diet according to your provider's instructions.  Babies can be fed formula or breast milk as soon as they are hungry.  INCREASE FLUIDS AND FIBER TO AVOID CONSTIPATION.    MILD FLU-LIKE SYMPTOMS ARE NORMAL.  YOU MAY EXPERIENCE GENERALIZED MUSCLE ACHES, THROAT IRRITATION, HEADACHE AND/OR SOME NAUSEA.    If any questions arise, call your provider.  If your provider is not available, please feel free to call the Surgical Center at [phone].    MEDICATIONS: Resume taking daily medication.  Take prescribed pain medication with food.  If no medication is prescribed, you may take non-aspirin pain medication if needed.  PAIN MEDICATION CAN BE VERY CONSTIPATING.  Take a stool softener or laxative such as senokot, pericolace, or milk of magnesia if needed.    Last pain medication (Oxycodone 5 mg) given at 4:00 pm

## 2023-06-14 NOTE — ANESTHESIA TIME REPORT
Anesthesia Start and Stop Event Times     Date Time Event    6/14/2023 1444 Ready for Procedure     1458 Anesthesia Start     1529 Anesthesia Stop        Responsible Staff  06/14/23    Name Role Begin Daniel Particio M.D. Anesth 145 1529        Overtime Reason:  overtime    Comments:

## 2023-06-14 NOTE — ANESTHESIA PROCEDURE NOTES
Airway    Date/Time: 6/14/2023 3:03 PM    Performed by: Rich Patricio M.D.  Authorized by: Rich Patricio M.D.    Location:  OR  Urgency:  Elective  Indications for Airway Management:  Anesthesia      Spontaneous Ventilation: absent    Sedation Level:  Deep  Preoxygenated: Yes    Final Airway Type:  Supraglottic airway  Final Supraglottic Airway:  Standard LMA    SGA Size:  4  Number of Attempts at Approach:  1

## 2023-06-14 NOTE — ANESTHESIA PREPROCEDURE EVALUATION
Case: [id number] Date/Time: 06/14/23 1445    Procedure: Right Knee arthroscopy with medial meniscus root repair versus partial medial meniscectomy, chondroplasty, surgeries as indicated (Right) - 60 mins    Diagnosis: Complex tear of medial meniscus of right knee as current injury, initial encounter [S83.231A]    Pre-op diagnosis: Complex tear of medial meniscus of right knee as current injury, initial encounter [S83.459A]    Location: [place] [address] / SURGERY [facility]    Surgeons: Yong Cisneros M.D.          Relevant Problems   ANESTHESIA   (positive) Obstructive sleep apnea      PULMONARY   (positive) SOB (shortness of breath)      CARDIAC   (positive) Coronary artery disease involving native coronary artery of native heart without angina pectoris   (positive) Hypertension      ENDO   (positive) Acquired hypothyroidism      Other   (positive) Anxiety   (positive) Complex tear of medial meniscus of right knee as current injury   (positive) Degeneration of cervical intervertebral disc   (positive) Fibromyalgia muscle pain   (positive) Former smoker   (positive) Hypertensive cardiovascular disease   (positive) Snoring       Physical Exam    Airway   Mallampati: II  TM distance: >3 FB  Neck ROM: full       Cardiovascular - normal exam  Rhythm: regular  Rate: normal  (-) murmur     Dental - normal exam           Pulmonary - normal exam  Breath sounds clear to auscultation     Abdominal    Neurological - normal exam                 Anesthesia Plan    ASA 2       Plan - general       Airway plan will be LMA          Induction: intravenous    Postoperative Plan: Postoperative administration of opioids is intended.    Pertinent diagnostic labs and testing reviewed    Informed Consent:    Anesthetic plan and risks discussed with patient.    Use of blood products discussed with: patient whom consented to blood products.

## 2023-06-14 NOTE — INTERVAL H&P NOTE
H&P reviewed. The patient was examined and there are no changes to the H&P      After several long discussions with the patient's we have elected to do the right knee first.  We will plan on doing a right knee arthroscopy with partial medial meniscectomy.

## 2023-06-14 NOTE — OR NURSING
1527: To PACU post right knee arthroscopy. Pt is extubated, breathing is spontaneous and unlabored. Strong DP pulse observed on operative extremity.    1547: Pt c/o 7/10 pain. See MAR.    1616: Pain is tolerable at 2/10.    1625: Pt requesting more medication for increased pain. See MAR.    1635: Pain is tolerable, no nausea.    1700: Meets criteria for stage ll.

## 2023-06-14 NOTE — DISCHARGE INSTR - OTHER INFO
May remove dressings Post op Day #2 and Shower with wound uncovered.  Apply bandaids after shower.  Do not soak or submerge incisions for two weeks. Ice and elevate extremity. Follow up 7-10 days.    WBAT

## 2023-06-14 NOTE — OP REPORT
DATE OF OPERATION: 6/14/2023    SURGEON: Yong Cisneros MD     ASSISTANT: HUY Chan    PREOPERATIVE DIAGNOSES:  1.  Right knee medial meniscus tear.  2.  Right knee chondromalacia.    POSTOPERATIVE DIAGNOSES:  1.  Right knee medial meniscus tear.  2.  Right knee chondromalacia.    PROCEDURE:  1.  Right knee arthroscopy with partial medial meniscectomy and chondroplasty.    ANESTHESIA: Óscar Patricio MD    ANESTHETIC: LMA anesthesia along with a local injection.    INDICATIONS: The patient has had knee pain for quite some time.  She has failed   nonoperative treatment and elected to proceed with operative intervention.  The patient  was explained the risks, benefits, alternatives, procedure and recovery in   detail. All questions were answered. Informed consent was obtained. Site   verification per protocol was obtained in the pre-op holding area and the operating   room. Patient received appropriate preoperative antibiotics.    OPERATION: After successful anesthesia, the patient's knee was examined. The patient  had a stable ligamentous exam and good range of motion. The leg was then prepped   and draped in the usual sterile fashion. An anterolateral portal was   established with a knife and blunt trocar into the suprapatellar pouch. The   suprapatellar pouch and the medial and lateral gutters were free of abnormalities.   The patella had some grade II chondromalacia on the central ridge. This was   gently debrided with a shaver from multiple portals. I then probed it and   felt the cartilage was stable. The patellofemoral joint otherwise tracked well. The   trochlea had normal articular cartilage. The medial joint line had a complex tear of   the medial meniscus in the posterior horn and mid body. A partial medial meniscectomy was performed both with jasper and   baskets from multiple portals. I then probed the meniscus and it felt stable. The   articular cartilage on the medial femoral condyle had some mild  grade II chondromalacia diffusely. This was  gently debrided with a shaver. The notch revealed a normal ACL and PCL to visual   inspection and probing. The lateral compartment had some chondromalacia on the lateral tibial plateau.  This was gently smoothed out with a shaver.  The lateral femoral condyle was in overall good condition.  The lateral meniscus was normal to visual inspection and probing. At   that point, I was satisfied with the procedure. I lavaged out the joint,   suctioned out the fluid, closed the portals with 3-0 Prolene in an interrupted   fashion. Xeroform, 4x4s, and an ACE wrap were applied.   Patient was transferred to recovery room in stable condition.    ESTIMATED BLOOD LOSS: Minimal.    COMPLICATIONS: None.    HUY Chan  was medically necessary for the case. They helped with   instrumentation, retraction, and positioning. Without their help, the case   would not have been as technically successful.        ____________________________________  MONIQUE MATHEW MD

## 2023-06-15 ASSESSMENT — PAIN SCALES - GENERAL: PAIN_LEVEL: 4

## 2023-06-15 NOTE — ANESTHESIA POSTPROCEDURE EVALUATION
Patient: Senia Valle    Procedure Summary     Date: 06/14/23 Room / Location:  OR  / SURGERY [facility]    Anesthesia Start: 1458 Anesthesia Stop: 1529    Procedure: Right Knee arthroscopy, partial medial meniscectomy, chondroplasty (Right: Knee) Diagnosis:       Complex tear of medial meniscus of right knee as current injury, initial encounter      (Complex tear of medial meniscus of right knee as current injury, initial encounter [S83.231A])    Surgeons: Yong Cisneros M.D. Responsible Provider: Rich Patricio M.D.    Anesthesia Type: general ASA Status: 2          Final Anesthesia Type: general  Last vitals  BP   Blood Pressure: (!) 149/76    Temp   36.6 °C (97.9 °F)    Pulse   74   Resp   18    SpO2   92 %      Anesthesia Post Evaluation    Patient location during evaluation: PACU  Patient participation: complete - patient participated  Level of consciousness: awake and alert  Pain score: 4    Airway patency: patent  Anesthetic complications: no  Cardiovascular status: hemodynamically stable  Respiratory status: acceptable  Hydration status: euvolemic    PONV: none          No notable events documented.     Nurse Pain Score: 4 (NPRS)

## 2023-06-21 ENCOUNTER — HOSPITAL ENCOUNTER (OUTPATIENT)
Dept: RADIOLOGY | Facility: MEDICAL CENTER | Age: 63
End: 2023-06-21
Attending: PHYSICIAN ASSISTANT
Payer: COMMERCIAL

## 2023-06-21 DIAGNOSIS — M79.89 PAIN AND SWELLING OF RIGHT LOWER LEG: ICD-10-CM

## 2023-06-21 DIAGNOSIS — M79.661 PAIN AND SWELLING OF RIGHT LOWER LEG: ICD-10-CM

## 2023-06-21 PROCEDURE — 93971 EXTREMITY STUDY: CPT

## 2023-07-06 ENCOUNTER — PREP FOR PROCEDURE (OUTPATIENT)
Dept: SURGERY | Facility: MEDICAL CENTER | Age: 63
End: 2023-07-06
Payer: COMMERCIAL

## 2023-07-06 DIAGNOSIS — Z01.818 PRE-OP EXAMINATION: ICD-10-CM

## 2023-07-06 PROBLEM — M17.12 DEGENERATIVE ARTHRITIS OF LEFT KNEE: Status: ACTIVE | Noted: 2023-07-06

## 2023-07-06 RX ORDER — CEFAZOLIN SODIUM 1 G/3ML
2 INJECTION, POWDER, FOR SOLUTION INTRAMUSCULAR; INTRAVENOUS ONCE
OUTPATIENT
Start: 2023-07-06 | End: 2023-07-06

## 2023-07-28 ENCOUNTER — APPOINTMENT (OUTPATIENT)
Dept: RADIOLOGY | Facility: MEDICAL CENTER | Age: 63
End: 2023-07-28
Attending: OBSTETRICS & GYNECOLOGY
Payer: COMMERCIAL

## 2023-07-28 DIAGNOSIS — Z12.31 VISIT FOR SCREENING MAMMOGRAM: ICD-10-CM

## 2023-07-28 PROCEDURE — 77063 BREAST TOMOSYNTHESIS BI: CPT

## 2023-08-11 ENCOUNTER — HOSPITAL ENCOUNTER (OUTPATIENT)
Dept: RADIOLOGY | Facility: MEDICAL CENTER | Age: 63
End: 2023-08-11
Attending: ORTHOPAEDIC SURGERY
Payer: COMMERCIAL

## 2023-08-11 DIAGNOSIS — M17.12 PRIMARY OSTEOARTHRITIS OF LEFT KNEE: ICD-10-CM

## 2023-08-11 PROCEDURE — 73700 CT LOWER EXTREMITY W/O DYE: CPT | Mod: LT

## 2023-08-30 ENCOUNTER — HOSPITAL ENCOUNTER (OUTPATIENT)
Dept: LAB | Facility: MEDICAL CENTER | Age: 63
End: 2023-08-30
Attending: NURSE PRACTITIONER
Payer: COMMERCIAL

## 2023-08-30 PROCEDURE — 83520 IMMUNOASSAY QUANT NOS NONAB: CPT | Mod: 91

## 2023-08-30 PROCEDURE — 36415 COLL VENOUS BLD VENIPUNCTURE: CPT

## 2023-08-30 PROCEDURE — 83529 ASAY OF INTERLEUKIN-6 (IL-6): CPT

## 2023-08-30 PROCEDURE — 81460 WHOLE MITOCHONDRIAL GENOME: CPT

## 2023-08-30 PROCEDURE — 81465 WHOLE MITOCHONDRIAL GENOME: CPT

## 2023-09-01 LAB
IL6 SERPL-MCNC: <2 PG/ML
MISCELLANEOUS LAB RESULT MISCLAB: NORMAL
MISCELLANEOUS LAB RESULT MISCLAB: NORMAL

## 2023-09-13 LAB
MITOCHONDRIA GENES MUTATIONS FOUND [IDENTIFIER] IN BLOOD OR TISSUE BY MOLECULAR GENETICS METHOD NOMINAL: NEGATIVE
PATHOLOGY STUDY: NORMAL

## 2023-09-13 NOTE — PROGRESS NOTES
CC: Follow-up for KATE on CPAP        HPI:  Senia Valle is a 63 y.o.female  kindly referred by Stephon Rodríguez M.D. and last seen by Dr. DELGADILLO on 8/2/2021 to reestablish care for KATE on CPAP.  At that time she had been referred by her  Dr. Salvatore Valle MD to reestablish.  At that time her compliance was 100% for 7 hours and 50 minutes with a residual AHI of 3.2 and no significant leak.    Her 5/9/2016 sleep study showed an AHI of 14.4 with a maxwell saturation of 79%.  She had poor sleep efficiency and consequently did not meet the split night protocol.  She was empirically placed on auto titrating CPAP with room air.  A subsequent overnight oximetry on Pap revealed mild cyclic hypoxemia with saturations less than 88% for 91 minutes.    Today her 30-day compliance is      The patient reports improved symptoms using positive airway pressure.  Has experienced no significant problems with claustrophobia, nosebleeds, sore throat, dry eyes, mask fit, air leaks around the mask, pressure tolerance, excessive airway dryness, dry or runny nose, nasal congestion, facial irritation, aerophagia, or chest pain.     Significant comorbidities and modifying factors include former smoker, obesity, acquired hypothyroidism, hypertension, dyslipidemia, back pain, prior neck and back surgery, and prediabetes.  Patient Active Problem List    Diagnosis Date Noted    Degenerative arthritis of left knee 07/06/2023    Complex tear of medial meniscus of right knee as current injury 06/06/2023    Complex tear of medial meniscus of left knee as current injury 05/30/2023    Coronary artery disease involving native coronary artery of native heart without angina pectoris 05/16/2022    Increased BMI 08/02/2021    Former smoker 08/02/2021    Palpitations 05/05/2020    Acquired hypothyroidism 08/30/2018    Post menopausal syndrome 08/30/2018    Elevated d-dimer 08/30/2018    Left knee pain 05/27/2018    Leukocytosis 05/27/2018    S/P  "hip replacement 05/25/2018    Obstructive sleep apnea 05/16/2016    Muscle spasm of back 02/02/2016    Anxiety 01/06/2016    Chest pain, atypical 08/26/2013    SOB (shortness of breath) 08/26/2013    Fibromyalgia muscle pain 08/26/2013    Snoring 08/26/2013    Hypertensive cardiovascular disease 12/30/2011    Degeneration of cervical intervertebral disc     Dizziness     Hypertension     Lumbosacral ligament sprain        Past Medical History:   Diagnosis Date    Anxiety disorder 05/2018    not an issue at this time. 6/6/23-RESOLVED was related to meds.    Arthritis Last week with my mri of my knee    Back pain     Bowel habit changes     diarrhea    Bruxism     Chest pain, atypical 08/26/2013    Chickenpox     COVID-19 2020    Degeneration of cervical intervertebral disc     Diarrhea     Dizziness     Fibromyalgia     Fibromyalgia muscle pain 08/26/2013    Hard to intubate     I have a cerical cage at c4 was told the had to use a child’s breathing tube    Heart burn     High cholesterol     Hypertension     Hypertensive cardiovascular disease 12/30/2011    Hypothyroidism     Lumbosacral (joint) (ligament) sprain     Lumbosacral (joint) (ligament) sprain     Obesity     Pain 05/2018    all over    Pain 06/06/2023    to right hip and bilateral knees, and radiates down right leg    Painful joint     Sleep apnea     \"border line apnea\" uses CPAP     SOB (shortness of breath) 08/26/2013    panic attacks. 6/6/23-none for years.    Sore muscles     Swelling of lower extremity     Tonsillitis     Wears glasses         Past Surgical History:   Procedure Laterality Date    PB KNEE SCOPE,MED OR LAT MENIS REPAIR Right 6/14/2023    Procedure: Right Knee arthroscopy, partial medial meniscectomy, chondroplasty;  Surgeon: Yong Cisneros M.D.;  Location: SURGERY [facility];  Service: Orthopedics    VENTRAL HERNIA REPAIR ROBOTIC XI  09/30/2019    Procedure: REPAIR, HERNIA, VENTRAL, ROBOT-ASSISTED, USING DA FREDIS XI- EXTENDED VIEW " EXTRAPERITONEAL INCISIONAL HERNIA WITH MESH PLACEMENT;  Surgeon: Jamie Randolph M.D.;  Location: SURGERY [facility];  Service: General    VENTRAL HERNIA REPAIR ROBOTIC XI  2018    Procedure: VENTRAL HERNIA REPAIR ROBOTIC XI - INCISIONAL W/POSS MESH PLACEMENT;  Surgeon: Jamie Randolph M.D.;  Location: SURGERY [facility];  Service: General    HIP ARTH ANTERIOR TOTAL Left 2018    Procedure: HIP ARTHROPLASTY ANTERIOR TOTAL;  Surgeon: Valdez Ndiaye M.D.;  Location: SURGERY [facility];  Service: Orthopedics    CERVICAL DISK AND FUSION ANTERIOR  cervical vertebral fusion    C4 cage to C5    CERVICAL DISK AND FUSION ANTERIOR      C1 to C7    CHOLECYSTECTOMY      COLONOSCOPY      ENDOMETRIAL ABLATION      ablation     HIP ARTHROSCOPY Right     HIP ARTHROSCOPY Left     LAMINOTOMY      diskectomy L4, L5, S1    LUMBAR LAMINECTOMY DISKECTOMY      unsuccessful    OTHER NEUROLOGICAL SURG      hardware removal to lumbar. Got infected    OTHER NEUROLOGICAL SURG      Nerve root L4-L5 cleaned out    TONSILLECTOMY         Family History   Problem Relation Age of Onset    Alcohol abuse Father     Lung Disease Mother         Copd    Cancer Maternal Grandmother 94        nonhodgkins lymphoma    Alzheimer's Disease Paternal Grandfather     Asthma Brother         Grew out of it.       Social History     Socioeconomic History    Marital status:      Spouse name: Not on file    Number of children: Not on file    Years of education: Not on file    Highest education level: Associate degree: occupational, technical, or vocational program   Occupational History    Not on file   Tobacco Use    Smoking status: Former     Current packs/day: 0.00     Average packs/day: 1 pack/day for 10.0 years (10.0 ttl pk-yrs)     Types: Cigarettes     Start date: 1978     Quit date: 1988     Years since quittin.7    Smokeless tobacco: Never   Vaping Use    Vaping Use: Never used   Substance and Sexual Activity     Alcohol use: Yes     Alcohol/week: 1.2 oz     Types: 2 Glasses of wine per week    Drug use: No    Sexual activity: Not on file   Other Topics Concern    Not on file   Social History Narrative    Not on file     Social Determinants of Health     Financial Resource Strain: Unknown (7/17/2022)    Overall Financial Resource Strain (CARDIA)     Difficulty of Paying Living Expenses: Patient refused   Food Insecurity: Unknown (7/17/2022)    Hunger Vital Sign     Worried About Running Out of Food in the Last Year: Patient refused     Ran Out of Food in the Last Year: Patient refused   Transportation Needs: Unknown (7/17/2022)    PRAPARE - Transportation     Lack of Transportation (Medical): Patient refused     Lack of Transportation (Non-Medical): Patient refused   Physical Activity: Sufficiently Active (7/17/2022)    Exercise Vital Sign     Days of Exercise per Week: 7 days     Minutes of Exercise per Session: 30 min   Stress: Unknown (7/17/2022)    [employer] [place] [place] [employer] - Occupational Stress Questionnaire     Feeling of Stress : Patient refused   Social Connections: Unknown (7/17/2022)    Social Connection and Isolation Panel [NHANES]     Frequency of Communication with Friends and Family: Patient refused     Frequency of Social Gatherings with Friends and Family: Patient refused     Attends Christianity Services: Patient refused     Active Member of Clubs or Organizations: No     Attends Club or Organization Meetings: Patient refused     Marital Status:    Intimate Partner Violence: Not on file   Housing Stability: Unknown (7/17/2022)    Housing Stability Vital Sign     Unable to Pay for Housing in the Last Year: Patient refused     Number of Places Lived in the Last Year: Not on file     Unstable Housing in the Last Year: Patient refused       Current Outpatient Medications   Medication Sig Dispense Refill    Non Formulary Request Place  under the tongue 2 times a day. BIEST-estrogen only       "acetaminophen (TYLENOL) 325 MG Tab Take 325 mg by mouth every four hours as needed.      Cholecalciferol (VITAMIN D PO) Take 10,000 Units by mouth every day. Pt takes liquid      Melatonin 1 MG Tab Take 1 Tab by mouth every bedtime.      MAGNESIUM PO Take 2 Tabs by mouth 2 Times a Day.      B Complex Vitamins (VITAMIN-B COMPLEX PO) Take 1 Tab by mouth every day.      Ascorbic Acid (VITAMIN C PO) Take 1 Tab by mouth every day.      metoprolol SR (TOPROL XL) 50 MG TABLET SR 24 HR Take 50 mg by mouth every morning.      progesterone (PROMETRIUM) 200 MG capsule Take 200 mg by mouth every evening.      thyroid (ARMOUR THYROID) 60 MG Tab Take 60 mg by mouth every morning.       No current facility-administered medications for this visit.    \"CURRENT RX\"    ALLERGIES: Albumin, Amitriptyline, Aspirin, Banana, Eggs, Flexeril [cyclobenzaprine hcl], Gluten meal, Nsaids, Demerol, Diazepam, Gabapentin, Percocet [oxycodone-acetaminophen], Vicodin [hydrocodone-acetaminophen], Ultram [tramadol], and Lactose    ROS  ***  Constitutional: Denies fever, chills, sweats,  weight loss, fatigue  Cardiovascular: Denies chest pain, tightness, palpitations, swelling in legs/feet, fainting, difficulty breathing when lying down but gets better when sitting up.   Respiratory: Denies shortness of breath, cough, sputum, wheezing, painful breathing, coughing up blood.   Sleep: per HPI  Gastrointestinal: Denies  difficulty swallowing, nausea, abdominal pain, diarrhea, constipation, heartburn.  Musculoskeletal: Denies painful joints, sore muscles, back pain.        PHYSICAL EXAM  ***    LMP 12/03/2014   Appearance: Well-nourished, well-developed, no acute distress  Eyes:  PERRLA, EOMI  ENMT: without change  Neck: Supple, trachea midline, no masses  Respiratory effort:  No intercostal retractions or use of accessory muscles  Lung auscultation:  No wheezes rhonchi rubs or rales  Cardiac: No murmurs, rubs, or gallops; regular rhythm, normal rate; no " edema  Abdomen:  No tenderness, no organomegaly.  Musculoskeletal:  Grossly normal; gait and station normal; digits and nails normal  Skin:  No rashes, petechiae, cyanosis  Neurologic: without focal signs; oriented to person, time, place, and purpose; judgement intact  Psychiatric:  No depression, anxiety, agitation      Medical Decision Making       The medical record was reviewed in its entirety including the referral notes, records from primary care, consultants notes, hospital records, lab, imaging, microbiology, immunology, and immunizations.  Care gaps identified and reviewed with the patient.    Diagnostic and titration nocturnal polysomnogram's, home sleep apnea tests, continuous nocturnal oximetry results, multiple sleep latency tests, and compliance reports reviewed.  There are no diagnoses linked to this encounter.    PLAN:   Has been advised to continue the current ***, clean equipment frequently, and get new mask and supplies as allowed by insurance and DME. Advised about potential problems including nasal obstruction/stuffiness, mask leak or discomfort , frequent awakenings, chill or dryness of the upper airway, noise, inconvenience, and lack of benefit. Recommend an earlier appointment, if significant treatment barriers develop.    The risks of untreated sleep apnea were discussed with the patient at length. Patients with KATE are at increased risk of cardiovascular disease including systemic arterial hypertension, pulmonary arterial hypertension (transient or fixed), TIA, and an elevated risk of stroke, heart attack, sudden death, or arrhythmia between the hours of 11 PM and 6 AM. KATE patients have an increased risk of motor vehicle accidents, type 2 diabetes, GERD, repetitive mechanical trauma to pharyngeal muscles with inflammation and denervation, frequent EEG arousals leading to nonrestorative sleep, excessive daytime sleepiness, fatigue, depression, poor pain control, irritability, and lower  quality of life.  The patient was advised to avoid driving a motor vehicle when drowsy.    Positive airway pressure will favorably impact many of the adverse conditions and effects provoked by KATE.    Have advised the patient to follow up with the appropriate healthcare practitioners for all other medical problems and issues.    No follow-ups on file.    Total time 30 minutes

## 2023-09-15 ENCOUNTER — TELEPHONE (OUTPATIENT)
Dept: SCHEDULING | Facility: IMAGING CENTER | Age: 63
End: 2023-09-15

## 2023-09-16 DIAGNOSIS — G47.33 OBSTRUCTIVE SLEEP APNEA: ICD-10-CM

## 2023-09-18 ENCOUNTER — OFFICE VISIT (OUTPATIENT)
Dept: SLEEP MEDICINE | Facility: MEDICAL CENTER | Age: 63
End: 2023-09-18
Attending: INTERNAL MEDICINE
Payer: COMMERCIAL

## 2023-10-29 SDOH — ECONOMIC STABILITY: HOUSING INSECURITY
IN THE LAST 12 MONTHS, WAS THERE A TIME WHEN YOU DID NOT HAVE A STEADY PLACE TO SLEEP OR SLEPT IN A SHELTER (INCLUDING NOW)?: NO

## 2023-10-29 SDOH — ECONOMIC STABILITY: FOOD INSECURITY: WITHIN THE PAST 12 MONTHS, THE FOOD YOU BOUGHT JUST DIDN'T LAST AND YOU DIDN'T HAVE MONEY TO GET MORE.: NEVER TRUE

## 2023-10-29 SDOH — ECONOMIC STABILITY: FOOD INSECURITY: WITHIN THE PAST 12 MONTHS, YOU WORRIED THAT YOUR FOOD WOULD RUN OUT BEFORE YOU GOT MONEY TO BUY MORE.: NEVER TRUE

## 2023-10-29 SDOH — HEALTH STABILITY: PHYSICAL HEALTH: ON AVERAGE, HOW MANY DAYS PER WEEK DO YOU ENGAGE IN MODERATE TO STRENUOUS EXERCISE (LIKE A BRISK WALK)?: 0 DAYS

## 2023-10-29 SDOH — HEALTH STABILITY: PHYSICAL HEALTH: ON AVERAGE, HOW MANY MINUTES DO YOU ENGAGE IN EXERCISE AT THIS LEVEL?: 0 MIN

## 2023-10-29 SDOH — ECONOMIC STABILITY: TRANSPORTATION INSECURITY
IN THE PAST 12 MONTHS, HAS LACK OF TRANSPORTATION KEPT YOU FROM MEETINGS, WORK, OR FROM GETTING THINGS NEEDED FOR DAILY LIVING?: NO

## 2023-10-29 SDOH — ECONOMIC STABILITY: INCOME INSECURITY: IN THE LAST 12 MONTHS, WAS THERE A TIME WHEN YOU WERE NOT ABLE TO PAY THE MORTGAGE OR RENT ON TIME?: NO

## 2023-10-29 SDOH — ECONOMIC STABILITY: INCOME INSECURITY: HOW HARD IS IT FOR YOU TO PAY FOR THE VERY BASICS LIKE FOOD, HOUSING, MEDICAL CARE, AND HEATING?: NOT HARD AT ALL

## 2023-10-29 SDOH — HEALTH STABILITY: MENTAL HEALTH
STRESS IS WHEN SOMEONE FEELS TENSE, NERVOUS, ANXIOUS, OR CAN'T SLEEP AT NIGHT BECAUSE THEIR MIND IS TROUBLED. HOW STRESSED ARE YOU?: NOT AT ALL

## 2023-10-29 SDOH — ECONOMIC STABILITY: HOUSING INSECURITY: IN THE LAST 12 MONTHS, HOW MANY PLACES HAVE YOU LIVED?: 1

## 2023-10-29 SDOH — ECONOMIC STABILITY: TRANSPORTATION INSECURITY
IN THE PAST 12 MONTHS, HAS LACK OF RELIABLE TRANSPORTATION KEPT YOU FROM MEDICAL APPOINTMENTS, MEETINGS, WORK OR FROM GETTING THINGS NEEDED FOR DAILY LIVING?: NO

## 2023-10-29 SDOH — ECONOMIC STABILITY: TRANSPORTATION INSECURITY
IN THE PAST 12 MONTHS, HAS THE LACK OF TRANSPORTATION KEPT YOU FROM MEDICAL APPOINTMENTS OR FROM GETTING MEDICATIONS?: NO

## 2023-10-29 ASSESSMENT — SOCIAL DETERMINANTS OF HEALTH (SDOH)
DO YOU BELONG TO ANY CLUBS OR ORGANIZATIONS SUCH AS CHURCH GROUPS UNIONS, FRATERNAL OR ATHLETIC GROUPS, OR SCHOOL GROUPS?: NO
HOW OFTEN DO YOU ATTENT MEETINGS OF THE CLUB OR ORGANIZATION YOU BELONG TO?: PATIENT DECLINED
HOW OFTEN DO YOU ATTEND CHURCH OR RELIGIOUS SERVICES?: 1 TO 4 TIMES PER YEAR
HOW HARD IS IT FOR YOU TO PAY FOR THE VERY BASICS LIKE FOOD, HOUSING, MEDICAL CARE, AND HEATING?: NOT HARD AT ALL
IN A TYPICAL WEEK, HOW MANY TIMES DO YOU TALK ON THE PHONE WITH FAMILY, FRIENDS, OR NEIGHBORS?: MORE THAN THREE TIMES A WEEK
HOW MANY DRINKS CONTAINING ALCOHOL DO YOU HAVE ON A TYPICAL DAY WHEN YOU ARE DRINKING: 1 OR 2
HOW OFTEN DO YOU GET TOGETHER WITH FRIENDS OR RELATIVES?: ONCE A WEEK
IN A TYPICAL WEEK, HOW MANY TIMES DO YOU TALK ON THE PHONE WITH FAMILY, FRIENDS, OR NEIGHBORS?: MORE THAN THREE TIMES A WEEK
WITHIN THE PAST 12 MONTHS, YOU WORRIED THAT YOUR FOOD WOULD RUN OUT BEFORE YOU GOT THE MONEY TO BUY MORE: NEVER TRUE
HOW OFTEN DO YOU GET TOGETHER WITH FRIENDS OR RELATIVES?: ONCE A WEEK
HOW OFTEN DO YOU ATTENT MEETINGS OF THE CLUB OR ORGANIZATION YOU BELONG TO?: PATIENT DECLINED
HOW OFTEN DO YOU HAVE SIX OR MORE DRINKS ON ONE OCCASION: NEVER
DO YOU BELONG TO ANY CLUBS OR ORGANIZATIONS SUCH AS CHURCH GROUPS UNIONS, FRATERNAL OR ATHLETIC GROUPS, OR SCHOOL GROUPS?: NO
HOW OFTEN DO YOU ATTEND CHURCH OR RELIGIOUS SERVICES?: 1 TO 4 TIMES PER YEAR
HOW OFTEN DO YOU HAVE A DRINK CONTAINING ALCOHOL: 2-3 TIMES A WEEK

## 2023-10-29 ASSESSMENT — LIFESTYLE VARIABLES
HOW OFTEN DO YOU HAVE SIX OR MORE DRINKS ON ONE OCCASION: NEVER
AUDIT-C TOTAL SCORE: 3
HOW MANY STANDARD DRINKS CONTAINING ALCOHOL DO YOU HAVE ON A TYPICAL DAY: 1 OR 2
SKIP TO QUESTIONS 9-10: 1
HOW OFTEN DO YOU HAVE A DRINK CONTAINING ALCOHOL: 2-3 TIMES A WEEK

## 2023-11-01 ENCOUNTER — OFFICE VISIT (OUTPATIENT)
Dept: MEDICAL GROUP | Facility: CLINIC | Age: 63
End: 2023-11-01
Payer: COMMERCIAL

## 2023-11-01 VITALS
TEMPERATURE: 97.5 F | HEART RATE: 68 BPM | BODY MASS INDEX: 35.94 KG/M2 | DIASTOLIC BLOOD PRESSURE: 78 MMHG | WEIGHT: 210.5 LBS | OXYGEN SATURATION: 97 % | SYSTOLIC BLOOD PRESSURE: 124 MMHG | HEIGHT: 64 IN

## 2023-11-01 DIAGNOSIS — L57.0 ACTINIC KERATOSES: ICD-10-CM

## 2023-11-01 DIAGNOSIS — S33.9XXS SPRAIN OF LIGAMENT OF LUMBOSACRAL JOINT, SEQUELA: ICD-10-CM

## 2023-11-01 DIAGNOSIS — Z00.00 HEALTH CARE MAINTENANCE: ICD-10-CM

## 2023-11-01 DIAGNOSIS — G89.29 CHRONIC PAIN OF LEFT KNEE: ICD-10-CM

## 2023-11-01 DIAGNOSIS — M79.7 FIBROMYALGIA MUSCLE PAIN: ICD-10-CM

## 2023-11-01 DIAGNOSIS — G47.33 OBSTRUCTIVE SLEEP APNEA: ICD-10-CM

## 2023-11-01 DIAGNOSIS — I25.10 CORONARY ARTERY DISEASE INVOLVING NATIVE CORONARY ARTERY OF NATIVE HEART WITHOUT ANGINA PECTORIS: ICD-10-CM

## 2023-11-01 DIAGNOSIS — M25.562 CHRONIC PAIN OF LEFT KNEE: ICD-10-CM

## 2023-11-01 PROCEDURE — 99214 OFFICE O/P EST MOD 30 MIN: CPT | Performed by: FAMILY MEDICINE

## 2023-11-01 PROCEDURE — 3074F SYST BP LT 130 MM HG: CPT | Performed by: FAMILY MEDICINE

## 2023-11-01 PROCEDURE — 3078F DIAST BP <80 MM HG: CPT | Performed by: FAMILY MEDICINE

## 2023-11-01 ASSESSMENT — FIBROSIS 4 INDEX: FIB4 SCORE: 1.49

## 2023-11-01 NOTE — PROGRESS NOTES
Subjective:     CC: Wellness, Referrals    HPI:   Senia presents today to discuss the following issues     Problem   Health Care Maintenance    Screening recommendations are reviewed.  Pap was in 2020 and reports it was normal.  She reports a recent mammogram which was normal.  She reports colonoscopy done recently and normal.  Needs no medications refilled today.  Had labs done by other doctors and does not feel the need to repeat those now.     Coronary Artery Disease Involving Native Coronary Artery of Native Heart Without Angina Pectoris    Formatting of this note might be different from the original.  10 to 20% mid LAD stenosis on angiogram September 2016    Patient believes diagnosis of angina is uncertain, and she is asymptomatic now.  She would like follow-up in general with cardiology though and request Dr. Edwin calle.       Left Knee Pain    Chronic knee pain and problems which sound like they are arthritic.  Surgery has been recommended but she has been holding off.  She would like to review her options with an orthopedist.  She request Dr. Lorenzo.     Obstructive Sleep Apnea    Has a CPAP and needs follow-up.  She request follow-up with Dr. Queen and pulmonology.     Fibromyalgia Muscle Pain    Senia has fairly poorly controlled pain related to her fibromyalgia.  She did see an allergist today and some new medicines and recommendations were made.  She is hopeful that will help.     Lumbosacral Ligament Sprain    She has chronic ongoing back pain.  Request referral back to pain management and would like to see Dr. Onur Rolon.         Current Outpatient Medications Ordered in Epic   Medication Sig Dispense Refill    Non Formulary Request Place  under the tongue 2 times a day. BIEST-estrogen only      Cholecalciferol (VITAMIN D PO) Take 10,000 Units by mouth every day. Pt takes liquid      Melatonin 1 MG Tab Take 1 Tab by mouth every bedtime.      MAGNESIUM PO Take 2 Tabs by mouth 2 Times a Day.  "     B Complex Vitamins (VITAMIN-B COMPLEX PO) Take 1 Tab by mouth every day.      Ascorbic Acid (VITAMIN C PO) Take 1 Tab by mouth every day.      metoprolol SR (TOPROL XL) 50 MG TABLET SR 24 HR Take 50 mg by mouth every morning.      progesterone (PROMETRIUM) 200 MG capsule Take 200 mg by mouth every evening.      thyroid (ARMOUR THYROID) 60 MG Tab Take 60 mg by mouth every morning.      acetaminophen (TYLENOL) 325 MG Tab Take 325 mg by mouth every four hours as needed.       No current Epic-ordered facility-administered medications on file.       Health Maintenance:     ROS:  Gen: no fevers/chills, no changes in weight  Eyes: no changes in vision  ENT: no sore throat, no hearing loss, no bloody nose  Pulm: no sob, no cough  CV: no chest pain, no palpitations  GI: no nausea/vomiting, no diarrhea  : no dysuria  MSk: no myalgias  Skin: no rash  Neuro: no headaches, no numbness/tingling  Heme/Lymph: no easy bruising      Objective:     Exam:  /78 (BP Location: Left arm, Patient Position: Sitting, BP Cuff Size: Adult)   Pulse 68   Temp 36.4 °C (97.5 °F) (Temporal)   Ht 1.626 m (5' 4\")   Wt 95.5 kg (210 lb 8 oz)   LMP 12/03/2014   SpO2 97%   BMI 36.13 kg/m²  Body mass index is 36.13 kg/m².    Gen: Alert and oriented, No apparent distress.  Neck: Neck is supple without lymphadenopathy.  Lungs: Normal effort, CTA bilaterally, no wheezes, rhonchi, or rales  CV: Regular rate and rhythm. No murmurs, rubs, or gallops.  Ext: No clubbing, cyanosis, edema.          Assessment & Plan:     63 y.o. female with the following -     Problem List Items Addressed This Visit       Lumbosacral ligament sprain     Referral is placed.         Relevant Orders    Referral to Pain Management    Fibromyalgia muscle pain     He has not done with the Cymbalta, and has \"tried everything else\".  Defer this to pain management and the allergist at this point but may be asked to weigh in later.         Obstructive sleep apnea     " Referral is placed.         Relevant Orders    Referral to Pulmonary and Sleep Medicine    Left knee pain     Referral is placed.         Relevant Orders    Referral to Orthopedics    Coronary artery disease involving native coronary artery of native heart without angina pectoris     Referral is placed.         Relevant Orders    REFERRAL TO CARDIOLOGY    Health care maintenance     I did not request for record release for the colonoscopy we will we will do that next visit.  Continue to offer appropriate screening and preventive measures.          Other Visit Diagnoses       Actinic keratoses        Relevant Orders    Referral to Dermatology            I spent a total of 30 minutes with record review, exam, communication with the patient, communication with other providers, and documentation of this encounter.      No follow-ups on file.

## 2023-11-02 NOTE — ASSESSMENT & PLAN NOTE
I did not request for record release for the colonoscopy we will we will do that next visit.  Continue to offer appropriate screening and preventive measures.

## 2023-11-02 NOTE — ASSESSMENT & PLAN NOTE
"He has not done with the Cymbalta, and has \"tried everything else\".  Defer this to pain management and the allergist at this point but may be asked to weigh in later.  "

## 2023-11-08 ENCOUNTER — TELEPHONE (OUTPATIENT)
Dept: HEALTH INFORMATION MANAGEMENT | Facility: OTHER | Age: 63
End: 2023-11-08
Payer: COMMERCIAL

## 2023-11-29 ENCOUNTER — HOSPITAL ENCOUNTER (OUTPATIENT)
Facility: MEDICAL CENTER | Age: 63
End: 2023-11-29
Attending: FAMILY MEDICINE
Payer: COMMERCIAL

## 2023-11-29 DIAGNOSIS — R30.0 DYSURIA: ICD-10-CM

## 2023-11-29 NOTE — PROGRESS NOTES
called re sx's of dysuria, abd pain. I ordered a u/a and will treat pending results. We will schedule her w first available doc here as well

## 2023-11-30 ENCOUNTER — HOSPITAL ENCOUNTER (OUTPATIENT)
Facility: MEDICAL CENTER | Age: 63
End: 2023-11-30
Attending: FAMILY MEDICINE
Payer: COMMERCIAL

## 2023-11-30 DIAGNOSIS — R30.0 DYSURIA: ICD-10-CM

## 2023-11-30 LAB
APPEARANCE UR: CLEAR
BILIRUB UR QL STRIP.AUTO: NEGATIVE
COLOR UR: YELLOW
GLUCOSE UR STRIP.AUTO-MCNC: NEGATIVE MG/DL
KETONES UR STRIP.AUTO-MCNC: NEGATIVE MG/DL
LEUKOCYTE ESTERASE UR QL STRIP.AUTO: NEGATIVE
MICRO URNS: NORMAL
NITRITE UR QL STRIP.AUTO: NEGATIVE
PH UR STRIP.AUTO: 6.5 [PH] (ref 5–8)
PROT UR QL STRIP: NEGATIVE MG/DL
RBC UR QL AUTO: NEGATIVE
SP GR UR STRIP.AUTO: 1.01
UROBILINOGEN UR STRIP.AUTO-MCNC: 0.2 MG/DL

## 2023-11-30 PROCEDURE — 81003 URINALYSIS AUTO W/O SCOPE: CPT

## 2023-12-29 ENCOUNTER — APPOINTMENT (OUTPATIENT)
Dept: SLEEP MEDICINE | Facility: MEDICAL CENTER | Age: 63
End: 2023-12-29
Attending: FAMILY MEDICINE
Payer: COMMERCIAL

## 2024-01-22 NOTE — PROGRESS NOTES
CC: Follow-up for KATE on APAP 5-12 CWP        HPI:  Senia Valle is a 63 y.o.female  kindly referred by Stephon Rodríguez M.D. and last seen by Dr. DELGADILLO on 8/2/2021 when a new machine was ordered for APAP 5-10 CWP.  At the time of her last visit her AHI was normalized 3.1.  She was not seen back here afterwards as her insurance was not accepted.    Currently she is using her device 100% last 30 days for 7 hours and 38 minutes.  She is currently on APAP 5-12 CWP and her residual estimated apnea-hypopnea index with is 0.4.    Has a Respironics machine but the download shows that her 's name is on it.      Her 5/9/2016 sleep study showed an AHI of 14.4 with a maxwell saturation of 79%. She had poor sleep efficiency and consequently did not meet the split night protocol. She was empirically placed on auto titrating CPAP with room air. A subsequent overnight oximetry on Pap revealed mild cyclic hypoxemia with saturations less than 88% for 91 minutes.     The patient reports improved symptoms using positive airway pressure.  Has experienced no significant problems with claustrophobia, nosebleeds, sore throat, dry eyes, mask fit, air leaks around the mask, pressure tolerance, excessive airway dryness, dry or runny nose, nasal congestion, facial irritation, aerophagia, or chest pain.     Past medical history significant include left knee DJD, former smoker, obesity, acquired hypothyroidism, hypertension, dyslipidemia, back pain, prior neck and back surgery, and prediabetes.         Patient Active Problem List    Diagnosis Date Noted    Health care maintenance 11/01/2023    Degenerative arthritis of left knee 07/06/2023    Complex tear of medial meniscus of right knee as current injury 06/06/2023    Complex tear of medial meniscus of left knee as current injury 05/30/2023    Coronary artery disease involving native coronary artery of native heart without angina pectoris 05/16/2022    Increased BMI 08/02/2021     "Former smoker 08/02/2021    Palpitations 05/05/2020    Acquired hypothyroidism 08/30/2018    Post menopausal syndrome 08/30/2018    Elevated d-dimer 08/30/2018    Left knee pain 05/27/2018    Leukocytosis 05/27/2018    S/P hip replacement 05/25/2018    Obstructive sleep apnea 05/16/2016    Muscle spasm of back 02/02/2016    Anxiety 01/06/2016    Chest pain, atypical 08/26/2013    SOB (shortness of breath) 08/26/2013    Fibromyalgia muscle pain 08/26/2013    Snoring 08/26/2013    Hypertensive cardiovascular disease 12/30/2011    Degeneration of cervical intervertebral disc     Dizziness     Hypertension     Lumbosacral ligament sprain        Past Medical History:   Diagnosis Date    Anxiety disorder 05/2018    not an issue at this time. 6/6/23-RESOLVED was related to meds.    Arthritis Last week with my mri of my knee    Back pain     Bowel habit changes     diarrhea    Bruxism     Chest pain, atypical 08/26/2013    Chickenpox     COVID-19 2020    Degeneration of cervical intervertebral disc     Diarrhea     Dizziness     Fibromyalgia     Fibromyalgia muscle pain 08/26/2013    Hard to intubate     I have a cerical cage at c4 was told the had to use a child’s breathing tube    Heart burn     High cholesterol     Hypertension     Hypertensive cardiovascular disease 12/30/2011    Hypothyroidism     Lumbosacral (joint) (ligament) sprain     Lumbosacral (joint) (ligament) sprain     Obesity     Pain 05/2018    all over    Pain 06/06/2023    to right hip and bilateral knees, and radiates down right leg    Painful joint     Sleep apnea     \"border line apnea\" uses CPAP     SOB (shortness of breath) 08/26/2013    panic attacks. 6/6/23-none for years.    Sore muscles     Swelling of lower extremity     Tonsillitis     Wears glasses         Past Surgical History:   Procedure Laterality Date    PB KNEE SCOPE,MED OR LAT MENIS REPAIR Right 6/14/2023    Procedure: Right Knee arthroscopy, partial medial meniscectomy, chondroplasty; "  Surgeon: Yong Cisneros M.D.;  Location: SURGERY [facility];  Service: Orthopedics    VENTRAL HERNIA REPAIR ROBOTIC XI  09/30/2019    Procedure: REPAIR, HERNIA, VENTRAL, ROBOT-ASSISTED, USING DA FREDIS XI- EXTENDED VIEW EXTRAPERITONEAL INCISIONAL HERNIA WITH MESH PLACEMENT;  Surgeon: Jamie Randolph M.D.;  Location: SURGERY [facility];  Service: General    VENTRAL HERNIA REPAIR ROBOTIC XI  12/03/2018    Procedure: VENTRAL HERNIA REPAIR ROBOTIC XI - INCISIONAL W/POSS MESH PLACEMENT;  Surgeon: Jamie Randolph M.D.;  Location: SURGERY [facility];  Service: General    HIP ARTH ANTERIOR TOTAL Left 05/25/2018    Procedure: HIP ARTHROPLASTY ANTERIOR TOTAL;  Surgeon: Valdez Ndiaye M.D.;  Location: SURGERY [facility];  Service: Orthopedics    CERVICAL DISK AND FUSION ANTERIOR  cervical vertebral fusion    C4 cage to C5    CERVICAL DISK AND FUSION ANTERIOR      C1 to C7    CHOLECYSTECTOMY      COLONOSCOPY      ENDOMETRIAL ABLATION      ablation     HIP ARTHROSCOPY Right     HIP ARTHROSCOPY Left     LAMINOTOMY      diskectomy L4, L5, S1    LUMBAR LAMINECTOMY DISKECTOMY      unsuccessful    OTHER NEUROLOGICAL SURG      hardware removal to lumbar. Got infected    OTHER NEUROLOGICAL SURG      Nerve root L4-L5 cleaned out    TONSILLECTOMY         Family History   Problem Relation Age of Onset    Alcohol abuse Father     Lung Disease Mother         Copd    Cancer Maternal Grandmother 94        nonhodgkins lymphoma    Alzheimer's Disease Paternal Grandfather     Asthma Brother         Grew out of it.       Social History     Socioeconomic History    Marital status:      Spouse name: Not on file    Number of children: Not on file    Years of education: Not on file    Highest education level: Bachelor's degree (e.g., BA, AB, BS)   Occupational History    Not on file   Tobacco Use    Smoking status: Former     Current packs/day: 0.00     Average packs/day: 1 pack/day for 10.0 years (10.0 ttl pk-yrs)     Types:  Cigarettes     Start date: 1978     Quit date: 1988     Years since quittin.0    Smokeless tobacco: Never   Vaping Use    Vaping Use: Never used   Substance and Sexual Activity    Alcohol use: Yes     Alcohol/week: 1.2 oz     Types: 2 Glasses of wine per week    Drug use: No    Sexual activity: Not on file   Other Topics Concern    Not on file   Social History Narrative    Not on file     Social Determinants of Health     Financial Resource Strain: Low Risk  (10/29/2023)    Overall Financial Resource Strain (CARDIA)     Difficulty of Paying Living Expenses: Not hard at all   Food Insecurity: No Food Insecurity (10/29/2023)    Hunger Vital Sign     Worried About Running Out of Food in the Last Year: Never true     Ran Out of Food in the Last Year: Never true   Transportation Needs: No Transportation Needs (10/29/2023)    PRAPARE - Transportation     Lack of Transportation (Medical): No     Lack of Transportation (Non-Medical): No   Physical Activity: Inactive (10/29/2023)    Exercise Vital Sign     Days of Exercise per Week: 0 days     Minutes of Exercise per Session: 0 min   Stress: No Stress Concern Present (10/29/2023)    [employer] [employer] - Occupational Stress Questionnaire     Feeling of Stress : Not at all   Social Connections: Moderately Integrated (10/29/2023)    Social Connection and Isolation Panel [NHANES]     Frequency of Communication with Friends and Family: More than three times a week     Frequency of Social Gatherings with Friends and Family: Once a week     Attends Evangelical Services: 1 to 4 times per year     Active Member of Clubs or Organizations: No     Attends Club or Organization Meetings: Patient refused     Marital Status:    Intimate Partner Violence: Not on file   Housing Stability: Low Risk  (10/29/2023)    Housing Stability Vital Sign     Unable to Pay for Housing in the Last Year: No     Number of Places Lived in the Last Year: 1     Unstable  "Housing in the Last Year: No       Current Outpatient Medications   Medication Sig Dispense Refill    Cholecalciferol (VITAMIN D PO) Take 10,000 Units by mouth every day. Pt takes liquid      Melatonin 1 MG Tab Take 1 Tab by mouth every bedtime.      MAGNESIUM PO Take 2 Tabs by mouth 2 Times a Day.      B Complex Vitamins (VITAMIN-B COMPLEX PO) Take 1 Tab by mouth every day.      Ascorbic Acid (VITAMIN C PO) Take 1 Tab by mouth every day.      metoprolol SR (TOPROL XL) 50 MG TABLET SR 24 HR Take 50 mg by mouth every morning.      progesterone (PROMETRIUM) 200 MG capsule Take 200 mg by mouth every evening.      thyroid (ARMOUR THYROID) 60 MG Tab Take 60 mg by mouth every morning.      Non Formulary Request Place  under the tongue 2 times a day. BIEST-estrogen only      acetaminophen (TYLENOL) 325 MG Tab Take 325 mg by mouth every four hours as needed.       No current facility-administered medications for this visit.    \"CURRENT RX\"    ALLERGIES: Albumin, Amitriptyline, Aspirin, Banana, Eggs, Flexeril [cyclobenzaprine hcl], Gluten meal, Nsaids, Demerol, Diazepam, Gabapentin, Percocet [oxycodone-acetaminophen], Vicodin [hydrocodone-acetaminophen], Ultram [tramadol], and Lactose    ROS    Constitutional: Denies fever, chills, sweats,  weight loss, fatigue  Cardiovascular: Denies chest pain, tightness, palpitations, swelling in legs/feet, fainting, difficulty breathing when lying down but gets better when sitting up.   Respiratory: Denies shortness of breath, cough, sputum, wheezing, painful breathing, coughing up blood.   Sleep: per HPI  Gastrointestinal: Denies  difficulty swallowing, nausea, abdominal pain, diarrhea, constipation, heartburn.  Musculoskeletal: Denies painful joints, sore muscles, back pain.        PHYSICAL EXAM      /84 (BP Location: Left arm, Patient Position: Sitting, BP Cuff Size: Adult)   Pulse 64   Resp 14   Ht 1.626 m (5' 4\")   Wt 93.4 kg (206 lb)   LMP 12/03/2014   SpO2 96%   BMI " 35.36 kg/m²   Appearance: Well-nourished, well-developed, no acute distress  Eyes:  PERRLA, EOMI  ENMT: without change  Neck: Supple, trachea midline, no masses  Respiratory effort:  No intercostal retractions or use of accessory muscles  Lung auscultation:  No wheezes rhonchi rubs or rales  Cardiac: No murmurs, rubs, or gallops; regular rhythm, normal rate; no edema  Abdomen:  No tenderness, no organomegaly.  Musculoskeletal:  Grossly normal; gait and station normal; digits and nails normal  Skin:  No rashes, petechiae, cyanosis  Neurologic: without focal signs; oriented to person, time, place, and purpose; judgement intact  Psychiatric:  No depression, anxiety, agitation      Medical Decision Making       The medical record was reviewed in its entirety including the referral notes, records from primary care, consultants notes, hospital records, lab, imaging, microbiology, immunology, and immunizations.  Care gaps identified and reviewed with the patient.    Diagnostic and titration nocturnal polysomnogram's, home sleep apnea tests, continuous nocturnal oximetry results, multiple sleep latency tests, and compliance reports reviewed.  1. Obstructive sleep apnea  - DME Mask and Supplies      PLAN:   Has been advised to continue the current APAP 5-12 CWP, clean equipment frequently, and get new mask and supplies as allowed by insurance and DME. Advised about potential problems including nasal obstruction/stuffiness, mask leak or discomfort , frequent awakenings, chill or dryness of the upper airway, noise, inconvenience, and lack of benefit. Recommend an earlier appointment, if significant treatment barriers develop.    The risks of untreated sleep apnea were discussed with the patient at length. Patients with KATE are at increased risk of cardiovascular disease including systemic arterial hypertension, pulmonary arterial hypertension (transient or fixed), TIA, and an elevated risk of stroke, heart attack, sudden  death, or arrhythmia between the hours of 11 PM and 6 AM. KATE patients have an increased risk of motor vehicle accidents, type 2 diabetes, GERD, repetitive mechanical trauma to pharyngeal muscles with inflammation and denervation, frequent EEG arousals leading to nonrestorative sleep, excessive daytime sleepiness, fatigue, depression, poor pain control, irritability, and lower quality of life.  The patient was advised to avoid driving a motor vehicle when drowsy.    Positive airway pressure will favorably impact many of the adverse conditions and effects provoked by KATE.    Have advised the patient to follow up with the appropriate healthcare practitioners for all other medical problems and issues.    Return in about 1 year (around 1/24/2025).    Total time 30 minutes    The download for her Respironics machine actually has her 's name on the report.  She is going to straighten this out and if necessary bring her machine in for a new card format.

## 2024-01-24 ENCOUNTER — OFFICE VISIT (OUTPATIENT)
Dept: SLEEP MEDICINE | Facility: MEDICAL CENTER | Age: 64
End: 2024-01-24
Attending: INTERNAL MEDICINE
Payer: COMMERCIAL

## 2024-01-24 VITALS
OXYGEN SATURATION: 96 % | HEIGHT: 64 IN | HEART RATE: 64 BPM | RESPIRATION RATE: 14 BRPM | DIASTOLIC BLOOD PRESSURE: 84 MMHG | BODY MASS INDEX: 35.17 KG/M2 | SYSTOLIC BLOOD PRESSURE: 128 MMHG | WEIGHT: 206 LBS

## 2024-01-24 DIAGNOSIS — G47.33 OBSTRUCTIVE SLEEP APNEA: ICD-10-CM

## 2024-01-24 DIAGNOSIS — G47.09 OTHER INSOMNIA: ICD-10-CM

## 2024-01-24 PROCEDURE — 3079F DIAST BP 80-89 MM HG: CPT | Performed by: INTERNAL MEDICINE

## 2024-01-24 PROCEDURE — 99214 OFFICE O/P EST MOD 30 MIN: CPT | Performed by: INTERNAL MEDICINE

## 2024-01-24 PROCEDURE — 3074F SYST BP LT 130 MM HG: CPT | Performed by: INTERNAL MEDICINE

## 2024-01-24 PROCEDURE — 99213 OFFICE O/P EST LOW 20 MIN: CPT | Performed by: INTERNAL MEDICINE

## 2024-01-24 ASSESSMENT — FIBROSIS 4 INDEX: FIB4 SCORE: 1.49

## 2024-01-31 ENCOUNTER — TELEPHONE (OUTPATIENT)
Dept: CARDIOLOGY | Facility: MEDICAL CENTER | Age: 64
End: 2024-01-31
Payer: COMMERCIAL

## 2024-01-31 NOTE — TELEPHONE ENCOUNTER
Called patient in regards to records for NP appointment with Dr. SHARMA To review most recent lab results, ekg, any cardiac testing or ,if patient has been treated by a cardiologist. No answer, left voicemail to call back

## 2024-02-01 NOTE — TELEPHONE ENCOUNTER
Caller: Senia Valle     Topic/issue:Pt has not had any recent testing or labs in the past but was a pt of Dr. Layton about 2 years ago and a pt of SABINA in 2020.    EKG was done in 06/2023 right before knee Surgery    Callback Number: [phone] (home)       Thank you    Flory GO

## 2024-02-06 ENCOUNTER — OFFICE VISIT (OUTPATIENT)
Dept: CARDIOLOGY | Facility: MEDICAL CENTER | Age: 64
End: 2024-02-06
Attending: FAMILY MEDICINE
Payer: COMMERCIAL

## 2024-02-06 VITALS
OXYGEN SATURATION: 94 % | WEIGHT: 208 LBS | SYSTOLIC BLOOD PRESSURE: 118 MMHG | DIASTOLIC BLOOD PRESSURE: 76 MMHG | BODY MASS INDEX: 35.51 KG/M2 | HEIGHT: 64 IN | RESPIRATION RATE: 16 BRPM | HEART RATE: 73 BPM

## 2024-02-06 DIAGNOSIS — E66.9 OBESITY (BMI 30-39.9): ICD-10-CM

## 2024-02-06 DIAGNOSIS — I10 ESSENTIAL HYPERTENSION: ICD-10-CM

## 2024-02-06 DIAGNOSIS — E78.5 DYSLIPIDEMIA: ICD-10-CM

## 2024-02-06 LAB — EKG IMPRESSION: NORMAL

## 2024-02-06 PROCEDURE — 93010 ELECTROCARDIOGRAM REPORT: CPT | Performed by: INTERNAL MEDICINE

## 2024-02-06 PROCEDURE — 3078F DIAST BP <80 MM HG: CPT | Performed by: INTERNAL MEDICINE

## 2024-02-06 PROCEDURE — 3074F SYST BP LT 130 MM HG: CPT | Performed by: INTERNAL MEDICINE

## 2024-02-06 PROCEDURE — 93005 ELECTROCARDIOGRAM TRACING: CPT | Performed by: INTERNAL MEDICINE

## 2024-02-06 PROCEDURE — 99212 OFFICE O/P EST SF 10 MIN: CPT | Performed by: INTERNAL MEDICINE

## 2024-02-06 PROCEDURE — 99204 OFFICE O/P NEW MOD 45 MIN: CPT | Performed by: INTERNAL MEDICINE

## 2024-02-06 RX ORDER — AMLODIPINE BESYLATE 5 MG/1
5 TABLET ORAL DAILY
Qty: 90 TABLET | Refills: 3 | Status: SHIPPED | OUTPATIENT
Start: 2024-02-06

## 2024-02-06 ASSESSMENT — ENCOUNTER SYMPTOMS
INSOMNIA: 1
HEARTBURN: 1
BACK PAIN: 1
BLURRED VISION: 0
MYALGIAS: 0
MEMORY LOSS: 1
PALPITATIONS: 0
DIZZINESS: 0
CLAUDICATION: 1
CHILLS: 0
PND: 0
ABDOMINAL PAIN: 0
NECK PAIN: 1
FEVER: 0
LOSS OF CONSCIOUSNESS: 0
DIARRHEA: 1
SHORTNESS OF BREATH: 0
ORTHOPNEA: 0

## 2024-02-06 ASSESSMENT — FIBROSIS 4 INDEX: FIB4 SCORE: 1.49

## 2024-02-06 NOTE — PROGRESS NOTES
Chief Complaint   Patient presents with    Chest Pain     F/V Dx: Chest pain, atypical       Subjective     Senia Valle is a 63 y.o. female who presents today to establish ongoing cardiovascular care.    The patient has a history of nonobstructive coronary artery disease (10% narrowing) [facility] 2016 at [facility] after being hospitalized for chest pain with an abnormal MPI, hypertension, dyslipidemia, fibromyalgia, inflammatory syndrome with prior elevated JAMIN and CRP yet to be defined, GERD, hypothyroidism, multiple orthopedic and spinal surgeries (knee, hip, C-spine, lumbar laminectomy) with chronic diffuse with chronic diffuse pain.    She had previously been a patient with [facility] but more recently followed at [facility] cardiology due to insurance reasons.    She is having no specific cardiac symptoms or problems such as exertional angina or exertional shortness of breath.  She does report some fleeting nonexertional chest pain which is chronic.  Despite her chronic pain she does walk every day limited by joint and leg pain.  She has been on chronic metoprolol for what she says is for hypertension hypertension as opposed to coronary artery disease.  She is interested in getting off metoprolol having read it can impede weight loss and cause weight gain.    Past Medical History:   Diagnosis Date    Anxiety disorder 05/2018    not an issue at this time. 6/6/23-RESOLVED was related to meds.    Arthritis Last week with my mri of my knee    Back pain     Bowel habit changes     diarrhea    Bruxism     Chest pain, atypical 08/26/2013    Chickenpox     COVID-19 2020    Degeneration of cervical intervertebral disc     Diarrhea     Dizziness     Fibromyalgia     Fibromyalgia muscle pain 08/26/2013    Hard to intubate     I have a cerical cage at c4 was told the had to use a child’s breathing tube    Heart burn     High cholesterol     Hypertension     Hypertensive cardiovascular  "disease 12/30/2011    Hypothyroidism     Lumbosacral (joint) (ligament) sprain     Lumbosacral (joint) (ligament) sprain     Obesity     Pain 05/2018    all over    Pain 06/06/2023    to right hip and bilateral knees, and radiates down right leg    Painful joint     Sleep apnea     \"border line apnea\" uses CPAP     SOB (shortness of breath) 08/26/2013    panic attacks. 6/6/23-none for years.    Sore muscles     Swelling of lower extremity     Tonsillitis     Wears glasses      Past Surgical History:   Procedure Laterality Date    PB KNEE SCOPE,MED OR LAT MENIS REPAIR Right 6/14/2023    Procedure: Right Knee arthroscopy, partial medial meniscectomy, chondroplasty;  Surgeon: Yong Cisneros M.D.;  Location: SURGERY [facility];  Service: Orthopedics    VENTRAL HERNIA REPAIR ROBOTIC XI  09/30/2019    Procedure: REPAIR, HERNIA, VENTRAL, ROBOT-ASSISTED, USING Sanrad XI- EXTENDED VIEW EXTRAPERITONEAL INCISIONAL HERNIA WITH MESH PLACEMENT;  Surgeon: Jamie Randolph M.D.;  Location: SURGERY [facility];  Service: General    VENTRAL HERNIA REPAIR ROBOTIC XI  12/03/2018    Procedure: VENTRAL HERNIA REPAIR ROBOTIC XI - INCISIONAL W/POSS MESH PLACEMENT;  Surgeon: Jamie Randolph M.D.;  Location: [facility];  Service: General    HIP ARTH ANTERIOR TOTAL Left 05/25/2018    Procedure: HIP ARTHROPLASTY ANTERIOR TOTAL;  Surgeon: Valdez Ndiaye M.D.;  Location: SURGERY [facility];  Service: Orthopedics    CERVICAL DISK AND FUSION ANTERIOR  cervical vertebral fusion    C4 cage to C5    CERVICAL DISK AND FUSION ANTERIOR      C1 to C7    CHOLECYSTECTOMY      COLONOSCOPY      ENDOMETRIAL ABLATION      ablation     HIP ARTHROSCOPY Right     HIP ARTHROSCOPY Left     LAMINOTOMY      diskectomy L4, L5, S1    LUMBAR LAMINECTOMY DISKECTOMY      unsuccessful    OTHER NEUROLOGICAL SURG      hardware removal to lumbar. Got infected    OTHER NEUROLOGICAL SURG      Nerve root L4-L5 cleaned out    TONSILLECTOMY       Family " History   Problem Relation Age of Onset    Alcohol abuse Father     Lung Disease Mother         Copd    Cancer Maternal Grandmother 94        nonhodgkins lymphoma    Alzheimer's Disease Paternal Grandfather     Asthma Brother         Grew out of it.     Social History     Socioeconomic History    Marital status:      Spouse name: Not on file    Number of children: Not on file    Years of education: Not on file    Highest education level: Bachelor's degree (e.g., BA, AB, BS)   Occupational History    Not on file   Tobacco Use    Smoking status: Former     Current packs/day: 0.00     Average packs/day: 1 pack/day for 10.0 years (10.0 ttl pk-yrs)     Types: Cigarettes     Start date: 1978     Quit date: 1988     Years since quittin.1    Smokeless tobacco: Never   Vaping Use    Vaping Use: Never used   Substance and Sexual Activity    Alcohol use: Not Currently     Alcohol/week: 1.2 oz     Types: 2 Glasses of wine per week    Drug use: No    Sexual activity: Not on file   Other Topics Concern    Not on file   Social History Narrative    Not on file     Social Determinants of Health     Financial Resource Strain: Low Risk  (10/29/2023)    Overall Financial Resource Strain (CARDIA)     Difficulty of Paying Living Expenses: Not hard at all   Food Insecurity: No Food Insecurity (10/29/2023)    Hunger Vital Sign     Worried About Running Out of Food in the Last Year: Never true     Ran Out of Food in the Last Year: Never true   Transportation Needs: No Transportation Needs (10/29/2023)    PRAPARE - Transportation     Lack of Transportation (Medical): No     Lack of Transportation (Non-Medical): No   Physical Activity: Inactive (10/29/2023)    Exercise Vital Sign     Days of Exercise per Week: 0 days     Minutes of Exercise per Session: 0 min   Stress: No Stress Concern Present (10/29/2023)    [employer] - Occupational Stress Questionnaire     Feeling of Stress : Not at all  "  Social Connections: Moderately Integrated (10/29/2023)    Social Connection and Isolation Panel [NHANES]     Frequency of Communication with Friends and Family: More than three times a week     Frequency of Social Gatherings with Friends and Family: Once a week     Attends Jehovah's witness Services: 1 to 4 times per year     Active Member of Clubs or Organizations: No     Attends Club or Organization Meetings: Patient refused     Marital Status:    Intimate Partner Violence: Not on file   Housing Stability: Low Risk  (10/29/2023)    Housing Stability Vital Sign     Unable to Pay for Housing in the Last Year: No     Number of Places Lived in the Last Year: 1     Unstable Housing in the Last Year: No     Allergies   Allergen Reactions    Albumin Diarrhea     Severe diarrhea    Amitriptyline      Sever headache and irritability    Aspirin      Other reaction(s): GI Intolerance    Banana Anaphylaxis    Eggs Diarrhea     Severe diarrhea    Flexeril [Cyclobenzaprine Hcl] Unspecified     \"Makes me crazy\"    Gluten Meal Diarrhea     Severe diarrhea    Nsaids Diarrhea     GI pain  Other reaction(s): GI Intolerance    Demerol Itching     Itching.  Can tolerate small doses.    Diazepam Anxiety    Gabapentin      \"makes me exhausted\"    Percocet [Oxycodone-Acetaminophen] Itching     Teeth grinding    Vicodin [Hydrocodone-Acetaminophen] Itching     Low dose OK (if takes more than 1/2 a pill)    Lactose Diarrhea     .     Outpatient Encounter Medications as of 2/6/2024   Medication Sig Dispense Refill    amLODIPine (NORVASC) 5 MG Tab Take 1 Tablet by mouth every day. 90 Tablet 3    Non Formulary Request Place  under the tongue 2 times a day. BIEST-estrogen only      Cholecalciferol (VITAMIN D PO) Take 10,000 Units by mouth every day. Pt takes liquid      Melatonin 1 MG Tab Take 1 Tab by mouth every bedtime.      MAGNESIUM PO Take 2 Tabs by mouth 2 Times a Day.      B Complex Vitamins (VITAMIN-B COMPLEX PO) Take 1 Tab by mouth " "every day.      Ascorbic Acid (VITAMIN C PO) Take 1 Tab by mouth every day.      metoprolol SR (TOPROL XL) 50 MG TABLET SR 24 HR Take 50 mg by mouth every morning.      progesterone (PROMETRIUM) 200 MG capsule Take 200 mg by mouth every evening.      thyroid (ARMOUR THYROID) 60 MG Tab Take 60 mg by mouth every morning.      [DISCONTINUED] acetaminophen (TYLENOL) 325 MG Tab Take 325 mg by mouth every four hours as needed.       No facility-administered encounter medications on file as of 2/6/2024.     Review of Systems   Constitutional:  Positive for malaise/fatigue. Negative for chills and fever.   HENT:  Negative for congestion.    Eyes:  Negative for blurred vision.   Respiratory:  Negative for shortness of breath.    Cardiovascular:  Positive for chest pain, claudication and leg swelling. Negative for palpitations, orthopnea and PND.   Gastrointestinal:  Positive for diarrhea and heartburn. Negative for abdominal pain.   Genitourinary:  Negative for dysuria.   Musculoskeletal:  Positive for back pain, joint pain and neck pain. Negative for myalgias.   Skin:  Negative for rash.   Neurological:  Negative for dizziness and loss of consciousness.   Psychiatric/Behavioral:  Positive for memory loss. The patient has insomnia.               Objective     /76 (BP Location: Right arm, Patient Position: Sitting, BP Cuff Size: Adult)   Pulse 73   Resp 16   Ht 1.626 m (5' 4\")   Wt 94.3 kg (208 lb)   LMP 12/03/2014   SpO2 94%   BMI 35.70 kg/m²     Physical Exam  Eyes:      Conjunctiva/sclera: Conjunctivae normal.      Pupils: Pupils are equal, round, and reactive to light.   Neck:      Vascular: No JVD.   Cardiovascular:      Rate and Rhythm: Normal rate and regular rhythm.      Pulses: Normal pulses.           Radial pulses are 2+ on the right side and 2+ on the left side.      Heart sounds: Normal heart sounds.   Pulmonary:      Effort: Pulmonary effort is normal. No accessory muscle usage or respiratory " "distress.      Breath sounds: Normal breath sounds. No wheezing or rales.   Musculoskeletal:      Right lower leg: No edema.      Left lower leg: No edema.   Skin:     General: Skin is warm and dry.      Findings: No rash.      Nails: There is no clubbing.   Neurological:      Mental Status: She is alert and oriented to person, place, and time.   Psychiatric:         Behavior: Behavior normal.            MPI 8/31/2018   No evidence of significant jeopardized viable myocardium or prior myocardial    infarction.   Normal left ventricular size, ejection fraction, and wall motion.   ECG INTERPRETATION   Negative stress ECG for ischemia.    ECHOCARDIOGRAM 8/30/2022 [facility]  1. The left ventricle is normal in size and function with an ejection fraction of 60-65% by Wiggins's biplane with no wall motion abnormalities noted. There is normal left ventricular wall thickness. The left ventricular diastolic function is normal.   2. The left atrium is mildly dilated, measuring 35 mL/m2.   3. No significant valve abnormalities noted.   4. The right ventricle is normal in size and function. RVSP is normal at 25 mmHg, given an RAP of 3 mmHg.    EKG 2/6/2024 sinus rhythm, rate 63, minor diffuse nonspecific ST-T wave abnormalities, personally interpreted  Assessment & Plan     1. Essential hypertension  amLODIPine (NORVASC) 5 MG Tab      2. Dyslipidemia  CT-CARDIAC SCORING    LIPID PANEL      3. Obesity (BMI 30-39.9)  EKG          Medical Decision Making: Today's Assessment/Status/Plan:   Clinically stable with no heart failure or ischemic symptoms with reported history of \"nonobstructive coronary artery disease\" by coronary angiography in 2016 at [facility].  Hypertension currently controlled, patient wishes to get off metoprolol, will have her titrate up metoprolol over the next 2 weeks then start amlodipine 5 mg daily continue to monitor  Reassess coronary artery status with coronary calcium score and " recheck lipid panel.  RTC 6 months

## 2024-02-13 ENCOUNTER — OFFICE VISIT (OUTPATIENT)
Dept: MEDICAL GROUP | Facility: CLINIC | Age: 64
End: 2024-02-13
Payer: COMMERCIAL

## 2024-02-13 VITALS
TEMPERATURE: 97.5 F | OXYGEN SATURATION: 97 % | BODY MASS INDEX: 35.75 KG/M2 | HEIGHT: 64 IN | DIASTOLIC BLOOD PRESSURE: 72 MMHG | SYSTOLIC BLOOD PRESSURE: 128 MMHG | HEART RATE: 74 BPM | WEIGHT: 209.4 LBS

## 2024-02-13 DIAGNOSIS — M50.30 DEGENERATION OF CERVICAL INTERVERTEBRAL DISC: ICD-10-CM

## 2024-02-13 DIAGNOSIS — M79.7 FIBROMYALGIA MUSCLE PAIN: ICD-10-CM

## 2024-02-13 DIAGNOSIS — F33.1 MODERATE EPISODE OF RECURRENT MAJOR DEPRESSIVE DISORDER (HCC): ICD-10-CM

## 2024-02-13 DIAGNOSIS — M25.50 ARTHRALGIA, UNSPECIFIED JOINT: ICD-10-CM

## 2024-02-13 DIAGNOSIS — K59.1 FUNCTIONAL DIARRHEA: ICD-10-CM

## 2024-02-13 DIAGNOSIS — R19.7 DIARRHEA, UNSPECIFIED TYPE: ICD-10-CM

## 2024-02-13 DIAGNOSIS — M25.522 LEFT ELBOW PAIN: ICD-10-CM

## 2024-02-13 DIAGNOSIS — F32.A DEPRESSION, UNSPECIFIED DEPRESSION TYPE: ICD-10-CM

## 2024-02-13 DIAGNOSIS — E03.9 ACQUIRED HYPOTHYROIDISM: ICD-10-CM

## 2024-02-13 DIAGNOSIS — I10 HYPERTENSION, UNSPECIFIED TYPE: ICD-10-CM

## 2024-02-13 PROCEDURE — 3078F DIAST BP <80 MM HG: CPT | Performed by: FAMILY MEDICINE

## 2024-02-13 PROCEDURE — 99215 OFFICE O/P EST HI 40 MIN: CPT | Performed by: FAMILY MEDICINE

## 2024-02-13 PROCEDURE — 3074F SYST BP LT 130 MM HG: CPT | Performed by: FAMILY MEDICINE

## 2024-02-13 RX ORDER — DIPHENOXYLATE HYDROCHLORIDE AND ATROPINE SULFATE 2.5; .025 MG/1; MG/1
TABLET ORAL
Qty: 20 TABLET | Refills: 0 | Status: SHIPPED | OUTPATIENT
Start: 2024-02-13 | End: 2024-03-13

## 2024-02-13 RX ORDER — DULOXETIN HYDROCHLORIDE 30 MG/1
30 CAPSULE, DELAYED RELEASE ORAL DAILY
Qty: 30 CAPSULE | Refills: 5 | Status: SHIPPED | OUTPATIENT
Start: 2024-02-13

## 2024-02-13 ASSESSMENT — FIBROSIS 4 INDEX: FIB4 SCORE: 1.49

## 2024-02-13 NOTE — ASSESSMENT & PLAN NOTE
I have placed a referral to psychology.  Also as noted elsewhere I am starting Cymbalta at 30 mg a day.

## 2024-02-13 NOTE — ASSESSMENT & PLAN NOTE
She wants to hold on pain management referral for now.  I am not going to prescribe any opiates but did review other options.  I do not see any record of her being on duloxetine and she is unaware has tried this before.  I do think it could be helpful if she tolerates it.  After discussion, I am starting 30 mg of Cymbalta and we will increase this to 60 if she is doing okay on it.  Follow-up in 1 month.

## 2024-02-13 NOTE — ASSESSMENT & PLAN NOTE
I gave her # 20 Lomotil, but this is one time as it is a controlled substance. I will refer her back to GI if there is an ongoing need.

## 2024-02-13 NOTE — ASSESSMENT & PLAN NOTE
Strength is good in this area.  There is some fleshy swelling around the elbow I do not feel any discrete mass.  I will go ahead and order an ultrasound to evaluate further and then we can decide whether physical therapy might be helpful.

## 2024-02-13 NOTE — ASSESSMENT & PLAN NOTE
Exam is grossly normal as far as any deformities in her hands or feet joints.  Recommending further lab testing before referral to include a rheumatoid panel along with inflammatory markers such as a sed rate and CRP.  Depending on those results we will pursue a plan of action possibly including referral.

## 2024-02-13 NOTE — PROGRESS NOTES
Subjective:     CC: Multiple. Pain, Arthralgias, Elbow, lab    HPI:   Senia presents today to discuss the following issues     Rheum referral.   Fhx   JAMIN 1:80 pain.  Rheumatology panel  Lump on R forearm: CBC  Tear Left elbow during Pilates  Fibromyalgia:   Psychology:   Elbow pain: u/s  Lomotil        Problem   Left Elbow Pain    He feels she could have torn something during a Pilates class several months ago.  There is a painful swelling around the elbow that is persistent.  She has tried some gentle exercises at home but this is not resolving.     Arthralgia    Senia suffers from generalized pain, partly fibromyalgia and also from multiple joints.  She has extra concern and that her mother apparently suffered from rheumatoid arthritis with deforming crippling disease and was on methotrexate.  She also had an abnormal JAMIN drawn years ago that was high at 1:80.  She is wondering now about rheumatology referral and other follow-up.     Moderate Episode of Recurrent Major Depressive Disorder (Hcc)    Senia continues to suffer from depression affecting her sleep.  Made worse by chronic pain and multiple medical issues.  She is interested in counseling.     Functional Diarrhea    She has chronic diarrhea that has been evaluated by GI in the past. Requesting RF of Lomotil. Imodium is ineffective.     Fibromyalgia Muscle Pain    Senia continues to suffer from generalized pain which is fairly debilitating.  She has tried a number of modalities is fairly sensitive to medications.  Tramadol has been helpful, she has not seen physiatry or other pain management for some time.    Prior note:      Senia has fairly poorly controlled pain related to her fibromyalgia.  She did see an allergist today and some new medicines and recommendations were made.  She is hopeful that will help.         Current Outpatient Medications Ordered in Epic   Medication Sig Dispense Refill    DULoxetine (CYMBALTA) 30 MG Cap   "Particles Take 1 Capsule by mouth every day. 30 Capsule 5    diphenoxylate-atropine (LOMOTIL) 2.5-0.025 MG Tab 1 po bid 20 Tablet 0    Non Formulary Request Place  under the tongue 2 times a day. BIEST-estrogen only      Cholecalciferol (VITAMIN D PO) Take 10,000 Units by mouth every day. Pt takes liquid      Melatonin 1 MG Tab Take 1 Tab by mouth every bedtime.      MAGNESIUM PO Take 2 Tabs by mouth 2 Times a Day.      B Complex Vitamins (VITAMIN-B COMPLEX PO) Take 1 Tab by mouth every day.      Ascorbic Acid (VITAMIN C PO) Take 1 Tab by mouth every day.      metoprolol SR (TOPROL XL) 50 MG TABLET SR 24 HR Take 50 mg by mouth every morning.      progesterone (PROMETRIUM) 200 MG capsule Take 200 mg by mouth every evening.      thyroid (ARMOUR THYROID) 60 MG Tab Take 60 mg by mouth every morning.      amLODIPine (NORVASC) 5 MG Tab Take 1 Tablet by mouth every day. (Patient not taking: Reported on 2/13/2024) 90 Tablet 3     No current Epic-ordered facility-administered medications on file.       Health Maintenance:     ROS:  Gen: no fevers/chills, no changes in weight  Eyes: no changes in vision  ENT: no sore throat, no hearing loss, no bloody nose  Pulm: no sob, no cough  CV: no chest pain, no palpitations  GI: no nausea/vomiting, no diarrhea  : no dysuria  MSk: no myalgias  Skin: no rash  Neuro: no headaches, no numbness/tingling  Heme/Lymph: no easy bruising      Objective:     Exam:  /72 (BP Location: Right arm, Patient Position: Sitting, BP Cuff Size: Adult)   Pulse 74   Temp 36.4 °C (97.5 °F) (Temporal)   Ht 1.626 m (5' 4\")   Wt 95 kg (209 lb 6.4 oz)   LMP 12/03/2014   SpO2 97%   BMI 35.94 kg/m²  Body mass index is 35.94 kg/m².    Gen: Alert and oriented, No apparent distress.  Neck: Neck is supple without lymphadenopathy.  Lungs: Normal effort, CTA bilaterally, no wheezes, rhonchi, or rales  CV: Regular rate and rhythm. No murmurs, rubs, or gallops.  Ext: No clubbing, cyanosis, " edema.          Assessment & Plan:     63 y.o. female with the following -     Problem List Items Addressed This Visit       Degeneration of cervical intervertebral disc    Relevant Orders    RHEUMATOID ARTHRITIS PANEL    CBC WITH DIFFERENTIAL    Hypertension    Relevant Orders    Lipid Profile    Fibromyalgia muscle pain     She wants to hold on pain management referral for now.  I am not going to prescribe any opiates but did review other options.  I do not see any record of her being on duloxetine and she is unaware has tried this before.  I do think it could be helpful if she tolerates it.  After discussion, I am starting 30 mg of Cymbalta and we will increase this to 60 if she is doing okay on it.  Follow-up in 1 month.         Relevant Medications    DULoxetine (CYMBALTA) 30 MG Cap DR Particles    Other Relevant Orders    CRP QUANTITIVE (NON-CARDIAC)    Comp Metabolic Panel    Sed Rate    Acquired hypothyroidism    Left elbow pain     Strength is good in this area.  There is some fleshy swelling around the elbow I do not feel any discrete mass.  I will go ahead and order an ultrasound to evaluate further and then we can decide whether physical therapy might be helpful.         Relevant Orders    US-EXTREMITY NON VASCULAR BILATERAL    Arthralgia     Exam is grossly normal as far as any deformities in her hands or feet joints.  Recommending further lab testing before referral to include a rheumatoid panel along with inflammatory markers such as a sed rate and CRP.  Depending on those results we will pursue a plan of action possibly including referral.         Moderate episode of recurrent major depressive disorder (HCC)     I have placed a referral to psychology.  Also as noted elsewhere I am starting Cymbalta at 30 mg a day.         Relevant Medications    DULoxetine (CYMBALTA) 30 MG Cap DR Particles    Functional diarrhea     I gave her # 20 Lomotil, but this is one time as it is a controlled substance. I will  refer her back to GI if there is an ongoing need.           Other Visit Diagnoses       Depression, unspecified depression type        Relevant Medications    DULoxetine (CYMBALTA) 30 MG Cap DR Particles    Other Relevant Orders    Referral to Psychology    Diarrhea, unspecified type        Relevant Medications    diphenoxylate-atropine (LOMOTIL) 2.5-0.025 MG Tab            I spent a total of 53 minutes with record review, exam, communication with the patient, communication with other providers, and documentation of this encounter.      No follow-ups on file.

## 2024-02-14 ENCOUNTER — APPOINTMENT (OUTPATIENT)
Dept: MEDICAL GROUP | Facility: CLINIC | Age: 64
End: 2024-02-14
Payer: COMMERCIAL

## 2024-02-21 ENCOUNTER — HOSPITAL ENCOUNTER (OUTPATIENT)
Dept: RADIOLOGY | Facility: MEDICAL CENTER | Age: 64
End: 2024-02-21
Attending: INTERNAL MEDICINE
Payer: COMMERCIAL

## 2024-02-21 DIAGNOSIS — E78.5 DYSLIPIDEMIA: ICD-10-CM

## 2024-02-21 PROCEDURE — 4410556 CT-CARDIAC SCORING (SELF PAY ONLY)

## 2024-02-24 NOTE — RESULT ENCOUNTER NOTE
Called and spoke with the patient about the results of her cardiac scan and coronary calcium score of 5.5.    CLARK 10-Year CHD Risk with Coronary Artery Calcification     The estimated 10-year risk of a CHD event for a person with this risk factor profile including coronary calcium is 3.0%. The estimated 10-year risk of a CHD event for a person with this risk factor profile if we did not factor in their coronary calcium score would be 3.5%.    Continue diet modification and exercise.  Low risk therefore no indication for pharmacologic therapy for lipid status  Recommend repeat coronary calcium score in 5 years

## 2024-03-11 DIAGNOSIS — I11.9 HYPERTENSIVE HEART DISEASE, UNSPECIFIED WHETHER HEART FAILURE PRESENT: ICD-10-CM

## 2024-03-11 DIAGNOSIS — M25.50 ARTHRALGIA, UNSPECIFIED JOINT: ICD-10-CM

## 2024-03-25 ENCOUNTER — PATIENT MESSAGE (OUTPATIENT)
Dept: CARDIOLOGY | Facility: MEDICAL CENTER | Age: 64
End: 2024-03-25
Payer: COMMERCIAL

## 2024-03-26 NOTE — PROGRESS NOTES
Stop amlodipine 5 mg daily and try to take 1/2 tablet; if not scored with a line in the middle of the tablet I can send in a trial prescription of 2.5 mg  Of this is not successful will have to try another medication  Hopefully this works

## 2024-05-01 PROBLEM — R22.31 LOCALIZED SWELLING OF FINGER OF RIGHT HAND: Status: ACTIVE | Noted: 2024-05-01

## 2024-05-06 PROBLEM — M79.89 HAND SWELLING: Status: ACTIVE | Noted: 2024-05-06

## 2024-06-19 ENCOUNTER — OFFICE VISIT (OUTPATIENT)
Dept: SLEEP MEDICINE | Facility: MEDICAL CENTER | Age: 64
End: 2024-06-19
Attending: PHYSICIAN ASSISTANT
Payer: COMMERCIAL

## 2024-06-19 VITALS
OXYGEN SATURATION: 97 % | BODY MASS INDEX: 35 KG/M2 | HEIGHT: 64 IN | SYSTOLIC BLOOD PRESSURE: 104 MMHG | RESPIRATION RATE: 16 BRPM | HEART RATE: 72 BPM | DIASTOLIC BLOOD PRESSURE: 68 MMHG | WEIGHT: 205 LBS

## 2024-06-19 DIAGNOSIS — G47.33 OBSTRUCTIVE SLEEP APNEA: ICD-10-CM

## 2024-06-19 PROCEDURE — 3078F DIAST BP <80 MM HG: CPT | Performed by: PHYSICIAN ASSISTANT

## 2024-06-19 PROCEDURE — 99213 OFFICE O/P EST LOW 20 MIN: CPT | Performed by: PHYSICIAN ASSISTANT

## 2024-06-19 PROCEDURE — 3074F SYST BP LT 130 MM HG: CPT | Performed by: PHYSICIAN ASSISTANT

## 2024-06-19 ASSESSMENT — ENCOUNTER SYMPTOMS
ROS GI COMMENTS: NO DENTURES, NO MISSING TEETH OR SWALLOWING ISSUES
SORE THROAT: 0
COUGH: 0
CHILLS: 0
FEVER: 0
INSOMNIA: 0
HEARTBURN: 0
SHORTNESS OF BREATH: 0
SINUS PAIN: 0
SPUTUM PRODUCTION: 0
PALPITATIONS: 0
DIZZINESS: 0
ORTHOPNEA: 0
HEADACHES: 0
TREMORS: 0
WHEEZING: 0
WEIGHT LOSS: 0

## 2024-06-19 ASSESSMENT — FIBROSIS 4 INDEX: FIB4 SCORE: 1.24

## 2024-06-19 NOTE — PROGRESS NOTES
"Chief Complaint   Patient presents with    Follow-Up     LOV 1/24/24 Dr. Queen        HPI:  Senia Valle is a 63 y.o. year old female here today for follow-up on obstructive sleep apnea.  Last seen in clinic by Dr. Jaime Queen 1/24/2024.    Past Medical History: Cervical spinal degeneration, hypertension, fibromyalgia, acquired hypothyroidism, coronary artery disease, moderate depressive disorder, former smoker with 10-pack-year history and quit date in 1988.  Reports increased neck issues at this time.  Patient  is a physician.    Vitals:  /68 (BP Location: Left arm, Patient Position: Sitting, BP Cuff Size: Small adult)   Pulse 72   Resp 16   Ht 1.626 m (5' 4\")   Wt 93 kg (205 lb)   SpO2 97% BMI 35.19 kg/m²    Recent Imaging: None     Currently using  Resmed auto CPAP @ 5-12 cm H20 pressure; compliance reviewed for 5/20/2024-6/18/2024, days used 30/30, average daily usage 8 hours 12 minutes, 100% of days greater than or equal to 4 hours, mask leak at 1 LPM at 95th percentile, AHI 0.6 per hour.  See media for full report.      Device obtained 10/1/2021  DME provider [facility]  Mask interface nasal cushion    Polysomnogram obtained 5/11/2016 demonstrating findings consistent with mild to moderate obstructive sleep apnea overall AHI of 14.4 increasing to 72 during REM sleep and 17.8 events per hour while supine.  Low oxygen saturation 79%.  Oxygen saturations were less than or equal to 89% 1.8 minutes of recorded sleep time.    Overnight oximetry obtained 7/26/2016 demonstrated low oxygen saturation of 81% with sats less than 88% for 91 minutes.  Findings consistent with mild cyclical hypoxia, may reflect suboptimally treated sleep apnea/hypopnea.    Sleep schedule goes to bed 1030, wakens 8 , and gets up during the night 1-3 times   Symptoms denies day time somnolence and denies morning headache          Review of Systems   Constitutional:  Negative for chills, fever, malaise/fatigue and " "weight loss.   HENT:  Negative for congestion, hearing loss, nosebleeds, sinus pain, sore throat and tinnitus.    Eyes:         Presc readers   Respiratory:  Negative for cough, sputum production, shortness of breath and wheezing.    Cardiovascular:  Positive for leg swelling (bad knees). Negative for chest pain, palpitations and orthopnea.   Gastrointestinal:  Negative for heartburn.        No dentures, no missing teeth or swallowing issues    Neurological:  Negative for dizziness, tremors and headaches.   Psychiatric/Behavioral:  The patient does not have insomnia.        Past Medical History:   Diagnosis Date    Anxiety disorder 05/2018    not an issue at this time. 6/6/23-RESOLVED was related to meds.    Arthritis Last week with my mri of my knee    Back pain     Bowel habit changes     diarrhea    Bruxism     Chest pain, atypical 08/26/2013    Chickenpox     COVID-19 2020    Degeneration of cervical intervertebral disc     Diarrhea     Dizziness     Fibromyalgia     Fibromyalgia muscle pain 08/26/2013    Hard to intubate     I have a cerical cage at c4 was told the had to use a child’s breathing tube    Heart burn     High cholesterol     Hypertension     Hypertensive cardiovascular disease 12/30/2011    Hypothyroidism     Lumbosacral (joint) (ligament) sprain     Lumbosacral (joint) (ligament) sprain     Obesity     Pain 05/2018    all over    Pain 06/06/2023    to right hip and bilateral knees, and radiates down right leg    Painful joint     Sleep apnea     \"border line apnea\" uses CPAP     SOB (shortness of breath) 08/26/2013    panic attacks. 6/6/23-none for years.    Sore muscles     Swelling of lower extremity     Tonsillitis     Wears glasses        Past Surgical History:   Procedure Laterality Date    PB KNEE SCOPE,MED OR LAT MENIS REPAIR Right 6/14/2023    Procedure: Right Knee arthroscopy, partial medial meniscectomy, chondroplasty;  Surgeon: Yong Cisneros M.D.;  Location: SURGERY [facility];  " Service: Orthopedics    VENTRAL HERNIA REPAIR ROBOTIC XI  09/30/2019    Procedure: REPAIR, HERNIA, VENTRAL, ROBOT-ASSISTED, USING DA FREDIS XI- EXTENDED VIEW EXTRAPERITONEAL INCISIONAL HERNIA WITH MESH PLACEMENT;  Surgeon: Jamie Randolph M.D.;  Location: SURGERY [facility];  Service: General    VENTRAL HERNIA REPAIR ROBOTIC XI  12/03/2018    Procedure: VENTRAL HERNIA REPAIR ROBOTIC XI - INCISIONAL W/POSS MESH PLACEMENT;  Surgeon: Jamie Randolph M.D.;  Location: SURGERY [facility];  Service: General    HIP ARTH ANTERIOR TOTAL Left 05/25/2018    Procedure: HIP ARTHROPLASTY ANTERIOR TOTAL;  Surgeon: Valdez Ndiaye M.D.;  Location: SURGERY [facility];  Service: Orthopedics    CERVICAL DISK AND FUSION ANTERIOR  cervical vertebral fusion    C4 cage to C5    CERVICAL DISK AND FUSION ANTERIOR      C1 to C7    CHOLECYSTECTOMY      COLONOSCOPY      ENDOMETRIAL ABLATION      ablation     HIP ARTHROSCOPY Right     HIP ARTHROSCOPY Left     LAMINOTOMY      diskectomy L4, L5, S1    LUMBAR LAMINECTOMY DISKECTOMY      unsuccessful    OTHER NEUROLOGICAL SURG      hardware removal to lumbar. Got infected    OTHER NEUROLOGICAL SURG      Nerve root L4-L5 cleaned out    TONSILLECTOMY         Family History   Problem Relation Age of Onset    Alcohol abuse Father     Lung Disease Mother         Copd    Cancer Maternal Grandmother 94        nonhodgkins lymphoma    Alzheimer's Disease Paternal Grandfather     Asthma Brother         Grew out of it.       Social History     Socioeconomic History    Marital status:      Spouse name: Not on file    Number of children: Not on file    Years of education: Not on file    Highest education level: Bachelor's degree (e.g., BA, AB, BS)   Occupational History    Not on file   Tobacco Use    Smoking status: Former     Current packs/day: 0.00     Average packs/day: 1 pack/day for 10.0 years (10.0 ttl pk-yrs)     Types: Cigarettes     Start date: 1/1/1978     Quit date: 1/1/1988      Years since quittin.4    Smokeless tobacco: Never   Vaping Use    Vaping status: Never Used   Substance and Sexual Activity    Alcohol use: Not Currently     Alcohol/week: 1.2 oz     Types: 2 Glasses of wine per week    Drug use: No    Sexual activity: Not on file   Other Topics Concern    Not on file   Social History Narrative    Not on file     Social Determinants of Health     Financial Resource Strain: Low Risk  (10/29/2023)    Overall Financial Resource Strain (CARDIA)     Difficulty of Paying Living Expenses: Not hard at all   Food Insecurity: No Food Insecurity (10/29/2023)    Hunger Vital Sign     Worried About Running Out of Food in the Last Year: Never true     Ran Out of Food in the Last Year: Never true   Transportation Needs: No Transportation Needs (10/29/2023)    PRAPARE - Transportation     Lack of Transportation (Medical): No     Lack of Transportation (Non-Medical): No   Physical Activity: Inactive (10/29/2023)    Exercise Vital Sign     Days of Exercise per Week: 0 days     Minutes of Exercise per Session: 0 min   Stress: No Stress Concern Present (10/29/2023)    [place] [employer] - Occupational Stress Questionnaire     Feeling of Stress : Not at all   Social Connections: Moderately Integrated (10/29/2023)    Social Connection and Isolation Panel [NHANES]     Frequency of Communication with Friends and Family: More than three times a week     Frequency of Social Gatherings with Friends and Family: Once a week     Attends Quaker Services: 1 to 4 times per year     Active Member of Clubs or Organizations: No     Attends Club or Organization Meetings: Patient declined     Marital Status:    Intimate Partner Violence: Not on file   Housing Stability: Low Risk  (10/29/2023)    Housing Stability Vital Sign     Unable to Pay for Housing in the Last Year: No     Number of Places Lived in the Last Year: 1     Unstable Housing in the Last Year: No       Allergies as of  06/19/2024 - Reviewed 06/19/2024   Allergen Reaction Noted    Amitriptyline  06/06/2023    Aspirin  05/02/2022    Banana Anaphylaxis 05/14/2018    Eggs Diarrhea 08/30/2018    Flexeril [cyclobenzaprine hcl] Unspecified 12/29/2011    Gluten meal Diarrhea 08/30/2018    Nsaids Diarrhea 12/29/2011    Demerol Itching 05/05/2016    Diazepam Anxiety 05/05/2016    Gabapentin  05/25/2018          Current medications as of today   Current Outpatient Medications   Medication Sig Dispense Refill    methylPREDNISolone (MEDROL DOSEPAK) 4 MG Tablet Therapy Pack Follow schedule on package instructions. 21 Tablet 0    DULoxetine (CYMBALTA) 30 MG Cap DR Particles Take 1 Capsule by mouth every day. 30 Capsule 5    amLODIPine (NORVASC) 5 MG Tab Take 1 Tablet by mouth every day. (Patient not taking: Reported on 2/13/2024) 90 Tablet 3    Non Formulary Request Place  under the tongue 2 times a day. BIEST-estrogen only      Cholecalciferol (VITAMIN D PO) Take 10,000 Units by mouth every day. Pt takes liquid      Melatonin 1 MG Tab Take 1 Tab by mouth every bedtime.      MAGNESIUM PO Take 2 Tabs by mouth 2 Times a Day.      B Complex Vitamins (VITAMIN-B COMPLEX PO) Take 1 Tab by mouth every day.      Ascorbic Acid (VITAMIN C PO) Take 1 Tab by mouth every day.      metoprolol SR (TOPROL XL) 50 MG TABLET SR 24 HR Take 50 mg by mouth every morning.      progesterone (PROMETRIUM) 200 MG capsule Take 200 mg by mouth every evening.      thyroid (ARMOUR THYROID) 60 MG Tab Take 60 mg by mouth every morning.       No current facility-administered medications for this visit.         Physical Exam:   Gen:           Alert and oriented, No apparent distress. Mood and affect appropriate, normal interaction with examiner.   Hearing:     Grossly intact.  Nose:          Normal, no lesions or deformities.  Dentition:    Good dentition.   Oropharynx:   Tongue normal, posterior pharynx without erythema or exudate.  Mallampati Classification: III  Neck:         Supple, trachea midline, no masses.  Respiratory Effort: No intercostal retractions or use of accessory muscles.   Gait and Station: Normal.  Digits and Nails: No clubbing, cyanosis, petechiae, or nodes.   Skin:        No rashes, lesions or ulcers noted.               Ext:           No cyanosis or edema.      Immunizations:  Flu: Recommended  Moderna SARS CoV2 Vaccine: 3/4/2021,2/4/2021  Pfizer SARS-CoV-2 vaccine: 11/29/2021    Assessment / Plan:  1. Obstructive sleep apnea  - DME Mask and Supplies    Patient reports here to establish with new provider, no specific concerns at this time.  Reviewed compliance which is excellent.  Patient is demonstrating use and benefit.  Send updated mask and supply order to [facility].  Reminded to use distilled water only in humidifier chamber.  Reviewed equipment cleaning and equipment replacement recommendations.  Follow-up in 1 year, sooner if needed.      Follow-up:   Return in about 1 year (around 6/19/2025) for Return with Barb Cat PA-C.    Please note that this dictation was created using voice recognition software. I have made every reasonable attempt to correct obvious errors, but it is possible there are errors of grammar and possibly content that I did not discover before finalizing the note.

## 2024-06-19 NOTE — PATIENT INSTRUCTIONS
1-establishing with new provider   2-no concerns at this time  3-reviewed compliance which is excellent  4-demonstrating use and benefit  5-As a reminder use distilled water only in humidifier chamber.    6-Today we reviewed equipment cleaning  once weekly minimum  mask, tubing and water chamber  use dedicated container  use mild soap and water  SoClean or other ozone  are not recommended  white vinegar and water solution is no longer recommended  hang tubing to dry  mask sanitizing wipes are an option for use   7-Equipment replacement schedule : Nasal pillows 2 times per month, Head gear every 6 months, Tubing every 3 months, Ultra-fine filters 2 times per month, Humidifier chamber every 6 months    8-follow up in one year sooner if needed

## 2024-10-04 ENCOUNTER — HOSPITAL ENCOUNTER (OUTPATIENT)
Dept: RADIOLOGY | Facility: MEDICAL CENTER | Age: 64
End: 2024-10-04
Attending: NURSE PRACTITIONER
Payer: COMMERCIAL

## 2024-10-04 DIAGNOSIS — Z12.31 ENCOUNTER FOR SCREENING MAMMOGRAM FOR MALIGNANT NEOPLASM OF BREAST: ICD-10-CM

## 2024-10-04 PROCEDURE — 77067 SCR MAMMO BI INCL CAD: CPT

## 2025-02-28 ENCOUNTER — OFFICE VISIT (OUTPATIENT)
Dept: CARDIOLOGY | Facility: MEDICAL CENTER | Age: 65
End: 2025-02-28
Attending: INTERNAL MEDICINE
Payer: COMMERCIAL

## 2025-02-28 VITALS
SYSTOLIC BLOOD PRESSURE: 110 MMHG | HEART RATE: 71 BPM | BODY MASS INDEX: 35.17 KG/M2 | WEIGHT: 206 LBS | RESPIRATION RATE: 16 BRPM | DIASTOLIC BLOOD PRESSURE: 62 MMHG | OXYGEN SATURATION: 96 % | HEIGHT: 64 IN

## 2025-02-28 DIAGNOSIS — E78.1 HYPERTRIGLYCERIDEMIA: ICD-10-CM

## 2025-02-28 DIAGNOSIS — E78.5 DYSLIPIDEMIA: ICD-10-CM

## 2025-02-28 DIAGNOSIS — I25.83 CORONARY ARTERY DISEASE DUE TO LIPID RICH PLAQUE: ICD-10-CM

## 2025-02-28 DIAGNOSIS — I25.10 CORONARY ARTERY DISEASE DUE TO LIPID RICH PLAQUE: ICD-10-CM

## 2025-02-28 DIAGNOSIS — R93.1 AGATSTON CAC SCORE, <100: ICD-10-CM

## 2025-02-28 DIAGNOSIS — I10 PRIMARY HYPERTENSION: Chronic | ICD-10-CM

## 2025-02-28 PROCEDURE — 99213 OFFICE O/P EST LOW 20 MIN: CPT | Performed by: INTERNAL MEDICINE

## 2025-02-28 RX ORDER — TRAMADOL HYDROCHLORIDE 50 MG/1
TABLET ORAL
COMMUNITY
Start: 2024-11-30

## 2025-02-28 RX ORDER — PREGABALIN 25 MG/1
CAPSULE ORAL
COMMUNITY
Start: 2024-11-30

## 2025-02-28 ASSESSMENT — FIBROSIS 4 INDEX: FIB4 SCORE: 1.26

## 2025-02-28 NOTE — PATIENT INSTRUCTIONS
You may want to consider tirzepatide (Mounjaro®, Zepbound® ), semaglutide (Ozempic®, Wegovy®),  which are injections with cardiovascular benefits but also risks.  Gastrointestinal side effects are common:15-45% of patients.  These are not advised if you have a history of pancreatitis, Type I Diabetes, personal of family history of multiple endocrine neoplasia MEN syndrome. They should be used in caution with patients with gastroparesis and retinopathy.  They can cause serious health effects such as muscle wasting if you are not on a proper diet and weight loss achieved is rapidy regained when you stop the medication, they are intended for lifetime use.  STRENGTH TRAINING IS IMPORTANT AT LEAST TWICE A WEEK.            Work on at least 2.5 - 5 hours a week of moderate exercise (typical brisk walking or similar activity)    If you have had a heart attack, stent, bypass or reduced heart strength (EF <35%): cardiac rehab may reduce your risk of dying by 13-24% and need to go to the hospital by 30% within the first year (1)    Please look into the following diets and incorporate them into your diet    CONSIDER CHECKING A CALCIUM SCORE TO UNDERSTAND YOUR RISK MORE    https://internal.zaman-nhlbi.org/about/procedures/tools/lxfs-onvre-lnni-calculator    LOW SALT DIET   KEEP YOUR SODIUM EQUAL TO CALORIES AND NO MORE THAN DOUBLE THE CALORIES FOR A LOW SALT DIET    Cardiosmart.org - great resource for American College of Cardiology on heart disease prevention and treatment    FOR TREATMENT OR PREVENTION OF CORONARY ARTERY DISEASE  These three programs are approved by Medicare/Insurers for those with heart disease  [place] [facility]  Dr. Simms's Program for Reversing Heart Disease - Yves Tobins Cardiologist vegetarian-based  [facility] - [place]-based mind-body Program    Mediterranean Diet has been shown to be a hearty healthy diet.    This is a commonly referenced  Program  Dr Valladares - [place] over [place] (book and documentary) - vegetarian-based    FOR TREATMENT OF BLOOD PRESSURE  DASH DIET - American Heart Association for treatment of HYPERTENSION    FOR TREATMENT OF BAD CHOLESTEROL/FATS  REDUCE PROCESSED SUGAR AS MUCH AS POSSIBLE  INCREASE WHOLE GRAINS/VEGETABLES  INCREASE FIBER    Lowering total cholesterol and LDL (bad) cholesterol:  - Eat leaner cuts of meat, or eliminate altogether if possible red meat, and frequently substitute fish or chicken.  - Limit saturated fat to no more than 7-10% of total calories no more than 10 g per day is recommended. Some sources of saturated fat include butter, animal fats, hydrogenated vegetable fats and oils, many desserts, whole milk dairy products.  - Replaced saturated fats with polyunsaturated fats and monounsaturated fats. Foods high in monounsaturated fat include nuts, canola oil, avocados, and olives.  - Limit trans fat (processed foods) and replaced with fresh fruits and vegetables  - Recommend nonfat dairy products  - Increase substantially the amount of soluble fiber intake (legumes such as beans, fruit, whole grains).  - Consider nutritional supplements: plant sterile spreads such as Benecol, fish oil,  flaxseed oil, omega-3 acids capsules 1000 mg twice a day, or viscous fiber such as Metamucil  - Attain ideal weight and regular exercise (at least 30 minutes per day of moderate exercise)  ASK ABOUT STATIN OR NON STATIN MEDICATION TO REDUCE YOUR LDL AND HEART RISK    Lowering triglycerides:  - Reduce intake of simple sugar: Desserts, candy, pastries, honey, sodas, sugared cereals, yogurt, Gatorade, sports bars, canned fruit, smoothies, fruit juice, coffee drinks  - Reduced intake of refined starches: Refined Pasta, most bread  - Reduce or abstain from alcohol  - Increase omega-3 fatty acids: [place], Trout, Mackerel, [place]ring, [place] tuna and supplements  - Attain ideal weight and regular exercise (at least 30 minutes per  day of moderate exercise)  ASK ABOUT PURIFIED OMEGA 3 EPA or FISH OIL TO REDUCE YOUR TG AND HEART RISK    Elevating HDL (good) cholesterol:  - Increase physical activity  - Increase omega-3 fatty acids and supplements as listed above  - Incorporating appropriate amounts of monounsaturated fats such as nuts, olive oil, canola oil, avocados, olives  - Stop smoking  - Attain ideal weight and regular exercise (at least 30 minutes per day of moderate exercise)

## 2025-02-28 NOTE — PROGRESS NOTES
"Chief Complaint   Patient presents with    Hypertension    Shortness of Breath    Palpitations       Subjective     Senia Valle is a 64 y.o. female who presents today with mild CAD HTN low HDL with high TG    Dogin well tried off metop but BP did increase    Past Medical History:   Diagnosis Date    Anxiety disorder 05/2018    not an issue at this time. 6/6/23-RESOLVED was related to meds.    Arthritis Last week with my mri of my knee    Back pain     Bowel habit changes     diarrhea    Bruxism     Chest pain, atypical 08/26/2013    Chickenpox     COVID-19 2020    Degeneration of cervical intervertebral disc     Diarrhea     Dizziness     Fibromyalgia     Fibromyalgia muscle pain 08/26/2013    Hard to intubate     I have a cerical cage at c4 was told the had to use a child’s breathing tube    Heart burn     High cholesterol     Hypertension     Hypertensive cardiovascular disease 12/30/2011    Hypothyroidism     Lumbosacral (joint) (ligament) sprain     Lumbosacral (joint) (ligament) sprain     Obesity     Pain 05/2018    all over    Pain 06/06/2023    to right hip and bilateral knees, and radiates down right leg    Painful joint     Sleep apnea     \"border line apnea\" uses CPAP     SOB (shortness of breath) 08/26/2013    panic attacks. 6/6/23-none for years.    Sore muscles     Swelling of lower extremity     Tonsillitis     Wears glasses      Past Surgical History:   Procedure Laterality Date    PB KNEE SCOPE,MED OR LAT MENIS REPAIR Right 6/14/2023    Procedure: Right Knee arthroscopy, partial medial meniscectomy, chondroplasty;  Surgeon: Yong Cisneros M.D.;  Location: SURGERY [facility];  Service: Orthopedics    VENTRAL HERNIA REPAIR ROBOTIC XI  09/30/2019    Procedure: REPAIR, HERNIA, VENTRAL, ROBOT-ASSISTED, USING DA FREDIS XI- EXTENDED VIEW EXTRAPERITONEAL INCISIONAL HERNIA WITH MESH PLACEMENT;  Surgeon: Jamie Randolph M.D.;  Location: SURGERY [facility];  Service: General    VENTRAL HERNIA " REPAIR ROBOTIC XI  2018    Procedure: VENTRAL HERNIA REPAIR ROBOTIC XI - INCISIONAL W/POSS MESH PLACEMENT;  Surgeon: Jamie Randolph M.D.;  Location: SURGERY [facility];  Service: General    HIP ARTH ANTERIOR TOTAL Left 2018    Procedure: HIP ARTHROPLASTY ANTERIOR TOTAL;  Surgeon: Valdez Ndiaye M.D.;  Location: SURGERY [facility];  Service: Orthopedics    CERVICAL DISK AND FUSION ANTERIOR  cervical vertebral fusion    C4 cage to C5    CERVICAL DISK AND FUSION ANTERIOR      C1 to C7    CHOLECYSTECTOMY      COLONOSCOPY      ENDOMETRIAL ABLATION      ablation     HIP ARTHROSCOPY Right     HIP ARTHROSCOPY Left     LAMINOTOMY      diskectomy L4, L5, S1    LUMBAR LAMINECTOMY DISKECTOMY      unsuccessful    OTHER NEUROLOGICAL SURG      hardware removal to lumbar. Got infected    OTHER NEUROLOGICAL SURG      Nerve root L4-L5 cleaned out    TONSILLECTOMY       Family History   Problem Relation Age of Onset    Alcohol abuse Father     Lung Disease Mother         Copd    Cancer Maternal Grandmother 94        nonhodgkins lymphoma    Alzheimer's Disease Paternal Grandfather     Asthma Brother         Grew out of it.     Social History     Socioeconomic History    Marital status:      Spouse name: Not on file    Number of children: Not on file    Years of education: Not on file    Highest education level: Bachelor's degree (e.g., BA, AB, BS)   Occupational History    Not on file   Tobacco Use    Smoking status: Former     Current packs/day: 0.00     Average packs/day: 1 pack/day for 10.0 years (10.0 ttl pk-yrs)     Types: Cigarettes     Start date: 1978     Quit date: 1988     Years since quittin.1    Smokeless tobacco: Never   Vaping Use    Vaping status: Never Used   Substance and Sexual Activity    Alcohol use: Not Currently     Alcohol/week: 1.2 oz     Types: 2 Glasses of wine per week    Drug use: No    Sexual activity: Not on file   Other Topics Concern    Not on file   Social  "History Narrative    Not on file     Social Drivers of Health     Financial Resource Strain: Low Risk  (10/29/2023)    Overall Financial Resource Strain (CARDIA)     Difficulty of Paying Living Expenses: Not hard at all   Food Insecurity: No Food Insecurity (10/29/2023)    Hunger Vital Sign     Worried About Running Out of Food in the Last Year: Never true     Ran Out of Food in the Last Year: Never true   Transportation Needs: No Transportation Needs (10/29/2023)    PRAPARE - Transportation     Lack of Transportation (Medical): No     Lack of Transportation (Non-Medical): No   Physical Activity: Inactive (10/29/2023)    Exercise Vital Sign     Days of Exercise per Week: 0 days     Minutes of Exercise per Session: 0 min   Stress: No Stress Concern Present (10/29/2023)    [employer] [place] [employer] - Occupational Stress Questionnaire     Feeling of Stress : Not at all   Social Connections: Moderately Integrated (10/29/2023)    Social Connection and Isolation Panel [NHANES]     Frequency of Communication with Friends and Family: More than three times a week     Frequency of Social Gatherings with Friends and Family: Once a week     Attends Islam Services: 1 to 4 times per year     Active Member of Clubs or Organizations: No     Attends Club or Organization Meetings: Patient declined     Marital Status:    Intimate Partner Violence: Not on file   Housing Stability: Low Risk  (10/29/2023)    Housing Stability Vital Sign     Unable to Pay for Housing in the Last Year: No     Number of Places Lived in the Last Year: 1     Unstable Housing in the Last Year: No     Allergies   Allergen Reactions    Amitriptyline      Sever headache and irritability    Aspirin      Other reaction(s): GI Intolerance    Banana Anaphylaxis    Eggs Diarrhea     Severe diarrhea    Flexeril [Cyclobenzaprine Hcl] Unspecified     \"Makes me crazy\"    Gluten Meal Diarrhea     Severe diarrhea    Nsaids Diarrhea     GI " "pain  Other reaction(s): GI Intolerance    Demerol Itching     Itching.  Can tolerate small doses.    Diazepam Anxiety    Gabapentin      \"makes me exhausted\"     Outpatient Encounter Medications as of 2/28/2025   Medication Sig Dispense Refill    pregabalin (LYRICA) 25 MG Cap       traMADol (ULTRAM) 50 MG Tab       Non Formulary Request Place  under the tongue 2 times a day. BIEST-estrogen only      Cholecalciferol (VITAMIN D PO) Take 10,000 Units by mouth every day. Pt takes liquid      Melatonin 1 MG Tab Take 1 Tab by mouth every bedtime.      MAGNESIUM PO Take 2 Tabs by mouth 2 Times a Day.      B Complex Vitamins (VITAMIN-B COMPLEX PO) Take 1 Tab by mouth every day.      Ascorbic Acid (VITAMIN C PO) Take 1 Tab by mouth every day.      metoprolol SR (TOPROL XL) 50 MG TABLET SR 24 HR Take 50 mg by mouth every morning.      progesterone (PROMETRIUM) 200 MG capsule Take 200 mg by mouth every evening.      thyroid (ARMOUR THYROID) 60 MG Tab Take 60 mg by mouth every morning.       No facility-administered encounter medications on file as of 2/28/2025.     ROS           Objective     /62 (BP Location: Left arm, Patient Position: Sitting, BP Cuff Size: Adult)   Pulse 71   Resp 16   Ht 1.626 m (5' 4\")   Wt 93.4 kg (206 lb)   LMP 12/03/2014   SpO2 96%   BMI 35.36 kg/m²     Physical Exam  Constitutional:       General: She is not in acute distress.     Appearance: She is not diaphoretic.   Eyes:      General: No scleral icterus.  Neck:      Vascular: No JVD.   Cardiovascular:      Rate and Rhythm: Normal rate.      Heart sounds: Normal heart sounds. No murmur heard.     No friction rub. No gallop.   Pulmonary:      Effort: No respiratory distress.      Breath sounds: No wheezing or rales.   Abdominal:      General: Bowel sounds are normal.      Palpations: Abdomen is soft.   Musculoskeletal:      Right lower leg: No edema.      Left lower leg: No edema.   Skin:     Findings: No rash.   Neurological:      " Mental Status: She is alert. Mental status is at baseline.   Psychiatric:         Mood and Affect: Mood normal.            We reviewed in person the most recent labs  We reviewed in person the recent labs  Recent Results (from the past 60 weeks)   EKG    Collection Time: 24  1:52 PM   Result Value Ref Range    Report       [facility]    Test Date:  2024  Pt Name:    BETTY ALLEN          Department: [facility]  MRN:        [id number]                      Room:  Gender:     Female                       Technician: [facility]  :        1960                   Requested By:KEHINDE ORTIZ  Order #:    [id number]                    Reading MD: Kehinde Ortiz MD    Measurements  Intervals                                Axis  Rate:       63                           P:          67  MT:         136                          QRS:        54  QRSD:       107                          T:          12  QT:         435  QTc:        446    Interpretive Statements  Sinus rhythm  Diffuse nonspecific ST-T wave abnormalities  Compared to ECG 2023 14:03:21  ST (T wave) deviation no longer present  Electronically Signed On 2024 17:30:13 PST by Kehinde Ortiz MD     CBC WITH DIFFERENTIAL    Collection Time: 24  8:01 AM   Result Value Ref Range    WBC 8.2 3.4 - 10.8 x10E3/uL    RBC 4.52 3.77 - 5.28 x10E6/uL    Hemoglobin 13.3 11.1 - 15.9 g/dL    Hematocrit 41.7 34.0 - 46.6 %    MCV 92 79 - 97 fL    MCH 29.4 26.6 - 33.0 pg    MCHC 31.9 31.5 - 35.7 g/dL    RDW 12.3 11.7 - 15.4 %    Platelet Count 242 150 - 450 x10E3/uL    Neutrophils-Polys 46 Not Estab. %    Lymphocytes 44 Not Estab. %    Monocytes 8 Not Estab. %    Eosinophils 1 Not Estab. %    Basophils 1 Not Estab. %    Immature Cells CANCELED     Neutrophils (Absolute) 3.8 1.4 - 7.0 x10E3/uL    Lymphs (Absolute) 3.6 (H) 0.7 - 3.1 x10E3/uL    Monos (Absolute) 0.6 0.1 - 0.9 x10E3/uL    Eos (Absolute) 0.1 0.0 - 0.4 x10E3/uL    Baso  (Absolute) 0.0 0.0 - 0.2 x10E3/uL    Immature Granulocytes 0 Not Estab. %    Immature Granulocytes (abs) 0.0 0.0 - 0.1 x10E3/uL    Nucleated RBC CANCELED     Comments-Diff CANCELED    COMP METABOLIC PANEL    Collection Time: 02/19/24  8:01 AM   Result Value Ref Range    Glucose 96 70 - 99 mg/dL    Bun 9 8 - 27 mg/dL    Creatinine 0.80 0.57 - 1.00 mg/dL    eGFR 83 >59 mL/min/1.73    Bun-Creatinine Ratio 11 (L) 12 - 28    Sodium 145 (H) 134 - 144 mmol/L    Potassium 4.6 3.5 - 5.2 mmol/L    Chloride 107 (H) 96 - 106 mmol/L    Co2 24 20 - 29 mmol/L    Calcium 9.1 8.7 - 10.3 mg/dL    Total Protein 6.3 6.0 - 8.5 g/dL    Albumin 4.0 3.9 - 4.9 g/dL    Globulin 2.3 1.5 - 4.5 g/dL    A-G Ratio 1.7 1.2 - 2.2    Total Bilirubin 0.7 0.0 - 1.2 mg/dL    Alkaline Phosphatase 63 44 - 121 IU/L    AST(SGOT) 20 0 - 40 IU/L    ALT(SGPT) 17 0 - 32 IU/L   LIPID PANEL    Collection Time: 02/19/24  8:01 AM   Result Value Ref Range    Cholesterol,Tot 181 100 - 199 mg/dL    Triglycerides 257 (H) 0 - 149 mg/dL    HDL 39 (L) >39 mg/dL    VLDL Cholesterol Calc 44 (H) 5 - 40 mg/dL    LDL Chol Calc (NIH) 98 0 - 99 mg/dL    Comment: CANCELED    RHEUMATOID ARTHRITIS PROFILE    Collection Time: 02/19/24  8:01 AM   Result Value Ref Range    Rheumatoid Factor -Neph- <10.0 <14.0 IU/mL    CCP Antibody IGG/IGA 3 0 - 19 units   Sed Rate    Collection Time: 02/19/24  8:01 AM   Result Value Ref Range    Sed Rate Westergren 14 0 - 40 mm/hr   CRP HIGH SENSITIVE    Collection Time: 02/19/24  8:01 AM   Result Value Ref Range    C Reactive Protein High Sensitive 12 (H) 0 - 10 mg/L   UA/M W/RFLX CULTURE, ROUTINE    Collection Time: 03/12/24 10:36 AM   Result Value Ref Range    Specific Gravity 1.010 1.005 - 1.030    Ph 6.0 5.0 - 7.5    Color Yellow Yellow    Character Clear Clear    Leukocyte Esterase Negative Negative    Protein Negative Negative/Trace    Glucose Negative Negative    Ketones Negative Negative    Occult Blood Negative Negative    Bilirubin  Negative Negative    Urobilinogen Semi Qnt 0.2 0.2 - 1.0 mg/dL    Nitrite Negative Negative    Microscopic Exam Comment     Microscopic Exam See below:     Urinalysis Reflex Comment    MICROSCOPIC EXAMINATION    Collection Time: 03/12/24 10:36 AM   Result Value Ref Range    WBC 0-5 0 - 5 /hpf    RBC None seen 0 - 2 /hpf    Epithelial Cells Non Renal 0-10 0 - 10 /hpf    Epithelial Cells Renal CANCELED     Urine Casts None seen None seen /lpf    Cast Type CANCELED     Urine Crystals CANCELED     Crystal Type CANCELED     Mucous Threads CANCELED     Bacteria Moderate (A) None seen/Few    Yeast Cells CANCELED     Trichomonas CANCELED     Comment CANCELED    URINE CULTURE, ROUTINE    Collection Time: 03/12/24 10:36 AM   Result Value Ref Range    Urine Culture Final report     Result 1 No growth    MICROALB/CREAT RATIO RAND. UR    Collection Time: 03/12/24 10:36 AM   Result Value Ref Range    Creatinine, Random Urine 48.6 Not Estab. mg/dL    Microalbumin, Urine Random <3.0 Not Estab. ug/mL    Albumin / Creatinine Ratio <6 0 - 29 mg/g creat   ANTINUCLEAR ANTIBODIES, IFA    Collection Time: 03/12/24 10:36 AM   Result Value Ref Range    Antinuclear Ab -Soumya- Ifa Negative      CT scan images revieweed minimal CAC      Assessment & Plan     1. Coronary artery disease due to lipid rich plaque        2. Primary hypertension        3. Dyslipidemia        4. Hypertriglyceridemia        5. Agatston CAC score, <100            Medical Decision Making: Today's Assessment/Status/Plan:      It was my pleasure to meet with Ms. Valle.    Blood pressure is well controlled.  She will continue to monitor and eat hearty healthy diet.    We addressed the management of hypertriglyceridemia at today's visit.       The 10-year ASCVD risk score (Reynaldo DK, et al., 2019) is: 5%     I will see Ms. Valle back in 2 year time and encouraged her to follow up with us over the phone or electronically using my MyChart as issues arise.    It is my  pleasure to participate in the care of Ms. Valle.  Please do not hesitate to contact me with questions or concerns.    Eliseo Wagner MD PhD [facility]  Cardiologist [facility]    Please note that this dictation was created using voice recognition software. There may be errors I did not discover before finalizing the note.

## 2025-03-19 ENCOUNTER — OFFICE VISIT (OUTPATIENT)
Dept: SLEEP MEDICINE | Facility: MEDICAL CENTER | Age: 65
End: 2025-03-19
Attending: PHYSICIAN ASSISTANT
Payer: COMMERCIAL

## 2025-03-19 VITALS
HEART RATE: 65 BPM | DIASTOLIC BLOOD PRESSURE: 64 MMHG | RESPIRATION RATE: 14 BRPM | HEIGHT: 64 IN | BODY MASS INDEX: 35.51 KG/M2 | SYSTOLIC BLOOD PRESSURE: 114 MMHG | OXYGEN SATURATION: 94 % | WEIGHT: 208 LBS

## 2025-03-19 DIAGNOSIS — G47.33 OBSTRUCTIVE SLEEP APNEA: ICD-10-CM

## 2025-03-19 PROCEDURE — 3074F SYST BP LT 130 MM HG: CPT | Performed by: PHYSICIAN ASSISTANT

## 2025-03-19 PROCEDURE — 3078F DIAST BP <80 MM HG: CPT | Performed by: PHYSICIAN ASSISTANT

## 2025-03-19 PROCEDURE — 99213 OFFICE O/P EST LOW 20 MIN: CPT | Performed by: PHYSICIAN ASSISTANT

## 2025-03-19 ASSESSMENT — ENCOUNTER SYMPTOMS
ROS GI COMMENTS: NO DENTURES, NO MISSING TEETH OR SWALLOWING ISSUES
COUGH: 0
HEADACHES: 0
INSOMNIA: 1
WEIGHT LOSS: 0
FEVER: 0
SORE THROAT: 0
SHORTNESS OF BREATH: 0
ORTHOPNEA: 0
DIZZINESS: 0
WHEEZING: 0
SINUS PAIN: 0
PALPITATIONS: 0
HEARTBURN: 0
CHILLS: 0
TREMORS: 0
SPUTUM PRODUCTION: 0

## 2025-03-19 ASSESSMENT — FIBROSIS 4 INDEX: FIB4 SCORE: 1.26

## 2025-03-19 NOTE — PATIENT INSTRUCTIONS
1-reviewed compliance  2-demonstrating use and benefit  3-obtains supplies on line  4-As a reminder use distilled water only in humidifier chamber.  Fresh fill daily  5-Today we reviewed equipment cleaning  once weekly minimum  mask, tubing and water chamber  use dedicated container  use mild soap and water  SoClean or other ozone  are not recommended  white vinegar and water solution is no longer recommended  hang tubing to dry  mask sanitizing wipes are an option for use   6-Equipment replacement schedule : Mask cushion every month, Head gear every 6 months, Tubing every 3 months, Ultra-fine filters 2 times per month, Humidifier chamber every 6 months  7-follow up in one year, sooner if needed

## 2025-03-19 NOTE — PROGRESS NOTES
"Chief Complaint   Patient presents with    Apnea     Last Office Visit 6/19/24 with Barb Cat P.A.-C.    Settings: APAP 5-12 CWP    Set up: 10/1/2021       HPI:  Senia Valle is a 64 y.o. year old female here today for follow-up on obstructive sleep apnea.  Last seen in clinic 6/19/2024 by NIESHA Graham.  Previously evaluated by Dr. Jaime Queen on 1/24/2024.    Past Medical History:  Cervical spinal degeneration, hypertension, fibromyalgia, acquired hypothyroidism, coronary artery disease, moderate depressive disorder, former smoker with 10-pack-year history and quit date in 1988.  Reports increased neck issues at this time.  Patient  is a physician.     Vitals:  /64 (BP Location: Left arm, Patient Position: Sitting, BP Cuff Size: Adult)   Pulse 65   Resp 14   Ht 1.626 m (5' 4\")   Wt 94.3 kg (208 lb)   SpO2 94% BMI of 35.7 kg/m².    Recent Imaging: None    Echocardiogram rest/stress at [facility] 8/30/2022 demonstrated normal left ventricular chamber size wall thickness, systolic and diastolic function.  LVEF estimated 60-65%.  False tendon is noted.  Normal right ventricular size and function, normal RVSP at 25 mmHg.  Mildly dilated left atrium, trace mitral and tricuspid regurgitation.    Currently using  Resmed auto CPAP @5-12 cm H20 pressure; compliance reviewed for 2/17/2025 through 3/18/2025, days used 30/30, average daily usage 8 hours 32 minutes, 100% of days greater than or equal to 4 hours, mask leak at 0.6 LPM at 95th percentile, AHI 0.6 per hour.  See media for full report.      Device obtained 10/1/2021  DME provider [facility]  Mask interface nasal cushion    Polysomnogram obtained 5/11/2016 demonstrating findings consistent with mild to moderate obstructive sleep apnea overall AHI of 14.4 increasing to 72 during REM sleep and 17.8 events per hour while supine.  Low oxygen saturation 79%.  Oxygen saturations were less than or equal to 89% 1.8 minutes of recorded " sleep time.     Overnight oximetry obtained 7/26/2016 demonstrated low oxygen saturation of 81% with sats less than 88% for 91 minutes.  Findings consistent with mild cyclical hypoxia, may reflect suboptimally treated sleep apnea/hypopnea.     Sleep schedule goes to bed 10:30 PM, wakens 7 30-8 a.m. , and gets up during the night 1-3 times   Symptoms denies day time somnolence and denies morning headache      Review of Systems   Constitutional:  Positive for malaise/fatigue (mild). Negative for chills, fever and weight loss.   HENT:  Negative for congestion, hearing loss, nosebleeds, sinus pain, sore throat and tinnitus.    Respiratory:  Negative for cough, sputum production, shortness of breath and wheezing.    Cardiovascular:  Positive for leg swelling (related to knee issues). Negative for chest pain, palpitations and orthopnea.   Gastrointestinal:  Negative for heartburn.        No dentures, no missing teeth or swallowing issues    Neurological:  Negative for dizziness, tremors and headaches.   Psychiatric/Behavioral:  The patient has insomnia (difficulty falling asleep).        Past Medical History:   Diagnosis Date    Anxiety disorder 05/2018    not an issue at this time. 6/6/23-RESOLVED was related to meds.    Arthritis Last week with my mri of my knee    Back pain     Bowel habit changes     diarrhea    Bruxism     Chest pain, atypical 08/26/2013    Chickenpox     COVID-19 2020    Degeneration of cervical intervertebral disc     Diarrhea     Dizziness     Dyslipidemia - low HDL     Fibromyalgia     Fibromyalgia muscle pain 08/26/2013    Hard to intubate     I have a cerical cage at c4 was told the had to use a child’s breathing tube    Heart burn     High cholesterol     Hypertension     Hypertensive cardiovascular disease 12/30/2011    Hypothyroidism     Lumbosacral (joint) (ligament) sprain     Lumbosacral (joint) (ligament) sprain     Obesity     Pain 05/2018    all over    Pain 06/06/2023    to right hip  "and bilateral knees, and radiates down right leg    Painful joint     Sleep apnea     \"border line apnea\" uses CPAP     SOB (shortness of breath) 08/26/2013    panic attacks. 6/6/23-none for years.    Sore muscles     Swelling of lower extremity     Tonsillitis     Wears glasses        Past Surgical History:   Procedure Laterality Date    PB KNEE SCOPE,MED OR LAT MENIS REPAIR Right 6/14/2023    Procedure: Right Knee arthroscopy, partial medial meniscectomy, chondroplasty;  Surgeon: Yong Cisneros M.D.;  Location: SURGERY [facility];  Service: Orthopedics    VENTRAL HERNIA REPAIR ROBOTIC XI  09/30/2019    Procedure: REPAIR, HERNIA, VENTRAL, ROBOT-ASSISTED, USING [employer] XI- EXTENDED VIEW EXTRAPERITONEAL INCISIONAL HERNIA WITH MESH PLACEMENT;  Surgeon: Jamie Randolph M.D.;  Location: SURGERY [facility];  Service: General    VENTRAL HERNIA REPAIR ROBOTIC XI  12/03/2018    Procedure: VENTRAL HERNIA REPAIR ROBOTIC XI - INCISIONAL W/POSS MESH PLACEMENT;  Surgeon: Jamie Randolph M.D.;  Location: SURGERY [facility];  Service: General    HIP ARTH ANTERIOR TOTAL Left 05/25/2018    Procedure: HIP ARTHROPLASTY ANTERIOR TOTAL;  Surgeon: Valdez Ndiaye M.D.;  Location: SURGERY [facility];  Service: Orthopedics    CERVICAL DISK AND FUSION ANTERIOR  cervical vertebral fusion    C4 cage to C5    CERVICAL DISK AND FUSION ANTERIOR      C1 to C7    CHOLECYSTECTOMY      COLONOSCOPY      ENDOMETRIAL ABLATION      ablation     HIP ARTHROSCOPY Right     HIP ARTHROSCOPY Left     LAMINOTOMY      diskectomy L4, L5, S1    LUMBAR LAMINECTOMY DISKECTOMY      unsuccessful    OTHER NEUROLOGICAL SURG      hardware removal to lumbar. Got infected    OTHER NEUROLOGICAL SURG      Nerve root L4-L5 cleaned out    TONSILLECTOMY         Family History   Problem Relation Age of Onset    Alcohol abuse Father     Lung Disease Mother         Copd    Cancer Maternal Grandmother 94        nonhodgkins lymphoma    Alzheimer's Disease Paternal " Grandfather     Asthma Brother         Grew out of it.       Social History     Socioeconomic History    Marital status:      Spouse name: Not on file    Number of children: Not on file    Years of education: Not on file    Highest education level: Bachelor's degree (e.g., BA, AB, BS)   Occupational History    Not on file   Tobacco Use    Smoking status: Former     Current packs/day: 0.00     Average packs/day: 1 pack/day for 10.0 years (10.0 ttl pk-yrs)     Types: Cigarettes     Start date: 1978     Quit date: 1988     Years since quittin.2    Smokeless tobacco: Never   Vaping Use    Vaping status: Never Used   Substance and Sexual Activity    Alcohol use: Not Currently     Alcohol/week: 1.2 oz     Types: 2 Glasses of wine per week    Drug use: No    Sexual activity: Not on file   Other Topics Concern    Not on file   Social History Narrative    Not on file     Social Drivers of Health     Financial Resource Strain: Low Risk  (10/29/2023)    Overall Financial Resource Strain (CARDIA)     Difficulty of Paying Living Expenses: Not hard at all   Food Insecurity: No Food Insecurity (10/29/2023)    Hunger Vital Sign     Worried About Running Out of Food in the Last Year: Never true     Ran Out of Food in the Last Year: Never true   Transportation Needs: No Transportation Needs (10/29/2023)    PRAPARE - Transportation     Lack of Transportation (Medical): No     Lack of Transportation (Non-Medical): No   Physical Activity: Inactive (10/29/2023)    Exercise Vital Sign     Days of Exercise per Week: 0 days     Minutes of Exercise per Session: 0 min   Stress: No Stress Concern Present (10/29/2023)    [employer] [place] [employer] - Occupational Stress Questionnaire     Feeling of Stress : Not at all   Social Connections: Moderately Integrated (10/29/2023)    Social Connection and Isolation Panel [NHANES]     Frequency of Communication with Friends and Family: More than three times a week      Frequency of Social Gatherings with Friends and Family: Once a week     Attends [employer] Services: 1 to 4 times per year     Active Member of Clubs or Organizations: No     Attends Club or Organization Meetings: Patient declined     Marital Status:    Intimate Partner Violence: Not on file   Housing Stability: Low Risk  (10/29/2023)    Housing Stability Vital Sign     Unable to Pay for Housing in the Last Year: No     Number of Places Lived in the Last Year: 1     Unstable Housing in the Last Year: No       Allergies as of 03/19/2025 - Reviewed 03/19/2025   Allergen Reaction Noted    Amitriptyline  06/06/2023    Aspirin  05/02/2022    Banana Anaphylaxis 05/14/2018    Eggs Diarrhea 08/30/2018    Flexeril [cyclobenzaprine hcl] Unspecified 12/29/2011    Gluten meal Diarrhea 08/30/2018    Nsaids Diarrhea 12/29/2011    Demerol Itching 05/05/2016    Diazepam Anxiety 05/05/2016    Gabapentin  05/25/2018          Current medications as of today   Current Outpatient Medications   Medication Sig Dispense Refill    pregabalin (LYRICA) 25 MG Cap       traMADol (ULTRAM) 50 MG Tab       Non Formulary Request Place  under the tongue 2 times a day. BIEST-estrogen only      Cholecalciferol (VITAMIN D PO) Take 10,000 Units by mouth every day. Pt takes liquid      Melatonin 1 MG Tab Take 1 Tab by mouth every bedtime.      MAGNESIUM PO Take 2 Tabs by mouth 2 Times a Day.      B Complex Vitamins (VITAMIN-B COMPLEX PO) Take 1 Tab by mouth every day.      Ascorbic Acid (VITAMIN C PO) Take 1 Tab by mouth every day.      metoprolol SR (TOPROL XL) 50 MG TABLET SR 24 HR Take 50 mg by mouth every morning.      progesterone (PROMETRIUM) 200 MG capsule Take 200 mg by mouth every evening.      thyroid (ARMOUR THYROID) 60 MG Tab Take 60 mg by mouth every morning.       No current facility-administered medications for this visit.         Physical Exam:   Gen:           Alert and oriented, No apparent distress. Mood and affect appropriate,  normal interaction with examiner.   Hearing:     Grossly intact.  Nose:          Normal, no lesions or deformities.  Dentition:    Good dentition.   Oropharynx:   Tongue normal, posterior pharynx without erythema or exudate.  Mallampati Classification: III  Neck:        Supple, trachea midline, no masses.  Respiratory Effort: No intercostal retractions or use of accessory muscles.   Gait and Station: Normal.  Digits and Nails: No clubbing, cyanosis, petechiae, or nodes.   Skin:        No rashes, lesions or ulcers noted.               Ext:           No cyanosis or edema.      Immunizations:  Flu: Recommended  PCV 20: Recommended  Moderna SARS CoV2 Vaccine: 3/4/2021, 2/4/2021  Pfizer SARS-CoV-2 vaccine: 11/29/2021    Assessment / Plan:  1. Obstructive sleep apnea    Reviewed compliance, demonstrating use and benefit.  Currently obtaining supplies online, resolution of excessive mask leak.  Reminded to use distilled water only in humidifier chamber with fresh fill daily.  Reviewed equipment cleaning and equipment replacement schedule.  Patient to follow-up in 1 year, sooner if needed.    2. Body mass index (BMI) 35.0-35.9, adult    Elevated BMI: Discussion regarding impact central adiposity on pulmonary function.  Encouraged to increase activity as tolerated and monitor nutritional intake.        Follow-up:   Return in about 1 year (around 3/19/2026) for Return with Barb Cat PA-C.    Please note that this dictation was created using voice recognition software. I have made every reasonable attempt to correct obvious errors, but it is possible there are errors of grammar and possibly content that I did not discover before finalizing the note.

## 2025-07-06 SDOH — HEALTH STABILITY: PHYSICAL HEALTH: ON AVERAGE, HOW MANY MINUTES DO YOU ENGAGE IN EXERCISE AT THIS LEVEL?: 30 MIN

## 2025-07-06 SDOH — ECONOMIC STABILITY: FOOD INSECURITY: WITHIN THE PAST 12 MONTHS, YOU WORRIED THAT YOUR FOOD WOULD RUN OUT BEFORE YOU GOT MONEY TO BUY MORE.: NEVER TRUE

## 2025-07-06 SDOH — ECONOMIC STABILITY: INCOME INSECURITY: IN THE LAST 12 MONTHS, WAS THERE A TIME WHEN YOU WERE NOT ABLE TO PAY THE MORTGAGE OR RENT ON TIME?: NO

## 2025-07-06 SDOH — ECONOMIC STABILITY: INCOME INSECURITY: HOW HARD IS IT FOR YOU TO PAY FOR THE VERY BASICS LIKE FOOD, HOUSING, MEDICAL CARE, AND HEATING?: NOT HARD AT ALL

## 2025-07-06 SDOH — HEALTH STABILITY: PHYSICAL HEALTH: ON AVERAGE, HOW MANY DAYS PER WEEK DO YOU ENGAGE IN MODERATE TO STRENUOUS EXERCISE (LIKE A BRISK WALK)?: 7 DAYS

## 2025-07-06 SDOH — ECONOMIC STABILITY: FOOD INSECURITY: WITHIN THE PAST 12 MONTHS, THE FOOD YOU BOUGHT JUST DIDN'T LAST AND YOU DIDN'T HAVE MONEY TO GET MORE.: NEVER TRUE

## 2025-07-06 SDOH — HEALTH STABILITY: MENTAL HEALTH
STRESS IS WHEN SOMEONE FEELS TENSE, NERVOUS, ANXIOUS, OR CAN'T SLEEP AT NIGHT BECAUSE THEIR MIND IS TROUBLED. HOW STRESSED ARE YOU?: RATHER MUCH

## 2025-07-06 ASSESSMENT — SOCIAL DETERMINANTS OF HEALTH (SDOH)
HOW OFTEN DO YOU GET TOGETHER WITH FRIENDS OR RELATIVES?: ONCE A WEEK
HOW OFTEN DO YOU ATTEND CHURCH OR RELIGIOUS SERVICES?: 1 TO 4 TIMES PER YEAR
WITHIN THE PAST 12 MONTHS, YOU WORRIED THAT YOUR FOOD WOULD RUN OUT BEFORE YOU GOT THE MONEY TO BUY MORE: NEVER TRUE
HOW OFTEN DO YOU HAVE SIX OR MORE DRINKS ON ONE OCCASION: NEVER
DO YOU BELONG TO ANY CLUBS OR ORGANIZATIONS SUCH AS CHURCH GROUPS UNIONS, FRATERNAL OR ATHLETIC GROUPS, OR SCHOOL GROUPS?: NO
HOW OFTEN DO YOU ATTENT MEETINGS OF THE CLUB OR ORGANIZATION YOU BELONG TO?: 1 TO 4 TIMES PER YEAR
HOW HARD IS IT FOR YOU TO PAY FOR THE VERY BASICS LIKE FOOD, HOUSING, MEDICAL CARE, AND HEATING?: NOT HARD AT ALL
HOW OFTEN DO YOU ATTEND CHURCH OR RELIGIOUS SERVICES?: 1 TO 4 TIMES PER YEAR
IN A TYPICAL WEEK, HOW MANY TIMES DO YOU TALK ON THE PHONE WITH FAMILY, FRIENDS, OR NEIGHBORS?: MORE THAN THREE TIMES A WEEK
HOW MANY DRINKS CONTAINING ALCOHOL DO YOU HAVE ON A TYPICAL DAY WHEN YOU ARE DRINKING: PATIENT DOES NOT DRINK
HOW OFTEN DO YOU HAVE A DRINK CONTAINING ALCOHOL: NEVER
HOW OFTEN DO YOU ATTENT MEETINGS OF THE CLUB OR ORGANIZATION YOU BELONG TO?: 1 TO 4 TIMES PER YEAR
IN A TYPICAL WEEK, HOW MANY TIMES DO YOU TALK ON THE PHONE WITH FAMILY, FRIENDS, OR NEIGHBORS?: MORE THAN THREE TIMES A WEEK
DO YOU BELONG TO ANY CLUBS OR ORGANIZATIONS SUCH AS CHURCH GROUPS UNIONS, FRATERNAL OR ATHLETIC GROUPS, OR SCHOOL GROUPS?: NO
IN THE PAST 12 MONTHS, HAS THE ELECTRIC, GAS, OIL, OR WATER COMPANY THREATENED TO SHUT OFF SERVICE IN YOUR HOME?: NO
HOW OFTEN DO YOU GET TOGETHER WITH FRIENDS OR RELATIVES?: ONCE A WEEK

## 2025-07-06 ASSESSMENT — LIFESTYLE VARIABLES
HOW MANY STANDARD DRINKS CONTAINING ALCOHOL DO YOU HAVE ON A TYPICAL DAY: PATIENT DOES NOT DRINK
AUDIT-C TOTAL SCORE: 0
SKIP TO QUESTIONS 9-10: 1
HOW OFTEN DO YOU HAVE SIX OR MORE DRINKS ON ONE OCCASION: NEVER
HOW OFTEN DO YOU HAVE A DRINK CONTAINING ALCOHOL: NEVER

## 2025-07-09 ENCOUNTER — APPOINTMENT (OUTPATIENT)
Dept: MEDICAL GROUP | Facility: IMAGING CENTER | Age: 65
End: 2025-07-09
Payer: COMMERCIAL

## 2025-07-09 VITALS
BODY MASS INDEX: 32.61 KG/M2 | SYSTOLIC BLOOD PRESSURE: 112 MMHG | RESPIRATION RATE: 16 BRPM | HEART RATE: 83 BPM | DIASTOLIC BLOOD PRESSURE: 60 MMHG | WEIGHT: 191 LBS | HEIGHT: 64 IN | OXYGEN SATURATION: 95 % | TEMPERATURE: 97.8 F

## 2025-07-09 DIAGNOSIS — E78.5 DYSLIPIDEMIA: Chronic | ICD-10-CM

## 2025-07-09 DIAGNOSIS — E04.2 MULTIPLE THYROID NODULES: ICD-10-CM

## 2025-07-09 DIAGNOSIS — Z79.899 CONTROLLED SUBSTANCE AGREEMENT SIGNED: ICD-10-CM

## 2025-07-09 DIAGNOSIS — I10 PRIMARY HYPERTENSION: Chronic | ICD-10-CM

## 2025-07-09 DIAGNOSIS — G47.33 OBSTRUCTIVE SLEEP APNEA: ICD-10-CM

## 2025-07-09 DIAGNOSIS — R73.03 PREDIABETES: ICD-10-CM

## 2025-07-09 DIAGNOSIS — Z12.83 ENCOUNTER FOR SCREENING FOR MALIGNANT NEOPLASM OF SKIN: Primary | ICD-10-CM

## 2025-07-09 DIAGNOSIS — M79.7 FIBROMYALGIA MUSCLE PAIN: ICD-10-CM

## 2025-07-09 DIAGNOSIS — R35.0 URINARY FREQUENCY: ICD-10-CM

## 2025-07-09 DIAGNOSIS — N95.1 POST MENOPAUSAL SYNDROME: ICD-10-CM

## 2025-07-09 DIAGNOSIS — R93.1 AGATSTON CAC SCORE, <100: Chronic | ICD-10-CM

## 2025-07-09 DIAGNOSIS — Z87.891 FORMER SMOKER: ICD-10-CM

## 2025-07-09 DIAGNOSIS — E03.9 ACQUIRED HYPOTHYROIDISM: ICD-10-CM

## 2025-07-09 PROBLEM — M25.50 ARTHRALGIA: Status: RESOLVED | Noted: 2024-02-13 | Resolved: 2025-07-09

## 2025-07-09 PROBLEM — R63.8 INCREASED BMI: Status: RESOLVED | Noted: 2021-08-02 | Resolved: 2025-07-09

## 2025-07-09 PROBLEM — Z00.00 HEALTH CARE MAINTENANCE: Status: RESOLVED | Noted: 2023-11-01 | Resolved: 2025-07-09

## 2025-07-09 PROBLEM — F33.1 MODERATE EPISODE OF RECURRENT MAJOR DEPRESSIVE DISORDER (HCC): Status: RESOLVED | Noted: 2024-02-13 | Resolved: 2025-07-09

## 2025-07-09 PROBLEM — R79.89 ELEVATED D-DIMER: Status: RESOLVED | Noted: 2018-08-30 | Resolved: 2025-07-09

## 2025-07-09 PROBLEM — R00.2 PALPITATIONS: Status: RESOLVED | Noted: 2020-05-05 | Resolved: 2025-07-09

## 2025-07-09 PROBLEM — D72.829 LEUKOCYTOSIS: Status: RESOLVED | Noted: 2018-05-27 | Resolved: 2025-07-09

## 2025-07-09 PROBLEM — M25.522 LEFT ELBOW PAIN: Status: RESOLVED | Noted: 2024-02-13 | Resolved: 2025-07-09

## 2025-07-09 PROCEDURE — 3078F DIAST BP <80 MM HG: CPT | Performed by: NURSE PRACTITIONER

## 2025-07-09 PROCEDURE — 99214 OFFICE O/P EST MOD 30 MIN: CPT | Performed by: NURSE PRACTITIONER

## 2025-07-09 PROCEDURE — 3074F SYST BP LT 130 MM HG: CPT | Performed by: NURSE PRACTITIONER

## 2025-07-09 RX ORDER — TRAMADOL HYDROCHLORIDE 50 MG/1
50 TABLET ORAL EVERY 8 HOURS PRN
Qty: 60 TABLET | Refills: 2 | Status: SHIPPED | OUTPATIENT
Start: 2025-07-09 | End: 2025-08-08

## 2025-07-09 ASSESSMENT — FIBROSIS 4 INDEX: FIB4 SCORE: 1.26

## 2025-07-09 NOTE — PROGRESS NOTES
Subjective:     History of Present Illness  The patient presents to [facility] care and for evaluation of fibromyalgia, thyroid nodules, hypertension, coronary artery disease, and prediabetes.    She has a history of fibromyalgia. She experiences frequent flare-ups, which she manages with regular exercise and walking. She reports pain in her toe, heel, and back, which disrupts her sleep. She also experiences severe leg pain that hinders her ability to walk. She is unable to take Tylenol due to diarrhea. She was previously on tramadol 50 mg as needed for pain but has been unable to obtain a prescription for the past 6 months. She occasionally takes half a Toradol injection, which causes mild stomach upset but helps with muscle pain. She administers these injections herself, typically once a week or month, but not more than once a week. She was previously on Lyrica for severe flare-ups but discontinued it after running out of the medication. She experienced mild dizziness for a day or two after stopping Lyrica but felt fine afterward. She is considering whether to resume Lyrica. Unsure if her day to day pain was improved with it or not.     She has thyroid nodules, which she has not had checked recently. She is curious if her body aches could be related to these nodules. She is currently on [place] Thyroid 60 mg in the morning.    She has lost weight with tirzepatide, dropping from 208 to 191 pounds, and believes her blood pressure is now lower. She is currently on metoprolol for hypertension.    She has coronary artery disease and has consulted with a cardiologist about a small plaque on her CT scan. The cardiologist advised that she does not need to return unless her condition changes. No statin at this time.     She has been on tirzepatide for about a month and feels it is helping her lose weight. She is considering increasing the dose to 7 mg, which will be more expensive. She is receiving the medication through  [facility].    She reports that she cannot consume alcohol anymore as it causes her body to ache severely the next morning, even with a small amount like half a glass of wine.    She has a history of back surgery, which has resulted in muscle atrophy in her gastroc and issues with her gait.    She has a history of degeneration of cervical disks, hypertension, lumbosacral ligament sprain in 2023, sleep apnea, hip replacement, and meniscus tears in both knees. One knee was fixed, but the other has arthritis and cannot be repaired due to missing bone. She has declined knee replacement surgery. She has left elbow pain and functional diarrhea since starting tirzepatide. Her gallbladder has been removed, and she has issues with eating eggs. She had a DEXA scan more than 5 years ago, which was normal. She completed a mammogram and colonoscopy last year, both of which were fine. She sees Dr. Longoria for her Pap smear.    PAST SURGICAL HISTORY:  - Back surgery  - Hip replacement  - Meniscus repair in one knee  - Gallbladder removal  - 11 other unspecified surgeries    FAMILY HISTORY  Her mother had osteoporosis and was put on Fosamax.      Current medicines (including changes today)  Current Medications[1]  She  has a past medical history of Anxiety disorder (05/2018), Arthritis (Last week with my mri of my knee), Back pain, Bowel habit changes, Bruxism, Chest pain, atypical (08/26/2013), Chickenpox, COVID-19 (2020), Degeneration of cervical intervertebral disc, Diarrhea, Dizziness, Dyslipidemia - low HDL, Fibromyalgia, Fibromyalgia muscle pain (08/26/2013), Fibromyalgia, primary, Hard to intubate, Heart burn, High cholesterol, Hypertension, Hypertensive cardiovascular disease (12/30/2011), Hypothyroidism, Lumbosacral (joint) (ligament) sprain, Lumbosacral (joint) (ligament) sprain, Obesity, Pain (05/2018), Pain (06/06/2023), Painful joint, Sleep apnea, SOB (shortness of breath) (08/26/2013), Sore  "muscles, Swelling of lower extremity, Tonsillitis, and Wears glasses.    She has no past medical history of Acute nasopharyngitis, Anesthesia, Anginal syndrome (HCC), Arrhythmia, Asthma, Blood clotting disorder (HCC), Bronchitis, Cancer (HCC), Carcinoma in situ of respiratory system, Cataract, Congestive heart failure (HCC), Continuous ambulatory peritoneal dialysis status (HCC), Coughing blood, Dental disorder, Dialysis patient (HCC), Emphysema of lung (HCC), Glaucoma, Gynecological disorder, Heart murmur, Heart valve disease, Hemorrhagic disorder (HCC), Hepatitis A, Hepatitis B, Hepatitis C, Hiatus hernia syndrome, Indigestion, Jaundice, Myocardial infarct (HCC), Pacemaker, Pneumonia, Pregnant, Renal disorder, Rheumatic fever, Seizure (HCC), Stroke (HCC), Tuberculosis, Urinary bladder disorder, or Urinary incontinence.    ROS   No chest pain, no shortness of breath, no abdominal pain  Positive ROS as per HPI.  All other systems reviewed and are negative.     Objective:     /60 (BP Location: Right arm, Patient Position: Sitting, BP Cuff Size: Adult)   Pulse 83   Temp 36.6 °C (97.8 °F) (Temporal)   Resp 16   Ht 1.626 m (5' 4\")   Wt 86.6 kg (191 lb)   SpO2 95%  Body mass index is 32.79 kg/m².   Physical Exam    Constitutional: Alert, no distress.  Skin: Warm, dry, good turgor, no rashes in visible areas.  Eye: Equal, round and reactive, conjunctiva clear, lids normal.  ENMT: Lips without lesions, good dentition  Respiratory: Unlabored respiratory effort  Psych: Alert and oriented x3, normal affect and mood.      Results  Labs   - A1c: 6.1 per patient        Assessment and Plan:   The following treatment plan was discussed    Assessment & Plan  1. Fibromyalgia.  - Reports ongoing pain and flare-ups associated with fibromyalgia.  - Physical exam findings include generalized pain and stiffness.  - Discussed the use of tramadol 50 mg as needed for pain relief; has not had a prescription for over six " months.  - Prescription for tramadol 50 mg will be sent to pharmacy; a controlled substance agreement contract will be provided for her signature, and an annual urine drug screen will be conducted. If severe pain affects daily activities, Lyrica can be restarted.    2. Thyroid nodules.  - History of thyroid nodules; currently on [place] Thyroid 60 mg in the morning.  - Physical exam findings include achiness and potential thyroid-related symptoms.  - An ultrasound will be ordered to assess the current status of the nodules.  - Once lab results are available, routine thyroid check will be scheduled.    3. Hypertension.  - Currently on metoprolol for hypertension.  - Reports significant weight loss, which may have affected blood pressure.  - Blood pressure will be monitored, and adjustments to medication will be made as necessary.  - Follow-up on blood pressure management and potential medication adjustment.    4. Coronary artery disease.  - Small amount of plaque buildup in arteries confirmed by CT scan.  - Consulted with a cardiologist; not currently on any medication for this condition.  - Repeat CT scan will be scheduled in 5 years to monitor any changes.  - Discussed the gray area of heart disease and hormone replacement therapy.    5. Prediabetes.  - A1c level was 6.1 during the last check.  - Discussed the importance of monitoring blood sugar levels.  - A1c level will be rechecked in 3 months.  - Encouraged dietary management and potential benefits of tirzepatide.    6. Health maintenance.  - Bone density test will be ordered due to thyroid issues, which increase the risk of bone loss.  - Discussed the importance of routine health maintenance and monitoring.  - Follow-up on bone density and other routine health checks.    Follow-up  - The patient will follow up in 3 months to review results and discuss any further management.      ORDERS:  1. Fibromyalgia muscle pain  - traMADol (ULTRAM) 50 MG Tab; Take 1 Tablet  by mouth every 8 hours as needed for Moderate Pain for up to 30 days.  Dispense: 60 Tablet; Refill: 2    2. Urinary frequency  - POCT Urinalysis  - URINE CULTURE(NEW); Future    3. Multiple thyroid nodules  - US-THYROID; Future    4. Prediabetes    5. Acquired hypothyroidism  - DS-BONE DENSITY STUDY (DEXA); Future    6. Post menopausal syndrome  - DS-BONE DENSITY STUDY (DEXA); Future    7. Former smoker  - DS-BONE DENSITY STUDY (DEXA); Future    8. Primary hypertension    9. Obstructive sleep apnea    10. Dyslipidemia - low HDL    11. Agatston CAC score, 4.5, 50%tile, 2024    12. Encounter for screening for malignant neoplasm of skin (Primary)  - Referral to Dermatology    13. Controlled substance agreement signed  - Controlled Substance Treatment Agreement  - DRUG PROFILE, UR, 9 DRUGS          The MA is performing the above orders under the direction of Dr. Rodriguez or Dr. Sarabia    Please note that this dictation was created using voice recognition software. I have made every reasonable attempt to correct obvious errors, but I expect that there are errors of grammar and possibly content that I did not discover before finalizing the note.      Attestation      Verbal consent was acquired by the patient to use [employer] ambient listening note generation during this visit Yes                  [1]   Current Outpatient Medications   Medication Sig Dispense Refill    traMADol (ULTRAM) 50 MG Tab Take 1 Tablet by mouth every 8 hours as needed for Moderate Pain for up to 30 days. 60 Tablet 2    Non Formulary Request Place  under the tongue 2 times a day. BIEST-estrogen only      Cholecalciferol (VITAMIN D PO) Take 10,000 Units by mouth every day. Pt takes liquid      Melatonin 1 MG Tab Take 1 Tab by mouth every bedtime.      MAGNESIUM PO Take 2 Tabs by mouth 2 Times a Day.      B Complex Vitamins (VITAMIN-B COMPLEX PO) Take 1 Tab by mouth every day.      Ascorbic Acid (VITAMIN C PO) Take 1 Tab by mouth every day.       metoprolol SR (TOPROL XL) 50 MG TABLET SR 24 HR Take 50 mg by mouth every morning.      progesterone (PROMETRIUM) 200 MG capsule Take 200 mg by mouth every evening.      thyroid (ARMOUR THYROID) 60 MG Tab Take 60 mg by mouth every morning.       No current facility-administered medications for this visit.

## 2025-07-14 ENCOUNTER — HOSPITAL ENCOUNTER (OUTPATIENT)
Dept: LAB | Facility: MEDICAL CENTER | Age: 65
End: 2025-07-14
Attending: NURSE PRACTITIONER
Payer: COMMERCIAL

## 2025-07-14 DIAGNOSIS — R35.0 URINARY FREQUENCY: ICD-10-CM

## 2025-07-14 LAB
APPEARANCE UR: CLEAR
BILIRUB UR QL STRIP.AUTO: NEGATIVE
COLOR UR: ABNORMAL
GLUCOSE UR STRIP.AUTO-MCNC: NEGATIVE MG/DL
KETONES UR STRIP.AUTO-MCNC: ABNORMAL MG/DL
LEUKOCYTE ESTERASE UR QL STRIP.AUTO: NEGATIVE
MICRO URNS: ABNORMAL
NITRITE UR QL STRIP.AUTO: NEGATIVE
PH UR STRIP.AUTO: 5.5 [PH] (ref 5–8)
PROT UR QL STRIP: NEGATIVE MG/DL
RBC UR QL AUTO: NEGATIVE
SP GR UR STRIP.AUTO: 1.02
UROBILINOGEN UR STRIP.AUTO-MCNC: 0.2 EU/DL

## 2025-07-14 PROCEDURE — 87086 URINE CULTURE/COLONY COUNT: CPT

## 2025-07-14 PROCEDURE — 81003 URINALYSIS AUTO W/O SCOPE: CPT

## 2025-07-16 LAB
BACTERIA UR CULT: NORMAL
SIGNIFICANT IND 70042: NORMAL
SITE SITE: NORMAL
SOURCE SOURCE: NORMAL

## 2025-07-24 ENCOUNTER — APPOINTMENT (OUTPATIENT)
Dept: RADIOLOGY | Facility: MEDICAL CENTER | Age: 65
End: 2025-07-24
Attending: NURSE PRACTITIONER
Payer: COMMERCIAL

## 2025-08-18 ENCOUNTER — HOSPITAL ENCOUNTER (OUTPATIENT)
Dept: RADIOLOGY | Facility: MEDICAL CENTER | Age: 65
End: 2025-08-18
Attending: NURSE PRACTITIONER
Payer: COMMERCIAL

## 2025-08-18 DIAGNOSIS — E04.2 MULTIPLE THYROID NODULES: ICD-10-CM

## 2025-08-18 PROCEDURE — 76536 US EXAM OF HEAD AND NECK: CPT

## 2025-08-19 ENCOUNTER — HOSPITAL ENCOUNTER (OUTPATIENT)
Dept: LAB | Facility: MEDICAL CENTER | Age: 65
End: 2025-08-19
Attending: OBSTETRICS & GYNECOLOGY
Payer: COMMERCIAL

## 2025-08-19 ENCOUNTER — HOSPITAL ENCOUNTER (OUTPATIENT)
Facility: MEDICAL CENTER | Age: 65
End: 2025-08-19
Attending: OBSTETRICS & GYNECOLOGY
Payer: COMMERCIAL

## 2025-08-19 LAB
ESTRADIOL SERPL-MCNC: 21.7 PG/ML
PATHOLOGY CONSULT NOTE: NORMAL

## 2025-08-19 PROCEDURE — 82670 ASSAY OF TOTAL ESTRADIOL: CPT

## 2025-08-19 PROCEDURE — 36415 COLL VENOUS BLD VENIPUNCTURE: CPT

## 2025-08-19 PROCEDURE — 88305 TISSUE EXAM BY PATHOLOGIST: CPT | Mod: 26 | Performed by: PATHOLOGY

## 2025-08-19 PROCEDURE — 88305 TISSUE EXAM BY PATHOLOGIST: CPT | Performed by: PATHOLOGY

## (undated) DEVICE — GVL 3 STAT DISPOSABLE - (10/BX)

## (undated) DEVICE — SODIUM CHL. IRRIGATION 0.9% 3000ML (4EA/CA 65CA/PF)

## (undated) DEVICE — REDUCER XI STAPLER 12MM TO 8MM (6EA/BX)

## (undated) DEVICE — SUTURE 2-0 30CM STRATAFIX SPIRAL PDO ***WAS PART #SXPD1B401 *****

## (undated) DEVICE — ELECTRODE 850 FOAM ADHESIVE - HYDROGEL RADIOTRNSPRNT (50/PK)

## (undated) DEVICE — CHLORAPREP 26 ML APPLICATOR - ORANGE TINT(25/CA)

## (undated) DEVICE — SODIUM CHL IRRIGATION 0.9% 1000ML (12EA/CA)

## (undated) DEVICE — SENSOR SPO2 NEO LNCS ADHESIVE (20/BX) SEE USER NOTES

## (undated) DEVICE — GLOVE BIOGEL INDICATOR SZ 6.5 SURGICAL PF LTX - (50PR/BX 4BX/CA)

## (undated) DEVICE — SLEEVE, VASO, THIGH, MED

## (undated) DEVICE — NEEDLE SAFETY 18 GA X 1 1/2 IN (100EA/BX)

## (undated) DEVICE — HEAD HOLDER JUNIOR/ADULT

## (undated) DEVICE — PEN SKIN MARKER W/RULER - (50EA/BX)

## (undated) DEVICE — SHEARS MONOPOLAR CURVED  DA VINCI 10X'S REUSABLE

## (undated) DEVICE — OBTURATOR BLADELESS STANDARD 8MM (6EA/BX)

## (undated) DEVICE — DRAPE C-ARM LARGE 41IN X 74 IN - (10/BX 2BX/CA)

## (undated) DEVICE — BLADE SHAVER AGGRESSIVE PLUS 4.0MM ANGLED (5EA/BX)

## (undated) DEVICE — NEEDLE INSFL 120MM 14GA VRRS - (20/BX)

## (undated) DEVICE — GLOVE BIOGEL PI ORTHO SZ 7 PF LF (40PR/BX)

## (undated) DEVICE — CANISTER SUCTION 3000ML MECHANICAL FILTER AUTO SHUTOFF MEDI-VAC NONSTERILE LF DISP  (40EA/CA)

## (undated) DEVICE — SUTURE2-0 20CM STRATAFIX SPIRAL PDS CT-1 (12EA/BX)

## (undated) DEVICE — GLOVE SZ 6.5 BIOGEL PI MICRO - PF LF (50PR/BX)

## (undated) DEVICE — SUTURE 2-0 MONOCRYL PLUS UNDYED CT-1 1 X 36 (36EA/BX)"

## (undated) DEVICE — NEEDLE DRIVER MEGA SUTURECUT DA VINCI 15X'S REUSABLE

## (undated) DEVICE — Device

## (undated) DEVICE — GLOVE BIOGEL INDICATOR SZ 8 SURGICAL PF LTX - (50/BX 4BX/CA)

## (undated) DEVICE — DERMABOND ADVANCED - (12EA/BX)

## (undated) DEVICE — DRAPE LARGE 3 QUARTER - (20/CA)

## (undated) DEVICE — ROBOTIC SURGERY SERVICES

## (undated) DEVICE — GLOVE BIOGEL SZ 8 SURGICAL PF LTX - (50PR/BX 4BX/CA)

## (undated) DEVICE — GLOVE BIOGEL PI ORTHO SZ 8 PF LF (40PR/BX)

## (undated) DEVICE — PACK ARTHROSCOPY - (2EA//CA)

## (undated) DEVICE — SUCTION INSTRUMENT YANKAUER BULBOUS TIP W/O VENT (50EA/CA)

## (undated) DEVICE — TIP INTPLS HFLO ML ORFC BTRY - (12/CS)  FOR SURGILAV

## (undated) DEVICE — COVER TIP ENDOWRIST HOT SHEAR - (10EA/BX) DA VINCI

## (undated) DEVICE — GOWN WARMING STANDARD FLEX - (30/CA)

## (undated) DEVICE — SUTURE 3-0 PROLENE PS-1 (12PK/BX)

## (undated) DEVICE — MASK ANESTHESIA ADULT  - (100/CA)

## (undated) DEVICE — KIT ANESTHESIA W/CIRCUIT & 3/LT BAG W/FILTER (20EA/CA)

## (undated) DEVICE — GLOVE BIOGEL INDICATOR SZ 7.5 SURGICAL PF LTX - (50PR/BX 4BX/CA)

## (undated) DEVICE — GLOVE BIOGEL INDICATOR SZ 8.5 SURGICAL PF LTX - (50/BX 4BX/CA)

## (undated) DEVICE — TUBING CLEARLINK DUO-VENT - C-FLO (48EA/CA)

## (undated) DEVICE — SUTURE 0 SILK TIES (36PK/BX)

## (undated) DEVICE — TUBE E-T HI-LO CUFF 6.5MM (10EA/BX)

## (undated) DEVICE — SUTURE 2-0 VICRYL PLUS CT-2 - 27 INCH (36/BX)

## (undated) DEVICE — GLOVE BIOGEL SZ 8.5 SURGICAL PF LTX - (50PR/BX 4BX/CA)

## (undated) DEVICE — GLOVE BIOGEL PI INDICATOR SZ 7.0 SURGICAL PF LF - (50/BX 4BX/CA)

## (undated) DEVICE — ABLATOR WAND SERFAS 90-S CRUISE

## (undated) DEVICE — GOWN SURGEONS X-LARGE - DISP. (30/CA)

## (undated) DEVICE — DRAPE SRG LG BCK TBL DISP - 10/CA

## (undated) DEVICE — ELECTRODE DUAL RETURN W/ CORD - (50/PK)

## (undated) DEVICE — TROCAR 5X100 NON BLADED Z-TH - READ KII (6/BX)

## (undated) DEVICE — TOWEL STOP TIMEOUT SAFETY FLAG (40EA/CA)

## (undated) DEVICE — SYSTEM CLEARIFY VISUALIZATION (10EA/PK)

## (undated) DEVICE — DRAPE ARM  BOX OF 20

## (undated) DEVICE — SET EXTENSION WITH 2 PORTS (48EA/CA) ***PART #2C8610 IS A SUBSTITUTE*****

## (undated) DEVICE — KIT ROOM DECONTAMINATION

## (undated) DEVICE — TOWELS CLOTH SURGICAL - (4/PK 20PK/CA)

## (undated) DEVICE — SET LEADWIRE 5 LEAD BEDSIDE DISPOSABLE ECG (1SET OF 5/EA)

## (undated) DEVICE — TUBE E-T HI-LO CUFF 7.0MM (10EA/PK)

## (undated) DEVICE — TUBING DAY USE W/CARTRIDGE (10EA/BX)

## (undated) DEVICE — DRAPE COLUMN  BOX OF 20

## (undated) DEVICE — DRESSING TRANSPARENT FILM TEGADERM 4 X 4.75" (50EA/BX)"

## (undated) DEVICE — SEAL CANNULA STAPLER 12 MM (10EA/BX)

## (undated) DEVICE — SUTURE GENERAL

## (undated) DEVICE — PROTECTOR ULNA NERVE - (36PR/CA)

## (undated) DEVICE — TUBING CASSETTE CROSSFLOW INTEGRATED (10EA/CA)

## (undated) DEVICE — SUTURE 4-0 MONOCRYL PLUS PS-2 - 27 INCH (36/BX)

## (undated) DEVICE — COVER LIGHT HANDLE FLEXIBLE - SOFT (2EA/PK 80PK/CA)

## (undated) DEVICE — DRAPE LOWER EXTREMETY - (6/CA)

## (undated) DEVICE — HANDPIECE 10FT INTPLS SCT PLS IRRIGATION HAND CONTROL SET (6/PK)

## (undated) DEVICE — BINDER ABDOMINAL 30X45X12IN - FITS 30-45IN WAIST 12IN HIGH

## (undated) DEVICE — LACTATED RINGERS INJ 1000 ML - (14EA/CA 60CA/PF)

## (undated) DEVICE — SPONGE GAUZESTER 4 X 4 4PLY - (128PK/CA)

## (undated) DEVICE — SYRINGE 30 ML LS (56/BX)

## (undated) DEVICE — SENSOR OXIMETER ADULT SPO2 RD SET (20EA/BX)

## (undated) DEVICE — LENS/HOOD FOR SPACESUIT - (32/PK) PEEL AWAY FACE

## (undated) DEVICE — SEAL 5MM-8MM UNIVERSAL  BOX OF 10

## (undated) DEVICE — BLADE 90X18X1.27MM SAW SAGITTAL

## (undated) DEVICE — SUTURE 2-0 STRATAFIX SPIRAL PDS SH (12EA/BX)

## (undated) DEVICE — SUTURE 1 PDS-2 PLUS CTX - (24/BX)

## (undated) DEVICE — SUTURE 3-0 MONOCRYL PLUS PS-1 - 27 INCH (36/BX)

## (undated) DEVICE — PACK TOTAL HIP - (1/CA)

## (undated) DEVICE — GLOVE BIOGEL PI ORTHO SZ 7.5 PF LF (40PR/BX)

## (undated) DEVICE — FORCEPS PROGRASP DA VINCI 10X'S REUSABLE

## (undated) DEVICE — SOLUTION 10%PVP-IODINE 8OZ - (24/CA)

## (undated) DEVICE — SYRINGE DISP. 60 CC LL - (30/BX, 12BX/CA)**WHEN THESE ARE GONE ORDER #500206**

## (undated) DEVICE — DRAPE SURGICAL U 77X120 - (10/CA)

## (undated) DEVICE — GOWN SURGICAL X-LARGE ULTRA - FILM-REINFORCED (20/CA)

## (undated) DEVICE — SUTURE 1 VICRYL PLUS CTX - 8 X 18 INCH (12/BX)

## (undated) DEVICE — GLOVE BIOGEL SZ 6.5 SURGICAL PF LTX (50PR/BX 4BX/CA)

## (undated) DEVICE — WATER IRRIGATION STERILE 1000ML (12EA/CA)

## (undated) DEVICE — NEPTUNE 4 PORT MANIFOLD - (20/PK)

## (undated) DEVICE — SET SUCTION/IRRIGATION WITH DISPOSABLE TIP (6/CA )PART #0250-070-520 IS A SUB